# Patient Record
Sex: FEMALE | Race: WHITE | HISPANIC OR LATINO | Employment: OTHER | URBAN - METROPOLITAN AREA
[De-identification: names, ages, dates, MRNs, and addresses within clinical notes are randomized per-mention and may not be internally consistent; named-entity substitution may affect disease eponyms.]

---

## 2017-02-15 ENCOUNTER — HOSPITAL ENCOUNTER (OUTPATIENT)
Dept: RADIOLOGY | Age: 72
Discharge: HOME | End: 2017-02-15
Payer: COMMERCIAL

## 2017-02-15 DIAGNOSIS — M54.50 LOW BACK PAIN: ICD-10-CM

## 2017-02-15 PROCEDURE — 77080 DXA BONE DENSITY AXIAL: CPT

## 2017-02-16 ENCOUNTER — ALLSCRIPTS OFFICE VISIT (OUTPATIENT)
Dept: OTHER | Facility: OTHER | Age: 72
End: 2017-02-16

## 2017-04-14 ENCOUNTER — ALLSCRIPTS OFFICE VISIT (OUTPATIENT)
Dept: OTHER | Facility: OTHER | Age: 72
End: 2017-04-14

## 2017-04-26 ENCOUNTER — ALLSCRIPTS OFFICE VISIT (OUTPATIENT)
Dept: OTHER | Facility: OTHER | Age: 72
End: 2017-04-26

## 2017-05-26 ENCOUNTER — GENERIC CONVERSION - ENCOUNTER (OUTPATIENT)
Dept: OTHER | Facility: OTHER | Age: 72
End: 2017-05-26

## 2017-06-28 ENCOUNTER — ALLSCRIPTS OFFICE VISIT (OUTPATIENT)
Dept: OTHER | Facility: OTHER | Age: 72
End: 2017-06-28

## 2017-07-21 ENCOUNTER — ALLSCRIPTS OFFICE VISIT (OUTPATIENT)
Dept: OTHER | Facility: OTHER | Age: 72
End: 2017-07-21

## 2017-07-27 ENCOUNTER — GENERIC CONVERSION - ENCOUNTER (OUTPATIENT)
Dept: OTHER | Facility: OTHER | Age: 72
End: 2017-07-27

## 2017-08-10 ENCOUNTER — ALLSCRIPTS OFFICE VISIT (OUTPATIENT)
Dept: OTHER | Facility: OTHER | Age: 72
End: 2017-08-10

## 2017-08-10 DIAGNOSIS — E78.00 PURE HYPERCHOLESTEROLEMIA: ICD-10-CM

## 2017-08-10 DIAGNOSIS — R42 DIZZINESS AND GIDDINESS: ICD-10-CM

## 2017-08-15 ENCOUNTER — TRANSCRIBE ORDERS (OUTPATIENT)
Dept: ADMINISTRATIVE | Facility: HOSPITAL | Age: 72
End: 2017-08-15

## 2017-08-15 DIAGNOSIS — I35.9 AORTIC VALVE CALCIFICATION: Primary | ICD-10-CM

## 2017-08-22 ENCOUNTER — HOSPITAL ENCOUNTER (OUTPATIENT)
Dept: NON INVASIVE DIAGNOSTICS | Facility: CLINIC | Age: 72
Discharge: HOME | End: 2017-08-22
Payer: COMMERCIAL

## 2017-08-22 DIAGNOSIS — I35.9 AORTIC VALVE CALCIFICATION: ICD-10-CM

## 2017-08-22 PROCEDURE — 93306 TTE W/DOPPLER COMPLETE: CPT

## 2017-10-11 ENCOUNTER — APPOINTMENT (OUTPATIENT)
Dept: PHYSICAL THERAPY | Facility: CLINIC | Age: 72
End: 2017-10-11
Payer: COMMERCIAL

## 2017-10-11 DIAGNOSIS — R42 DIZZINESS AND GIDDINESS: ICD-10-CM

## 2017-10-11 DIAGNOSIS — E78.00 PURE HYPERCHOLESTEROLEMIA: ICD-10-CM

## 2017-10-11 PROCEDURE — G8979 MOBILITY GOAL STATUS: HCPCS

## 2017-10-11 PROCEDURE — 97162 PT EVAL MOD COMPLEX 30 MIN: CPT

## 2017-10-11 PROCEDURE — G8978 MOBILITY CURRENT STATUS: HCPCS

## 2017-10-12 ENCOUNTER — GENERIC CONVERSION - ENCOUNTER (OUTPATIENT)
Dept: OTHER | Facility: OTHER | Age: 72
End: 2017-10-12

## 2017-10-18 ENCOUNTER — APPOINTMENT (OUTPATIENT)
Dept: PHYSICAL THERAPY | Facility: CLINIC | Age: 72
End: 2017-10-18
Payer: COMMERCIAL

## 2017-10-25 ENCOUNTER — APPOINTMENT (OUTPATIENT)
Dept: PHYSICAL THERAPY | Facility: CLINIC | Age: 72
End: 2017-10-25
Payer: COMMERCIAL

## 2017-10-25 PROCEDURE — 97112 NEUROMUSCULAR REEDUCATION: CPT

## 2018-01-11 NOTE — RESULT NOTES
Verified Results  (1) LIPID PANEL FASTING W DIRECT LDL REFLEX [id number] 07:29AM Guerita Galvez     Test Name Result Flag Reference   CHOLESTEROL 176 mg/dL     LDL CHOLESTEROL CALCULATED 87 mg/dL  0-100   Triglyceride:         Normal              <150 mg/dl       Borderline High    150-199 mg/dl       High               200-499 mg/dl       Very High          >499 mg/dl  Cholesterol:         Desirable        <200 mg/dl      Borderline High  200-239 mg/dl      High             >239 mg/dl  HDL Cholesterol:        High    >59 mg/dL      Low     <41 mg/dL  LDL Cholesterol:        Optimal          <100 mg/dl        Near Optimal     100-129 mg/dl        Above Optimal          Borderline High   130-159 mg/dl          High              160-189 mg/dl          Very High        >189 mg/dl  LDL CALCULATED:    This screening LDL is a calculated result  It does not have the accuracy of the Direct Measured LDL in the monitoring of patients with hyperlipidemia and/or statin therapy  Direct Measure LDL (JFL586) must be ordered separately in these patients  TRIGLYCERIDES 175 mg/dL H <=150   Specimen collection should occur prior to N-Acetylcysteine or Metamizole administration due to the potential for falsely depressed results  HDL,DIRECT 54 mg/dL  40-60   Specimen collection should occur prior to Metamizole administration due to the potential for falsely depressed results

## 2018-01-12 VITALS
SYSTOLIC BLOOD PRESSURE: 130 MMHG | HEIGHT: 62 IN | RESPIRATION RATE: 16 BRPM | HEART RATE: 78 BPM | BODY MASS INDEX: 31.28 KG/M2 | DIASTOLIC BLOOD PRESSURE: 70 MMHG | TEMPERATURE: 98 F | WEIGHT: 170 LBS

## 2018-01-13 VITALS
DIASTOLIC BLOOD PRESSURE: 80 MMHG | BODY MASS INDEX: 31.83 KG/M2 | WEIGHT: 173 LBS | HEIGHT: 62 IN | RESPIRATION RATE: 18 BRPM | TEMPERATURE: 98.3 F | SYSTOLIC BLOOD PRESSURE: 140 MMHG | HEART RATE: 78 BPM

## 2018-01-13 VITALS
SYSTOLIC BLOOD PRESSURE: 122 MMHG | WEIGHT: 169.25 LBS | BODY MASS INDEX: 31.15 KG/M2 | HEIGHT: 62 IN | HEART RATE: 62 BPM | DIASTOLIC BLOOD PRESSURE: 80 MMHG

## 2018-01-14 VITALS
SYSTOLIC BLOOD PRESSURE: 130 MMHG | DIASTOLIC BLOOD PRESSURE: 80 MMHG | HEART RATE: 76 BPM | TEMPERATURE: 97.9 F | BODY MASS INDEX: 31.1 KG/M2 | WEIGHT: 169 LBS | RESPIRATION RATE: 18 BRPM | HEIGHT: 62 IN

## 2018-01-16 NOTE — MISCELLANEOUS
Provider Comments  Provider Comments:   pt no showed today      Signatures   Electronically signed by : Ru Coley, ; May 26 2017  2:16PM EST                       (Author)

## 2018-01-30 DIAGNOSIS — K21.9 GERD (GASTROESOPHAGEAL REFLUX DISEASE): Primary | ICD-10-CM

## 2018-01-30 RX ORDER — OMEPRAZOLE 20 MG/1
CAPSULE, DELAYED RELEASE ORAL
Qty: 90 CAPSULE | Refills: 1 | Status: SHIPPED | OUTPATIENT
Start: 2018-01-30 | End: 2018-02-03 | Stop reason: SDUPTHER

## 2018-02-02 DIAGNOSIS — K21.9 GERD (GASTROESOPHAGEAL REFLUX DISEASE): ICD-10-CM

## 2018-02-02 RX ORDER — OMEPRAZOLE 20 MG/1
CAPSULE, DELAYED RELEASE ORAL
Qty: 90 CAPSULE | Refills: 1 | OUTPATIENT
Start: 2018-02-02

## 2018-02-03 DIAGNOSIS — K21.9 GASTROESOPHAGEAL REFLUX DISEASE WITHOUT ESOPHAGITIS: ICD-10-CM

## 2018-02-03 RX ORDER — OMEPRAZOLE 20 MG/1
20 CAPSULE, DELAYED RELEASE ORAL DAILY
Qty: 90 CAPSULE | Refills: 2 | Status: SHIPPED | OUTPATIENT
Start: 2018-02-03 | End: 2018-11-05 | Stop reason: SDUPTHER

## 2018-03-27 ENCOUNTER — TRANSCRIBE ORDERS (OUTPATIENT)
Dept: INTERNAL MEDICINE CLINIC | Facility: CLINIC | Age: 73
End: 2018-03-27

## 2018-03-27 DIAGNOSIS — R21 RASH AND OTHER NONSPECIFIC SKIN ERUPTION: Primary | ICD-10-CM

## 2018-03-28 ENCOUNTER — OFFICE VISIT (OUTPATIENT)
Dept: MULTI SPECIALTY CLINIC | Facility: CLINIC | Age: 73
End: 2018-03-28
Payer: COMMERCIAL

## 2018-03-28 DIAGNOSIS — R21 RASH AND OTHER NONSPECIFIC SKIN ERUPTION: ICD-10-CM

## 2018-03-28 PROCEDURE — 99212 OFFICE O/P EST SF 10 MIN: CPT | Performed by: SPECIALIST

## 2018-04-17 ENCOUNTER — APPOINTMENT (OUTPATIENT)
Dept: LAB | Facility: HOSPITAL | Age: 73
End: 2018-04-17
Payer: COMMERCIAL

## 2018-04-17 ENCOUNTER — TRANSCRIBE ORDERS (OUTPATIENT)
Dept: LAB | Facility: HOSPITAL | Age: 73
End: 2018-04-17

## 2018-04-17 DIAGNOSIS — F33.3 SEVERE RECURRENT MAJOR DEPRESSION WITH PSYCHOTIC FEATURES (HCC): Primary | ICD-10-CM

## 2018-04-17 DIAGNOSIS — Z79.899 LONG-TERM USE OF HIGH-RISK MEDICATION: ICD-10-CM

## 2018-04-17 DIAGNOSIS — F33.3 SEVERE RECURRENT MAJOR DEPRESSION WITH PSYCHOTIC FEATURES (HCC): ICD-10-CM

## 2018-04-17 LAB
25(OH)D3 SERPL-MCNC: 25.7 NG/ML (ref 30–100)
ALBUMIN SERPL BCP-MCNC: 4.1 G/DL (ref 3.5–5)
ALP SERPL-CCNC: 95 U/L (ref 46–116)
ALT SERPL W P-5'-P-CCNC: 15 U/L (ref 12–78)
ANION GAP SERPL CALCULATED.3IONS-SCNC: 6 MMOL/L (ref 4–13)
AST SERPL W P-5'-P-CCNC: 18 U/L (ref 5–45)
BASOPHILS # BLD AUTO: 0.01 THOUSANDS/ΜL (ref 0–0.1)
BASOPHILS NFR BLD AUTO: 0 % (ref 0–1)
BILIRUB SERPL-MCNC: 0.49 MG/DL (ref 0.2–1)
BUN SERPL-MCNC: 11 MG/DL (ref 5–25)
CALCIUM SERPL-MCNC: 9.3 MG/DL (ref 8.3–10.1)
CHLORIDE SERPL-SCNC: 103 MMOL/L (ref 100–108)
CHOLEST SERPL-MCNC: 157 MG/DL (ref 50–200)
CO2 SERPL-SCNC: 29 MMOL/L (ref 21–32)
CREAT SERPL-MCNC: 0.89 MG/DL (ref 0.6–1.3)
EOSINOPHIL # BLD AUTO: 0.23 THOUSAND/ΜL (ref 0–0.61)
EOSINOPHIL NFR BLD AUTO: 2 % (ref 0–6)
ERYTHROCYTE [DISTWIDTH] IN BLOOD BY AUTOMATED COUNT: 13.6 % (ref 11.6–15.1)
EST. AVERAGE GLUCOSE BLD GHB EST-MCNC: 111 MG/DL
FERRITIN SERPL-MCNC: 161 NG/ML (ref 8–388)
FOLATE SERPL-MCNC: >20 NG/ML (ref 3.1–17.5)
GFR SERPL CREATININE-BSD FRML MDRD: 65 ML/MIN/1.73SQ M
GLUCOSE P FAST SERPL-MCNC: 94 MG/DL (ref 65–99)
HAV IGM SER QL: NORMAL
HBA1C MFR BLD: 5.5 % (ref 4.2–6.3)
HBV CORE IGM SER QL: NORMAL
HBV SURFACE AG SER QL: NORMAL
HCT VFR BLD AUTO: 37.7 % (ref 34.8–46.1)
HCV AB SER QL: NORMAL
HDLC SERPL-MCNC: 55 MG/DL (ref 40–60)
HGB BLD-MCNC: 12.8 G/DL (ref 11.5–15.4)
IRON SATN MFR SERPL: 23 %
IRON SERPL-MCNC: 55 UG/DL (ref 50–170)
LDLC SERPL CALC-MCNC: 82 MG/DL (ref 0–100)
LYMPHOCYTES # BLD AUTO: 3.24 THOUSANDS/ΜL (ref 0.6–4.47)
LYMPHOCYTES NFR BLD AUTO: 28 % (ref 14–44)
MCH RBC QN AUTO: 28.8 PG (ref 26.8–34.3)
MCHC RBC AUTO-ENTMCNC: 34 G/DL (ref 31.4–37.4)
MCV RBC AUTO: 85 FL (ref 82–98)
MONOCYTES # BLD AUTO: 0.72 THOUSAND/ΜL (ref 0.17–1.22)
MONOCYTES NFR BLD AUTO: 6 % (ref 4–12)
NEUTROPHILS # BLD AUTO: 7.45 THOUSANDS/ΜL (ref 1.85–7.62)
NEUTS SEG NFR BLD AUTO: 64 % (ref 43–75)
NONHDLC SERPL-MCNC: 102 MG/DL
NRBC BLD AUTO-RTO: 0 /100 WBCS
PLATELET # BLD AUTO: 359 THOUSANDS/UL (ref 149–390)
PMV BLD AUTO: 10.2 FL (ref 8.9–12.7)
POTASSIUM SERPL-SCNC: 3.5 MMOL/L (ref 3.5–5.3)
PROT SERPL-MCNC: 8.1 G/DL (ref 6.4–8.2)
RBC # BLD AUTO: 4.45 MILLION/UL (ref 3.81–5.12)
RETICS # AUTO: NORMAL 10*3/UL (ref 14097–95744)
RETICS # CALC: 1.13 % (ref 0.37–1.87)
SODIUM SERPL-SCNC: 138 MMOL/L (ref 136–145)
T3 SERPL-MCNC: 1.4 NG/ML (ref 0.6–1.8)
T4 FREE SERPL-MCNC: 1.01 NG/DL (ref 0.76–1.46)
TIBC SERPL-MCNC: 244 UG/DL (ref 250–450)
TRANSFERRIN SERPL-MCNC: 192 MG/DL (ref 200–400)
TRIGL SERPL-MCNC: 102 MG/DL
TSH SERPL DL<=0.05 MIU/L-ACNC: 6.21 UIU/ML (ref 0.36–3.74)
VIT B12 SERPL-MCNC: 964 PG/ML (ref 100–900)
WBC # BLD AUTO: 11.69 THOUSAND/UL (ref 4.31–10.16)

## 2018-04-17 PROCEDURE — 80053 COMPREHEN METABOLIC PANEL: CPT

## 2018-04-17 PROCEDURE — 36415 COLL VENOUS BLD VENIPUNCTURE: CPT

## 2018-04-17 PROCEDURE — 83550 IRON BINDING TEST: CPT

## 2018-04-17 PROCEDURE — 82728 ASSAY OF FERRITIN: CPT

## 2018-04-17 PROCEDURE — 83540 ASSAY OF IRON: CPT

## 2018-04-17 PROCEDURE — 85025 COMPLETE CBC W/AUTO DIFF WBC: CPT

## 2018-04-17 PROCEDURE — 83036 HEMOGLOBIN GLYCOSYLATED A1C: CPT

## 2018-04-17 PROCEDURE — 82607 VITAMIN B-12: CPT

## 2018-04-17 PROCEDURE — 82306 VITAMIN D 25 HYDROXY: CPT

## 2018-04-17 PROCEDURE — 80307 DRUG TEST PRSMV CHEM ANLYZR: CPT

## 2018-04-17 PROCEDURE — 84480 ASSAY TRIIODOTHYRONINE (T3): CPT

## 2018-04-17 PROCEDURE — 84443 ASSAY THYROID STIM HORMONE: CPT

## 2018-04-17 PROCEDURE — 84439 ASSAY OF FREE THYROXINE: CPT

## 2018-04-17 PROCEDURE — 80074 ACUTE HEPATITIS PANEL: CPT

## 2018-04-17 PROCEDURE — 80061 LIPID PANEL: CPT

## 2018-04-17 PROCEDURE — 82746 ASSAY OF FOLIC ACID SERUM: CPT

## 2018-04-17 PROCEDURE — 84466 ASSAY OF TRANSFERRIN: CPT

## 2018-04-17 PROCEDURE — 85045 AUTOMATED RETICULOCYTE COUNT: CPT

## 2018-04-21 LAB
AMPHETAMINES UR QL SCN: NEGATIVE NG/ML
BARBITURATES UR QL SCN: NEGATIVE NG/ML
BENZODIAZ UR QL SCN: NEGATIVE
BZE UR QL: NEGATIVE NG/ML
CANNABINOIDS UR QL SCN: NEGATIVE NG/ML
METHADONE UR QL SCN: NEGATIVE NG/ML
OPIATES UR QL: NEGATIVE NG/ML
PCP UR QL: NEGATIVE NG/ML
PROPOXYPH UR QL: NEGATIVE NG/ML

## 2018-04-24 ENCOUNTER — OFFICE VISIT (OUTPATIENT)
Dept: FAMILY MEDICINE CLINIC | Facility: CLINIC | Age: 73
End: 2018-04-24
Payer: COMMERCIAL

## 2018-04-24 VITALS
BODY MASS INDEX: 30.13 KG/M2 | SYSTOLIC BLOOD PRESSURE: 142 MMHG | DIASTOLIC BLOOD PRESSURE: 80 MMHG | RESPIRATION RATE: 16 BRPM | TEMPERATURE: 97.1 F | HEIGHT: 61 IN | WEIGHT: 159.6 LBS | HEART RATE: 72 BPM

## 2018-04-24 DIAGNOSIS — R42 DIZZINESS: ICD-10-CM

## 2018-04-24 DIAGNOSIS — I10 HYPERTENSION, UNSPECIFIED TYPE: Primary | ICD-10-CM

## 2018-04-24 DIAGNOSIS — E55.9 VITAMIN D DEFICIENCY: ICD-10-CM

## 2018-04-24 DIAGNOSIS — E78.00 HYPERCHOLESTEROLEMIA: ICD-10-CM

## 2018-04-24 DIAGNOSIS — H61.21 IMPACTED CERUMEN OF RIGHT EAR: ICD-10-CM

## 2018-04-24 PROCEDURE — 99213 OFFICE O/P EST LOW 20 MIN: CPT | Performed by: FAMILY MEDICINE

## 2018-04-24 RX ORDER — ALPRAZOLAM 0.25 MG/1
1 TABLET ORAL 2 TIMES DAILY
COMMUNITY
Start: 2011-06-21 | End: 2022-06-29 | Stop reason: SDUPTHER

## 2018-04-24 RX ORDER — DILTIAZEM HYDROCHLORIDE 240 MG/1
1 CAPSULE, EXTENDED RELEASE ORAL DAILY
COMMUNITY
Start: 2011-04-14 | End: 2019-11-21 | Stop reason: SDUPTHER

## 2018-04-24 RX ORDER — ATORVASTATIN CALCIUM 10 MG/1
1 TABLET, FILM COATED ORAL
COMMUNITY
Start: 2013-06-13 | End: 2018-04-24 | Stop reason: SDUPTHER

## 2018-04-24 RX ORDER — DILTIAZEM HYDROCHLORIDE 240 MG/1
240 CAPSULE, COATED, EXTENDED RELEASE ORAL DAILY
Qty: 90 CAPSULE | Refills: 3 | Status: SHIPPED | OUTPATIENT
Start: 2018-04-24 | End: 2018-05-22 | Stop reason: SDUPTHER

## 2018-04-24 RX ORDER — NAPROXEN 500 MG/1
TABLET ORAL
COMMUNITY
Start: 2016-12-09 | End: 2020-01-10

## 2018-04-24 RX ORDER — ERGOCALCIFEROL 1.25 MG/1
50000 CAPSULE ORAL WEEKLY
Qty: 8 CAPSULE | Refills: 0 | Status: SHIPPED | OUTPATIENT
Start: 2018-04-24 | End: 2020-02-04

## 2018-04-24 RX ORDER — FESOTERODINE FUMARATE 8 MG/1
TABLET, FILM COATED, EXTENDED RELEASE ORAL
Refills: 0 | COMMUNITY
Start: 2018-03-16 | End: 2021-06-15

## 2018-04-24 RX ORDER — CLOBETASOL PROPIONATE 0.5 MG/G
1 CREAM TOPICAL 2 TIMES DAILY
Refills: 0 | COMMUNITY
Start: 2018-03-30 | End: 2021-03-16 | Stop reason: SDUPTHER

## 2018-04-24 RX ORDER — ATORVASTATIN CALCIUM 10 MG/1
10 TABLET, FILM COATED ORAL DAILY
Qty: 90 TABLET | Refills: 3 | Status: SHIPPED | OUTPATIENT
Start: 2018-04-24 | End: 2019-08-22 | Stop reason: SDUPTHER

## 2018-04-24 RX ORDER — LISINOPRIL AND HYDROCHLOROTHIAZIDE 20; 12.5 MG/1; MG/1
1 TABLET ORAL DAILY
Qty: 90 TABLET | Refills: 3 | Status: SHIPPED | OUTPATIENT
Start: 2018-04-24 | End: 2018-05-22 | Stop reason: SDUPTHER

## 2018-04-24 RX ORDER — LISINOPRIL AND HYDROCHLOROTHIAZIDE 20; 12.5 MG/1; MG/1
1 TABLET ORAL DAILY
COMMUNITY
Start: 2011-04-14 | End: 2018-04-24 | Stop reason: SDUPTHER

## 2018-04-24 RX ORDER — FLUOXETINE HYDROCHLORIDE 20 MG/1
CAPSULE ORAL
Refills: 0 | COMMUNITY
Start: 2018-04-17

## 2018-04-24 NOTE — PROGRESS NOTES
Maria Eugenia Archer 1945 female MRN: [id number]    Family Medicine Follow-up Visit    ASSESSMENT/PLAN  Maria Eugenia Archer is a 67 y o  female  presents to office for      # 1 Dizziness: intermittent for the last 3 years  She states that her daughter and her started the symptoms together  VT; states that this is likely not vertigo  Patient did have relief from meclizine  Today on exam we removed cerumen, and it improved her tinnitus symptoms  Would recommend ENT referral for evaluation of Mineres and dizzines  Denies hearing loss  Less likely cardiac given that AS is mild and patient asymptomatic  # 2 HLD: Controlled reviewed labs with patient; Cardiology Following  Continue on Atorvastatin 10mg  # 3 Anxiety: Controlled on Alprazolam 0 25mg as needed  Patient obtains script from [facility]  #4 Hypertension; w/ Hx of AS mild:  - Controlled on Lisinopril- HCTZ 20-12 5mg and Cardizem 240mg daily  - Cardiology following  #5 Impacted cerumen right ear: Removed, with no complications  Patient tolerated procedure well  #6 Vit D: Vit D 25 7; Started on Vit D 50,000 units weekly    #7 Abnormal TSH: 6 210; will need to repeat in 1 month for evaluation  Patient was acutely sick at the time of lab draw  HM: Mammo Script given today, Pap Smear: Hysterectomy 1996, PCV 13, Colonoscopy: UTD; continue to need records  Over due for prevnar, patient deferred at this time  Disposition: Return to the clinic in 1 month; TSH repeat      Future Appointments  Date Time Provider Pietro Galvan   4/10/2019 12:45 PM Verena MARTINEZ  [facility]          SUBJECTIVE  CC: Follow-up      HPI:  Maria Eugenia Archer is a 67 y o  female who presents for a 3 month follow up    -dizziness:  Patient states that her dizziness has continued to persist   She has intermittent room spinning sensation, mild headache  Denies any hearing loss    Her daughter and herself state that they had the symptoms of vertigo started the same time  She went to vestibular therapy advised her this was likely not secondary to vertigo  The patient states that she would just like to be diagnosed at this time and be seen by ENT  -has a history of aortic stenosis mild but has not been symptomatic of syncope, chest pain, shortness of breath at this time  -vitamin-D deficiency; patient does not take supplements at this time  -hyperlipidemia patient currently taking atorvastatin without any difficulty  -hypertension patient has been taking her medications with no concerns  She would like a refill on both medications at this time    Review of Systems   Constitutional: Negative for activity change and appetite change  HENT: Negative for congestion, hearing loss and sore throat  Respiratory: Negative for cough, chest tightness and shortness of breath  Cardiovascular: Negative for chest pain  Gastrointestinal: Negative for abdominal distention and abdominal pain  Musculoskeletal: Negative for arthralgias and back pain  Skin: Negative for rash  Neurological: Positive for dizziness and headaches  Negative for light-headedness  All other systems reviewed and are negative        Historical Information   The patient history was reviewed as follows:    Past Medical History:   Diagnosis Date    Arthritis     Chest pain     Edema of lower extremity     Esophageal reflux     Hx of cardiac cath     Hypertension      Past Surgical History:   Procedure Laterality Date    BLADDER SURGERY       Family History   Problem Relation Age of Onset    COPD Mother     Emphysema Mother       Social History   History   Alcohol Use No     History   Drug use: Unknown     History   Smoking Status    Never Smoker   Smokeless Tobacco    Not on file       Medications:     Current Outpatient Prescriptions:     ALPRAZolam (XANAX) 0 25 mg tablet, Take 1 tablet by mouth 3 (three) times a day, Disp: , Rfl:     atorvastatin (LIPITOR) 10 mg tablet, Take 1 tablet by mouth, Disp: , Rfl:     diltiazem (TIAZAC) 240 MG 24 hr capsule, Take 1 capsule by mouth daily, Disp: , Rfl:     lisinopril-hydrochlorothiazide (PRINZIDE,ZESTORETIC) 20-12 5 MG per tablet, Take 1 tablet by mouth daily, Disp: , Rfl:     naproxen (NAPROSYN) 500 mg tablet, take 1 tablet by mouth every 12 hours with food if needed, Disp: , Rfl:     omeprazole (PriLOSEC) 20 mg delayed release capsule, Take 1 capsule (20 mg total) by mouth daily, Disp: 90 capsule, Rfl: 2    clobetasol (TEMOVATE) 0 05 % cream, Apply 1 application topically 2 (two) times a day, Disp: , Rfl: 0    FLUoxetine (PROzac) 20 mg capsule, , Disp: , Rfl: 0    hydrocortisone 2 5 % cream, Apply 1 application topically 2 (two) times a day, Disp: , Rfl: 0    TOVIAZ 8 MG TB24, , Disp: , Rfl: 0  No Known Allergies    OBJECTIVE    Vitals:   Vitals:    04/24/18 1440   BP: 142/80   Pulse: 72   Resp: 16   Temp: (!) 97 1 °F (36 2 °C)   Weight: 72 4 kg (159 lb 9 6 oz)   Height: 5' 1 4" (1 56 m)           Physical Exam   Constitutional: She is oriented to person, place, and time  She appears well-developed and well-nourished  HENT:   Head: Normocephalic and atraumatic  Left Ear: External ear normal    Right ear: Cermen impaction  Eyes: Conjunctivae and EOM are normal  Pupils are equal, round, and reactive to light  Neck: Normal range of motion  Neck supple  Cardiovascular: Normal rate, regular rhythm, normal heart sounds and intact distal pulses  No murmur heard  Pulmonary/Chest: Effort normal and breath sounds normal  No respiratory distress  She has no wheezes  Abdominal: Soft  Bowel sounds are normal  She exhibits no distension  There is no tenderness  Musculoskeletal: Normal range of motion  She exhibits no edema  Neurological: She is alert and oriented to person, place, and time  Skin: Skin is warm and dry  No rash noted  Psychiatric: She has a normal mood and affect   Her behavior is normal  Judgment and thought content normal    Vitals reviewed           Li Harris MD, PGY-3  [facility]   4/24/2018

## 2018-05-22 ENCOUNTER — OFFICE VISIT (OUTPATIENT)
Dept: FAMILY MEDICINE CLINIC | Facility: CLINIC | Age: 73
End: 2018-05-22
Payer: COMMERCIAL

## 2018-05-22 VITALS
TEMPERATURE: 97 F | BODY MASS INDEX: 29.44 KG/M2 | RESPIRATION RATE: 16 BRPM | SYSTOLIC BLOOD PRESSURE: 130 MMHG | DIASTOLIC BLOOD PRESSURE: 80 MMHG | WEIGHT: 160 LBS | HEART RATE: 76 BPM | HEIGHT: 62 IN

## 2018-05-22 DIAGNOSIS — R42 DIZZINESS: Primary | ICD-10-CM

## 2018-05-22 DIAGNOSIS — I10 HYPERTENSION, UNSPECIFIED TYPE: ICD-10-CM

## 2018-05-22 DIAGNOSIS — R79.89 ABNORMAL TSH: ICD-10-CM

## 2018-05-22 PROCEDURE — 3075F SYST BP GE 130 - 139MM HG: CPT | Performed by: FAMILY MEDICINE

## 2018-05-22 PROCEDURE — 3079F DIAST BP 80-89 MM HG: CPT | Performed by: FAMILY MEDICINE

## 2018-05-22 PROCEDURE — 99213 OFFICE O/P EST LOW 20 MIN: CPT | Performed by: FAMILY MEDICINE

## 2018-05-22 RX ORDER — DILTIAZEM HYDROCHLORIDE 240 MG/1
240 CAPSULE, COATED, EXTENDED RELEASE ORAL DAILY
Qty: 90 CAPSULE | Refills: 6 | Status: SHIPPED | OUTPATIENT
Start: 2018-05-22 | End: 2019-05-31 | Stop reason: SDUPTHER

## 2018-05-22 RX ORDER — LISINOPRIL AND HYDROCHLOROTHIAZIDE 20; 12.5 MG/1; MG/1
1 TABLET ORAL DAILY
Qty: 90 TABLET | Refills: 6 | Status: SHIPPED | OUTPATIENT
Start: 2018-05-22 | End: 2019-05-22 | Stop reason: SDUPTHER

## 2018-05-22 NOTE — PROGRESS NOTES
bAby Henry 1945 female MRN: [id number]    Family Medicine Follow-up Visit    ASSESSMENT/PLAN  Abby Henry is a 67 y o  female presents to the office for     1)Hypertension:  Controlled on medications  Refilled medications per patient's request today  2) Abnormal TSH:  Repeat TSH level script given to be performed in July    3) Dizziness:  Intermittent for the last 3 years  VT theapist; states that this is likely not vertigo  Patient did have relief from meclizine  Would recommend ENT referral for evaluation of Mineres and dizzines  Daughter will make appoinment today  - Denies hearing loss  Would like to obtain MRI but patient at this time would like an opinion from a specialist given that she is claustrophobia  I did recommend that we could possibly sedate her for the MRI if ENT would like evaluation    Disposition: Return to the clinic in 3 months        Future Appointments  Date Time Provider Pietro Galvan   4/10/2019 12:45 PM Sisi MARTINEZ [facility]          SUBJECTIVE  CC: Follow-up      HPI:  Abby Henry is a 67 y o  female presents to the office for follow-up on dizziness  The patient states that her symptoms continue to be present  Her dizziness is aggravated with moving  positions and quick movements  The patient has not had any time to see the ENT specialist   The patient states that vestibular therapy told her that this was not signs of vertigo and should be evaluated more extensively  The patient refuses to get any imaging given to her claustrophobia  Abnormal TSH in the past patient would like to repeat to be sure that she does not hypothyroid  Hypertension is stable on her medication she would like a refill on them today  Review of Systems   Constitutional: Negative for activity change and appetite change  HENT: Negative for congestion, hearing loss and sore throat      Respiratory: Negative for cough, chest tightness and shortness of breath  Cardiovascular: Negative for chest pain  Gastrointestinal: Negative for abdominal distention and abdominal pain  Musculoskeletal: Negative for arthralgias and back pain  Skin: Negative for rash  Neurological: Positive for dizziness and headaches  Negative for light-headedness  All other systems reviewed and are negative        Historical Information   The patient history was reviewed as follows:    Past Medical History:   Diagnosis Date    Arthritis     Chest pain     Edema of lower extremity     Esophageal reflux     Hx of cardiac cath     Hypertension      Past Surgical History:   Procedure Laterality Date    BLADDER SURGERY       Family History   Problem Relation Age of Onset    COPD Mother     Emphysema Mother       Social History   History   Alcohol Use No     History   Drug use: Unknown     History   Smoking Status    Never Smoker   Smokeless Tobacco    Not on file       Medications:     Current Outpatient Prescriptions:     ALPRAZolam (XANAX) 0 25 mg tablet, Take 1 tablet by mouth 3 (three) times a day, Disp: , Rfl:     atorvastatin (LIPITOR) 10 mg tablet, Take 1 tablet (10 mg total) by mouth daily, Disp: 90 tablet, Rfl: 3    clobetasol (TEMOVATE) 0 05 % cream, Apply 1 application topically 2 (two) times a day, Disp: , Rfl: 0    diltiazem (CARDIZEM CD) 240 mg 24 hr capsule, Take 1 capsule (240 mg total) by mouth daily, Disp: 90 capsule, Rfl: 3    diltiazem (TIAZAC) 240 MG 24 hr capsule, Take 1 capsule by mouth daily, Disp: , Rfl:     ergocalciferol (VITAMIN D2) 50,000 units, Take 1 capsule (50,000 Units total) by mouth once a week for 12 days, Disp: 8 capsule, Rfl: 0    FLUoxetine (PROzac) 20 mg capsule, , Disp: , Rfl: 0    hydrocortisone 2 5 % cream, Apply 1 application topically 2 (two) times a day, Disp: , Rfl: 0    lisinopril-hydrochlorothiazide (PRINZIDE,ZESTORETIC) 20-12 5 MG per tablet, Take 1 tablet by mouth daily, Disp: 90 tablet, Rfl: 3   naproxen (NAPROSYN) 500 mg tablet, take 1 tablet by mouth every 12 hours with food if needed, Disp: , Rfl:     omeprazole (PriLOSEC) 20 mg delayed release capsule, Take 1 capsule (20 mg total) by mouth daily, Disp: 90 capsule, Rfl: 2    TOVIAZ 8 MG TB24, , Disp: , Rfl: 0  No Known Allergies    OBJECTIVE    Vitals:   Vitals:    05/22/18 1408   BP: 130/80   Pulse: 76   Resp: 16   Temp: (!) 97 °F (36 1 °C)   Weight: 72 6 kg (160 lb)   Height: 5' 2" (1 575 m)           Physical Exam   Constitutional: She is oriented to person, place, and time  She appears well-developed and well-nourished  HENT:   Head: Normocephalic and atraumatic  Left Ear: External ear normal    Right ear: Cermen impaction  Eyes: Conjunctivae and EOM are normal  Pupils are equal, round, and reactive to light  Neck: Normal range of motion  Neck supple  Cardiovascular: Normal rate, regular rhythm, normal heart sounds and intact distal pulses  No murmur heard  Pulmonary/Chest: Effort normal and breath sounds normal  No respiratory distress  She has no wheezes  Abdominal: Soft  Bowel sounds are normal  She exhibits no distension  There is no tenderness  Musculoskeletal: Normal range of motion  She exhibits no edema  Neurological: She is alert and oriented to person, place, and time  Diya Marroquin pike wasn't performed today  Skin: Skin is warm and dry  No rash noted  Psychiatric: She has a normal mood and affect  Her behavior is normal  Judgment and thought content normal    Vitals reviewed           Jamshid Corado MD, PGY-3  [place] [id number]

## 2018-09-20 ENCOUNTER — OFFICE VISIT (OUTPATIENT)
Dept: FAMILY MEDICINE CLINIC | Facility: CLINIC | Age: 73
End: 2018-09-20
Payer: COMMERCIAL

## 2018-09-20 ENCOUNTER — TELEPHONE (OUTPATIENT)
Dept: FAMILY MEDICINE CLINIC | Facility: CLINIC | Age: 73
End: 2018-09-20

## 2018-09-20 VITALS
TEMPERATURE: 95 F | HEART RATE: 82 BPM | DIASTOLIC BLOOD PRESSURE: 80 MMHG | SYSTOLIC BLOOD PRESSURE: 140 MMHG | HEIGHT: 62 IN | BODY MASS INDEX: 28.34 KG/M2 | WEIGHT: 154 LBS | RESPIRATION RATE: 16 BRPM

## 2018-09-20 DIAGNOSIS — R30.0 DYSURIA: Primary | ICD-10-CM

## 2018-09-20 DIAGNOSIS — M62.830 SPASM OF LUMBAR PARASPINOUS MUSCLE: ICD-10-CM

## 2018-09-20 DIAGNOSIS — R35.0 URINARY FREQUENCY: ICD-10-CM

## 2018-09-20 DIAGNOSIS — R10.819 SUPRAPUBIC TENDERNESS: ICD-10-CM

## 2018-09-20 DIAGNOSIS — R82.90 ABNORMAL URINALYSIS: ICD-10-CM

## 2018-09-20 LAB
BACTERIA UR QL AUTO: ABNORMAL /HPF
BILIRUB UR QL STRIP: NEGATIVE
CLARITY UR: CLEAR
COLOR UR: YELLOW
GLUCOSE UR STRIP-MCNC: NEGATIVE MG/DL
HGB UR QL STRIP.AUTO: ABNORMAL
HYALINE CASTS #/AREA URNS LPF: ABNORMAL /LPF
KETONES UR STRIP-MCNC: NEGATIVE MG/DL
LEUKOCYTE ESTERASE UR QL STRIP: ABNORMAL
NITRITE UR QL STRIP: NEGATIVE
NON-SQ EPI CELLS URNS QL MICRO: ABNORMAL /HPF
PH UR STRIP.AUTO: 5 [PH] (ref 4.5–8)
PROT UR STRIP-MCNC: NEGATIVE MG/DL
RBC #/AREA URNS AUTO: ABNORMAL /HPF
SL AMB  POCT GLUCOSE, UA: NEGATIVE
SL AMB LEUKOCYTE ESTERASE,UA: ABNORMAL
SL AMB POCT BILIRUBIN,UA: NEGATIVE
SL AMB POCT BLOOD,UA: ABNORMAL
SL AMB POCT CLARITY,UA: ABNORMAL
SL AMB POCT COLOR,UA: YELLOW
SL AMB POCT KETONES,UA: NEGATIVE
SL AMB POCT NITRITE,UA: NEGATIVE
SL AMB POCT PH,UA: 6
SL AMB POCT SPECIFIC GRAVITY,UA: 1.01
SL AMB POCT URINE PROTEIN: NEGATIVE
SL AMB POCT UROBILINOGEN: 0.2
SP GR UR STRIP.AUTO: 1.01 (ref 1–1.03)
UROBILINOGEN UR QL STRIP.AUTO: 0.2 E.U./DL
WBC #/AREA URNS AUTO: ABNORMAL /HPF

## 2018-09-20 PROCEDURE — 3008F BODY MASS INDEX DOCD: CPT | Performed by: FAMILY MEDICINE

## 2018-09-20 PROCEDURE — 81003 URINALYSIS AUTO W/O SCOPE: CPT | Performed by: FAMILY MEDICINE

## 2018-09-20 PROCEDURE — 87086 URINE CULTURE/COLONY COUNT: CPT | Performed by: FAMILY MEDICINE

## 2018-09-20 PROCEDURE — 99213 OFFICE O/P EST LOW 20 MIN: CPT | Performed by: FAMILY MEDICINE

## 2018-09-20 PROCEDURE — 1160F RVW MEDS BY RX/DR IN RCRD: CPT | Performed by: FAMILY MEDICINE

## 2018-09-20 PROCEDURE — 81001 URINALYSIS AUTO W/SCOPE: CPT | Performed by: FAMILY MEDICINE

## 2018-09-20 RX ORDER — METHOCARBAMOL 750 MG/1
750 TABLET, FILM COATED ORAL EVERY 8 HOURS SCHEDULED
Qty: 30 TABLET | Refills: 0 | Status: SHIPPED | OUTPATIENT
Start: 2018-09-20 | End: 2019-05-31

## 2018-09-20 RX ORDER — PHENAZOPYRIDINE HYDROCHLORIDE 100 MG/1
100 TABLET, FILM COATED ORAL 3 TIMES DAILY PRN
Qty: 10 TABLET | Refills: 0 | Status: SHIPPED | OUTPATIENT
Start: 2018-09-20 | End: 2018-09-22

## 2018-09-20 RX ORDER — NITROFURANTOIN 25; 75 MG/1; MG/1
100 CAPSULE ORAL 2 TIMES DAILY
Qty: 10 CAPSULE | Refills: 0 | Status: SHIPPED | OUTPATIENT
Start: 2018-09-20 | End: 2018-09-25

## 2018-09-20 NOTE — PATIENT INSTRUCTIONS
Disuria   CUIDADO AMBULATORIO:   Disuria  es la dificultad para orinar, o el dolor, ardor o molestia al [place]  La disuria por lo general es un síntoma de algún otro problema, lolly un bloqueo o charisma infección del tracto urinario  Los síntomas más comunes incluyen los siguientes:   · [place] o escalofríos     · Gerarda Goldmann y de mal olor     · Ganas de orinar con frecuencia, suad orinar muy poco     · Dolor en la espalda, en el costado o en el abdomen     · [place] en la orina     · Secreción maloliente     · Comezón  Busque atención médica de inmediato si:   · Usted tiene dolor intenso en la espalda, en un costado o en el abdomen  · Usted tiene fiebre y escalofríos con temblor  · Usted vomita varias veces seguidas  Pregúntele a rossi Karyn Lin vitaminas y minerales son adecuados para usted  · Gudelia síntomas no desaparecen, aún después del tratamiento  · Usted tiene preguntas o inquietudes acerca de rossi condición o cuidado  El tratamiento para la disuria  podría incluir medicamentos para tratar charisma infección bacteriana o para ayudar a disminuir los espasmos de la vejiga  Controle rossi disuria:   · Ingiera más líquidos  Los líquidos ayudan a desechar las bacterias que estén provocando charisma infección  Pregunte a rossi médico sobre la cantidad de líquido que necesita trent todos los días y cuáles le recomienda  · Forada emre de asiento lolly se le indique  Llene charisma steve de baño con 4 a 6 pulgadas de [place]  [place] usar un recipiente para baño de asiento que quepa en el inodoro  Siéntese en el baño de steve por 20 minutos  Pj esto 2 a 3 veces a la semana según le indicaron  El agua cálida va a ayudara reducir el dolor y la inflamación  Acuda a gudelia consultas de control con rossi médico según le indicaron  Anote gudelia preguntas para que se acuerde de hacerlas theresa gudelia visitas     © 2017 [address] Michael Villafana Information is for End User's use only and may not be sold, redistributed or otherwise used for commercial purposes  All illustrations and images included in CareNotes® are the copyrighted property of A PERRI A M , Inc  or Geoff Christopher  Esta información es sólo para uso en educación  Rossi intención no es darle un consejo médico sobre enfermedades o tratamientos  Colsulte con rossi Bary Shazia farmacéutico antes de seguir cualquier régimen médico para saber si es seguro y efectivo para usted  Nitrofurantoina Combinación (Por la boca)   Se [place] para tratar las infecciones que son causadas por bacterias en el tracto urinario  Danica medicamento es un antibiótico   Bladimir(s) : Macrobid   Existen muchas otras marcas de danica medicamento  Danica medicamento no debe ser usado cuando:   No use danica medicamento si alguna vez ha tenido charisma reacción alérgica a la nitrofurantoína o si usted está en las últimas semanas de embarazo (en la semana 38 o más tarde)  No use danica medicamento si usted tiene enfermedad severa en los riñones, si no puede orinar o tiene menos cantidad [facility]  No use danica medicamento si usted tiene un historial de enfermedades en el hígado con nitrofurantoína  Forma de usar danica medicamento:   [place]  · Rossi médico le indicara cuanto medicamento necesita usar  No use más medicamento de lo indicado  · Lo más conveniente es trent danica medicamento con comida o con [place]  · [place] todo rossi medicamento recetado para eliminar rossi infección por completo aunque usted se sienta mejor después de las primeras dosis  Si charisma dosis es [place]:   · Si olvida charisma dosis de rossi medicamento, tómelo lo más pronto posible  Si es marjorie la hora para rossi próxima dosis, espere hasta entonces para trent rossi dosis regular  No use medicamento adicional para reponer la dosis olvidada  Forma de guardar y botar danica medicamento:   · Guarde el medicamento en un recipiente cerrado a temperatura ambiente y alejado del calor, la humedad y la chon directa    · [address] [facility] vencimiento [facility] y las medicinas que ya no necesita siguiendo las instrucciones del farmacéutico, médico o paramédico   · Guarde todos los medicamentos fuera del alcance de los niños  Nunca comparta gudelia medicamentos con [employer]  Medicamentos y [employer] que debe evitar:   Consulte con rossi médico o farmacéutico antes de usar cualquier medicamento, incluyendo los que compra sin receta médica, las vitaminas y los productos herbales  · No olvide informarle a rossi médico si también está usando probenecida (Benemid®) o sulfinpirazona (Anturane®)  · Es mejor no usar antiácidos que contienen trisilicato de magnesio (lolly Foamicon® o GETA) , mientras esté usando la nitrofurantoína  Precauciones theresa el uso de satish medicamento:   · Asegúrese de que rossi médico sepa si usted está embarazada o amamantando, o si tiene enfermedad hepática, enfermedad cardíaca, enfermedad pulmonar, anemia, diabetes, un desequilibrio de minerales en la rosa o deficiencia de vitamina B  Asegúrese de que rossi médico sabe si usted tiene charisma condición llamada deficiencia de G6PD  · [employer] puede hacer que rossi orina sea de color [place]  [place] es normal y no afecta la forma lolly actúa satish medicamento  · Satish medicamento puede causar diarrea  Llame a rossi médico si la diarrea se intensifica, no se detiene, o contiene rosa  No tome ningún medicamento para suspender la diarrea hasta que hable con rossi médico  La diarrea puede ocurrir 2 meces o más después de suspender el uso de [employer]  · Use satish medicamento para tratar únicamente la infección que usted tiene actualmente  Satish medicamento no va a funcionar para resfriados, influenza, u otros virus    Efectos secundarios que pueden presentarse theresa el uso de satish medicamento:   Consulte inmediatamente con el médico si nota cualquiera de estos efectos secundarios:  · Reacción alérgica: Comezón o ronchas, hinchazón del angel o las vickie, hinchazón u hormigueo en la boca o garganta, opresión en el pecho, dificultad para respirar  · Ampollas, despelleje, o salpullido meyers en la piel  · Zachary Art, escalofríos, debilidad, falta de aliento, [place] [employer]  · Orina oscura o heces pálidas  · Diarrea o evacuaciones intestinales blandas o acuosas y con posible contenido de [place]  · Náuseas, vómitos, pérdida del apetito o dolor en la parte superior del estómago  · Adormecimiento, hormigueo o ardor en gudelia vickie, brazos, piernas o pies  · Coloración amarillenta de la piel o la parte nilton de los ojos  Consulte con el médico si nota los siguientes efectos secundarios menos graves:   · [place], dolor de [place] o visión borrosa  · Pérdida del harsh  · Náuseas moderadas, vómito, estreñimiento, malestar o dolor estomacal   · Picazón o flujo vaginal   Consulte con el médico si nota otros efectos secundarios que antonia son causados por satish medicamento  Llame a holt médico para consultarle Whit Jonas puede notificar gudelia efectos secundarios al FDA al [id number]  © 2017 [address] Michael Villafana Information is for End User's use only and may not be sold, redistributed or otherwise used for commercial purposes  Esta información es sólo para uso en educación  Holt intención no es darle un consejo médico sobre enfermedades o tratamientos  Colsulte con holt William Arms farmacéutico antes de seguir cualquier régimen médico para saber si es seguro y efectivo para usted  Fenazopiridina (Por la boca)   Za los síntomas causados por infecciones del tracto urinario, y otros problemas urinarios  Bladimir(s) : Azo Standard, Azo Urinary Pain Relief, Azo Urinary Tract Health, Baridium, Leader Urinary Pain Relief, Preferred Urinary Pain Relief, Pyridium, Quality Choice Azo, [employer] Aid Urinary Pain Relief, Urinary Pain Relief, Woman's Wellbeing UTI Relief   Existen muchas otras marcas de [place]    Satish medicamento no debe ser usado cuando:   No use esta medicina si alguna vez ha tenido charisma reacción alérgica a fenazopiridina  Forma de usar satish medicamento:   Tableta  · El médico le dirá cuánto medicamento trent, y la frecuencia con que debe hacerlo  · Trague las tabletas enteras, no las aplaste o [place]  · Puede trent el medicamento con comida para evitar el malestar estomacal   Si charisma dosis es olvidada:   · [place] la dosis [place] lo más pronto posible  · No tome la dosis olvidada si es hora de rossi próxima dosis regular  · No use dos dosis al [employer]  Forma de guardar y botar satish medicamento:   · Guarde el medicamento a temperatura ambiente, alejado de la humedad y la chon directa  · [address] de vencimiento haya expirado y las medicinas que ya no necesita siguiendo las instrucciones del farmacéutico, médico o paramédico   · Guarde todos los medicamentos fuera del alcance de los niños  Medicamentos y [employer] que debe evitar:      Consulte con rossi médico o farmacéutico antes de usar cualquier medicamento, incluyendo los que compra sin receta médica, las vitaminas y los productos herbales  Precauciones theresa el uso de satish medicamento:   · Informe al doctor antes de trent fenazopiridina si sufre del [place] o si está embarazada o dando de lactar  · Esta medicina puede volver rossi orina de color meyers o naranja  [place] es normal   · Esta medicina podría manchar los lentes de contacto blandos  Es posible que prefiera no usar gudelia lentes de contacto mientras lyle esta medicina    Efectos secundarios que pueden presentarse theresa el uso de satish medicamento:   Consulte inmediatamente con el médico si nota cualquiera de estos efectos secundarios:  · Urticaria, ronchas, problema respirando  · Piel u ojos amarillentos  Consulte con el médico si nota los siguientes efectos secundarios menos graves:   · Indigestión o malestar estomacal  · [employer]  · Dolor de ronaldo  Consulte con el médico si nota otros efectos secundarios que antonia son causados por satish medicamento  Llame a holt médico para consultarle Whit Starkeyted puede notificar gudelia efectos secundarios al FDA al [phone]  © 2017 [facility] MIRAGE Information is for End User's use only and may not be sold, redistributed or otherwise used for commercial purposes  Esta información es sólo para uso en educación  Holt intención no es darle un consejo médico sobre enfermedades o tratamientos  Colsulte con holt Benuel Chapa farmacéutico antes de seguir cualquier régimen médico para saber si es seguro y efectivo para usted  Espasmo muscular   LO QUE NECESITA SABER:   Un espasmo muscular es charisma contracción convulsiva involuntaria de cualquier músculo o de un david de músculos  Un calambre muscular es un espasmo muscular muy doloroso  Los calambres musculares generalmente ocurren después del ejercicio intenso o theresa el [facility]  Estos también pueden ser provocados por ciertos medicamentos, la deshidratación, bajos niveles de calcio o magnesio u otras condiciones de [place] [facility]:   Medicamentos:  Usted podría  necesitar lo siguiente:  · AINEs (Analgésicos antiinflamatorios no esteroides)  pueden disminuir la inflamación y el dolor o la fiebre  Satish medicamento esta disponible con o sin charisma receta médica  Los AINEs pueden causar sangrado estomacal o problemas renales en ciertas personas  Si usted lyle un medicamento anticoagulante, siempre pregúntele a holt médico si los ARPITA son seguros para usted  Siempre robel la etiqueta de satish medicamento y [place] instrucciones  · [place] gudelia medicamentos lolly se le haya indicado  Consulte con holt médico si usted antonia que holt medicamento no le está ayudando o si presenta efectos secundarios  Infórmele si es alérgico a algún medicamento  Mantenga charisma lista actualizada de los [place], las vitaminas y los productos herbales que lyle   Incluya los siguientes datos de los medicamentos: cantidad, frecuencia y motivo de administración  Traiga con usted la lista o los envases de la píldoras a gudelia citas de seguimiento  Lleve la lista de los medicamentos con usted en manjula de charisma emergencia  Acuda a gudelia consultas de control con rossi médico según le indicaron  Usted puede necesitar otros exámenes o tratamientos  También es posible que lo refieran a un fisioterapeuta u otro especialista  Anote gudelia preguntas para que se acuerde de hacerlas theresa gudelia visitas  Cuidados personales:   · El estiramiento  de rossi músculo ayuda a aliviar el calambre  También puede ser de [place] el músculo estirado hasta que el calambre desaparezca  · Aplique calor  para ayudar a disminuir el dolor y el espasmo muscular  Aplíquese calor en el área lesionada theresa 20 a 30 minutos cada 2 horas theresa la cantidad de [employer] indiquen  · Aplique hielo  para ayudar a disminuir la inflamación y el dolor  El hielo también puede contribuir a evitar el daño de los tejidos  Use un paquete de hielo o ponga hielo molido dentro de [employer]  Envuelva la compresa o bolsa con charisma toalla y colóquela sobre rossi músculo por 15 a 20 minutos cada hora o lolly se lo indicaron  · Consuma más líquidos  para ayudar a prevenir espasmos musculares causados por la deshidratación  Las bebidas atléticas pueden ayudar a reemplazar los electrolitos que pierde theresa el ejercicio por la sudoración  Pregunte a rossi médico sobre la cantidad de líquido que necesita trent todos los días y cuáles le recomienda  · Consuma alimentos saludables , lolly frutas, verduras, granos integrales, productos lácteos bajos en grasa y proteínas bajas en grasa (carne [place], legumbres y pescado)  Si está embarazada, pregúntele a rosis médico qué alimentos son ricos en magnesio y [place]  Estos pueden ayudar para [employer] calambres theresa el [facility]  · Dé masajes suaves sobre el músculo  para aliviar el calambre       · Respire profundo varias veces  hasta que el calambre se mejore  Acuéstese mientras respira profundo para que no sufra un mareo o desvanecimiento  Pregúntele a holt Bokeelia Savers vitaminas y minerales son adecuados para usted  · Usted presenta signos de deshidratación, lolly dolor de [place], [place] de color amarillo oscuro, ojos o boca secos o latidos cardíacos rápidos  · Usted tiene preguntas o inquietudes acerca de holt condición o cuidado  Regrese a la jayden de emergencias si:   · El músculo con el calambre está caliente al tacto, inflamado o enrojecido  · Usted sufre con frecuencia y sin alivio de calambres musculares en varios músculos diferentes  · Usted presenta calambres musculares con entumecimiento, cosquilleo y sensación quemante en gudelia vickie y pies  © 2017 [facility] Information is for End User's use only and may not be sold, redistributed or otherwise used for commercial purposes  All illustrations and images included in CareNotes® are the copyrighted property of A D A M , Inc  or Geoff Christopher  Esta información es sólo para uso en educación  Holt intención no es darle un consejo médico sobre enfermedades o tratamientos  Colsulte con holt Dharmesh Dallas farmacéutico antes de seguir cualquier régimen médico para saber si es seguro y efectivo para usted  Metocarbamol (Por la boca)   Sirve para tratar el dolor y los espasmos musculares  Bladimir(s) : Robaxin, Robaxin-750   Existen muchas otras marcas de [place]  Danica medicamento no debe ser usado cuando:   No use danica medicamento si alguna vez spann tenido [place] reacción alérgica al [employer] o a medicamentos relacionados lolly Norgesic® o [place]  Forma de usar danica medicamento:   Tableta  · El médico le dirá cuánto medicamento trent, y la frecuencia con que debe hacerlo    · No tome charisma dosis mayor ni con más frecuencia que lo ordenado por holt médico   Si charisma dosis es olvidada:   · [place] la dosis [place] lo más pronto posible, si no ha pasado más de charisma hora del horario habitual  Si spann pasado más de [place], sáltese la dosis [place] y regrese a rossi horario regular  · No use dos dosis al [employer]  Forma de guardar y botar satish medicamento:   · Guarde a temperatura ambiente, lejos del calor, la humedad y la chon directa  · Guarde todos los medicamentos fuera del alcance de los niños  Medicamentos y [employer] que debe evitar:   Consulte con rossi médico o farmacéutico antes de usar cualquier medicamento, incluyendo los que compra sin receta médica, las vitaminas y los productos herbales  · No deshaun alcohol mientras use satish medicamento  · Informe al médico si usted está usando cualquier medicamento que pueda causarle sueño lolly las píldoras para dormir, sedantes, antidepresivos, medicamentos para el resfriado, alergia o dolor  Precauciones theresa el uso de satish medicamento:   · Informe a rossi medico si esta embarazada o dando de lactar  · [place] puede causar sueño o mareo  Tenga cuidado si maneja un automóvil o al [employer]  · Dueñas puede produce que rossi orina se torne [place], jeremi o mansi  [place] no es razón para preocuparse  Efectos secundarios que pueden presentarse theresa el uso de satish medicamento:   Consulte inmediatamente con el médico si nota cualquiera de estos efectos secundarios:  · Dificultad para respirar  · Sarpullido a la piel, ronchas o picazón  · Dificultad para hablar con claridad o sueño severo  · Fiebre o escalofríos  Consulte con el médico si nota los siguientes efectos secundarios menos graves:   · Dolor de ronaldo  · Sueño o Genene Poll  · Náuseas  · Pérdida del apetito o sabor metálico  · Nariz congestionada  Consulte con el médico si nota otros efectos secundarios que antonia son causados por satish medicamento  Llame a rossi médico para consultarle Whit Jonas puede notificar gudelia efectos secundarios al FDA al [phone]    © 2017 26[address] Michael Villafana Information is for End User's use only and may not be sold, redistributed or otherwise used for commercial purposes  Esta información es sólo para uso en educación  Rossi intención no es darle un consejo médico sobre enfermedades o tratamientos  Colsulte con rossi Melody Babita farmacéutico antes de seguir cualquier régimen médico para saber si es seguro y efectivo para usted

## 2018-09-20 NOTE — TELEPHONE ENCOUNTER
Patient was seen earlier in the office today and was prescribed nitrofurantoin for suspicion of UTI, got a call from patient's daughter that nitrofurantoin is not covered by insurance and would require prior authorization, switched antibiotic to Bactrim double-strength b i d  for 3 days, urine culture pending, antibiotic to be adjusted as per urine culture reports

## 2018-09-20 NOTE — PROGRESS NOTES
Wendy Ortiz 1945 female MRN: [id number]    Family Medicine Follow-up Visit    ASSESSMENT/PLAN  Diagnoses and all orders for this visit:    Dysuria, Suprapubic tenderness, Urinary frequency, Abnormal urinalysis  -     POCT urine dip auto non-scope: Nitrite negative but moderate leukocytes  -     Unable to assess hematuria due to hemolyzed sample (although it should be noted patient has documented hx of microscopic  hematuria)  -     UA (URINE) with reflex to Microscopic  -     Urine culture  -     Start empiric UTI tx via nitrofurantoin (MACROBID) 100 mg capsule; Take 1 capsule (100 mg total) by mouth 2 (two) times a day for 5 days   -     Will tailor abx as necessary pending urine culture results  -     Start phenazopyridine (PYRIDIUM) 100 mg tablet; Take 1 tablet (100 mg total) by mouth 3 (three) times a day as needed for bladder spasms for up to 2 days  -     Patient advised on urine discoloration as side effect of this medication  -     Printed educational material on macrobid and pyridium given to patient in [place] via AVS  -     Discussed with patient to follow up with [facility] if symptoms persist after UTI resolution    Spasm of lumbar paraspinous muscle  -     Advised patient to apply heating pad, take NSAIDs (declines naproxen refill today, says it was too expensive), try lidocaine patches or Bengay cream to the affected areas  -     Sent Rx to pharmacy for muscle relaxer to be used TID PRN   -     methocarbamol (ROBAXIN) 750 mg tablet; Take 1 tablet (750 mg total) by mouth every 8 (eight) hours  -     Advised patient that robaxin may make her sleepy, discussed not taking it if driving, etc   -     Patient declines recommendation to consider OMT   -     Printed educational material on robaxin given to patient in [place] via AVS    Follow up PRN  Will call when urine culture results are received, and tailor abx as appropriate      Future Appointments  Date Time Provider Pietro Galvan 4/10/2019 12:45 PM Heather Medina MICHELLE  [facility]          SUBJECTIVE  CC: Urinary Tract Infection (possible )      HPI:  Estela Manzano is a 67 y o  female who presents for:    Urinary Tract Infection  Patient complains of burning with urination, dysuria, frequency and incomplete bladder emptying  She has had symptoms for 2 weeks  Patient also complains of back pain, which feels like muscle tightness in her lower back, and states it is chronic  Patient denies fever, vaginal discharge and flank pain  Patient does not have a history of recurrent UTI  Patient does not have a history of pyelonephritis  However, she reports a history of bladder surgery (see below)  Surgeries were all done by [facility], Documented history of microscopic hematuria and mixed incontinence urgency, on toviaz 4 mg daily for OAB/UUI under their care, last appointment 1/16/18 with [facility]  Per [facility] records, patient's surgical history is as follows: Hysterectomy; Bladder surgery; Colporrhaphy (07/06/2016); Perineorrhaphy (07/06/2016); Cystourethroscopy (07/06/2016)    Review of Systems   Constitutional: Negative for chills and fever  Gastrointestinal: Negative for constipation, diarrhea and vomiting  Suprapubic tenderness   Genitourinary: Positive for dysuria, frequency and urgency  Negative for flank pain, hematuria, vaginal bleeding, vaginal discharge and vaginal pain  Musculoskeletal: Positive for back pain  All other systems reviewed and are negative        Historical Information   The patient history was reviewed as follows:    Past Medical History:   Diagnosis Date    Arthritis     Chest pain     Edema of lower extremity     Esophageal reflux     Hx of cardiac cath     Hypertension      Past Surgical History:   Procedure Laterality Date    BLADDER SURGERY       Family History   Problem Relation Age of Onset    COPD Mother     Emphysema Mother       Social History   History Alcohol Use No     History   Drug use: Unknown     History   Smoking Status    Never Smoker   Smokeless Tobacco    Not on file       Medications:     Current Outpatient Prescriptions:     ALPRAZolam (XANAX) 0 25 mg tablet, Take 1 tablet by mouth 3 (three) times a day, Disp: , Rfl:     atorvastatin (LIPITOR) 10 mg tablet, Take 1 tablet (10 mg total) by mouth daily, Disp: 90 tablet, Rfl: 3    clobetasol (TEMOVATE) 0 05 % cream, Apply 1 application topically 2 (two) times a day, Disp: , Rfl: 0    diltiazem (CARDIZEM CD) 240 mg 24 hr capsule, Take 1 capsule (240 mg total) by mouth daily, Disp: 90 capsule, Rfl: 6    diltiazem (TIAZAC) 240 MG 24 hr capsule, Take 1 capsule by mouth daily, Disp: , Rfl:     FLUoxetine (PROzac) 20 mg capsule, , Disp: , Rfl: 0    hydrocortisone 2 5 % cream, Apply 1 application topically 2 (two) times a day, Disp: , Rfl: 0    lisinopril-hydrochlorothiazide (PRINZIDE,ZESTORETIC) 20-12 5 MG per tablet, Take 1 tablet by mouth daily, Disp: 90 tablet, Rfl: 6    naproxen (NAPROSYN) 500 mg tablet, take 1 tablet by mouth every 12 hours with food if needed, Disp: , Rfl:     omeprazole (PriLOSEC) 20 mg delayed release capsule, Take 1 capsule (20 mg total) by mouth daily, Disp: 90 capsule, Rfl: 2    TOVIAZ 8 MG TB24, , Disp: , Rfl: 0    ergocalciferol (VITAMIN D2) 50,000 units, Take 1 capsule (50,000 Units total) by mouth once a week for 12 days, Disp: 8 capsule, Rfl: 0  No Known Allergies    OBJECTIVE    Vitals:   Vitals:    09/20/18 1800   BP: 140/80   Pulse: 82   Resp: 16   Temp: (!) 95 °F (35 °C)   Weight: 69 9 kg (154 lb)   Height: 5' 2" (1 575 m)       Physical Exam   Constitutional: She is oriented to person, place, and time  She appears well-developed and well-nourished  No distress  HENT:   Head: Normocephalic and atraumatic  Eyes:   Mild arcus senilis OU   Neck: Normal range of motion  Cardiovascular: Normal rate and regular rhythm      Pulmonary/Chest: Effort normal and breath sounds normal    Abdominal: Soft  Bowel sounds are normal    Mild tenderness in suprapubic abdomen to deep palpation, no CVA tenderness B/L   Musculoskeletal:   Mild tenderness and hypertonicity of B/L paraspinal lumbar musculature   Neurological: She is alert and oriented to person, place, and time  Skin: Skin is warm and dry  Psychiatric: She has a normal mood and affect  Her behavior is normal    Vitals reviewed       9/20/18 POCT urine dip auto non-scope   Component 9/20/18  6:24 PM    COLOR,UA yellow     CLARITY,UA hazy    SPECIFIC GRAVITY,UA 1 010     PH,UA 6    LEUKOCYTE ESTERASE,UA moderate    NITRITE,UA negative    GLUCOSE, UA negative    KETONES,UA negative     BILIRUBIN,UA negative     BLOOD,UA hemolyzed    SL AMB POCT URINE PROTEIN negative    SL AMB POCT UROBILINOGEN 0 2                    Duke Hampton MD, PGY-2  [facility] Medicine   9/20/2018

## 2018-09-21 LAB — BACTERIA UR CULT: NORMAL

## 2018-09-24 ENCOUNTER — TELEPHONE (OUTPATIENT)
Dept: FAMILY MEDICINE CLINIC | Facility: CLINIC | Age: 73
End: 2018-09-24

## 2018-10-28 DIAGNOSIS — K59.09 OTHER CONSTIPATION: Primary | ICD-10-CM

## 2018-10-28 RX ORDER — DOCUSATE SODIUM 100 MG/1
CAPSULE, LIQUID FILLED ORAL
Qty: 60 CAPSULE | Refills: 3 | Status: SHIPPED | OUTPATIENT
Start: 2018-10-28 | End: 2019-07-08 | Stop reason: SDUPTHER

## 2018-11-05 DIAGNOSIS — K21.9 GASTROESOPHAGEAL REFLUX DISEASE WITHOUT ESOPHAGITIS: ICD-10-CM

## 2018-11-05 RX ORDER — OMEPRAZOLE 20 MG/1
20 CAPSULE, DELAYED RELEASE ORAL DAILY
Qty: 60 CAPSULE | Refills: 0 | Status: SHIPPED | OUTPATIENT
Start: 2018-11-05 | End: 2019-04-04 | Stop reason: SDUPTHER

## 2018-11-05 NOTE — TELEPHONE ENCOUNTER
I will refill medication for 2 months  I've never seen this patient and this medication is on Beer's list for her age  Could you please schedule an appointment with me to follow up on her chronic problems? Thank you!

## 2018-12-13 NOTE — PROGRESS NOTES
Cardiology Follow Up    Betty Caabn  1945  [id number]  [address] [facility] [address]  [facility]  [phone]    1  Essential (primary) hypertension  POCT ECG   2  Pure hypercholesterolemia     3  Aortic valve disease     4  Chest pain, unspecified type         Interval History:  Patient is here for a follow-up visit  She was last seen by me in February of last year  Her most recent echocardiogram done in August of last year demonstrated preserved LV systolic function with mild aortic valve stenosis  The valve mean gradient was 12 mm of mercury  The peak continuous wave velocity across the aortic valve was 2 28 m/sec  She had a pharmacologic nuclear stress test done February 2014 which demonstrated no evidence of prognostically important ischemia  Patient had a lipid profile done in April of this year which looked good  Patient is here because she has had solis intermittent pinching on the left side of her chest   It sometimes occurs when she lies down  It is not related to physical activity  Her EKG demonstrates sinus rhythm with an IVCD with no significant change compared to a prior tracing done August 14, 2015  She is here today with her daughter  She has had no dyspnea  She has known mild aortic valve stenosis      Patient Active Problem List   Diagnosis    Aortic valve calcification    Cerumen impaction    Dizziness    Hypercholesterolemia    Hypertension    Vitamin D deficiency    Spasm of lumbar paraspinous muscle    Dysuria    Urinary frequency    Suprapubic tenderness     Past Medical History:   Diagnosis Date    Arthritis     Chest pain     Edema of lower extremity     Esophageal reflux     Hx of cardiac cath     Hypertension      Social History     Social History    Marital status: Single     Spouse name: N/A    Number of children: N/A    Years of education: N/A Occupational History    Not on file       Social History Main Topics    Smoking status: Never Smoker    Smokeless tobacco: Never Used    Alcohol use No    Drug use: Unknown    Sexual activity: Not on file     Other Topics Concern    Not on file     Social History Narrative    Caffeine use    Tea use      Family History   Problem Relation Age of Onset    COPD Mother     Emphysema Mother      Past Surgical History:   Procedure Laterality Date    BLADDER SURGERY         Current Outpatient Prescriptions:     ALPRAZolam (XANAX) 0 25 mg tablet, Take 1 tablet by mouth 3 (three) times a day, Disp: , Rfl:     atorvastatin (LIPITOR) 10 mg tablet, Take 1 tablet (10 mg total) by mouth daily, Disp: 90 tablet, Rfl: 3    clobetasol (TEMOVATE) 0 05 % cream, Apply 1 application topically 2 (two) times a day, Disp: , Rfl: 0    diltiazem (CARDIZEM CD) 240 mg 24 hr capsule, Take 1 capsule (240 mg total) by mouth daily, Disp: 90 capsule, Rfl: 6    diltiazem (TIAZAC) 240 MG 24 hr capsule, Take 1 capsule by mouth daily, Disp: , Rfl:     FLUAD 0 5 ML BERNABE, inject 0 5 milliliter intramuscularly, Disp: , Rfl: 0    FLUoxetine (PROzac) 20 mg capsule, , Disp: , Rfl: 0    hydrocortisone 2 5 % cream, Apply 1 application topically 2 (two) times a day, Disp: , Rfl: 0    lisinopril-hydrochlorothiazide (PRINZIDE,ZESTORETIC) 20-12 5 MG per tablet, Take 1 tablet by mouth daily, Disp: 90 tablet, Rfl: 6    methocarbamol (ROBAXIN) 750 mg tablet, Take 1 tablet (750 mg total) by mouth every 8 (eight) hours, Disp: 30 tablet, Rfl: 0    naproxen (NAPROSYN) 500 mg tablet, take 1 tablet by mouth every 12 hours with food if needed, Disp: , Rfl:     omeprazole (PriLOSEC) 20 mg delayed release capsule, Take 1 capsule (20 mg total) by mouth daily, Disp: 60 capsule, Rfl: 0    RA COL-RITE 100 MG capsule, take 1 capsule by mouth twice a day, Disp: 60 capsule, Rfl: 3    TOVIAZ 8 MG TB24, , Disp: , Rfl: 0    ergocalciferol (VITAMIN D2) 50,000 units, Take 1 capsule (50,000 Units total) by mouth once a week for 12 days, Disp: 8 capsule, Rfl: 0  No Known Allergies    Labs:not applicable  Imaging: No results found  Review of Systems:  Review of Systems   Cardiovascular: Positive for chest pain  All other systems reviewed and are negative  Physical Exam:  /78 (BP Location: Right arm, Patient Position: Sitting, Cuff Size: Standard)   Pulse 60   Ht 5' 4" (1 626 m)   Wt 69 4 kg (153 lb)   BMI 26 26 kg/m²   Physical Exam   Constitutional: She is oriented to person, place, and time  She appears well-developed and well-nourished  HENT:   Head: Normocephalic and atraumatic  Eyes: Pupils are equal, round, and reactive to light  Conjunctivae and EOM are normal    Neck: Normal range of motion  Neck supple  Cardiovascular: Normal rate  Murmur heard  Pulmonary/Chest: Effort normal and breath sounds normal    Neurological: She is alert and oriented to person, place, and time  Skin: Skin is warm and dry  Psychiatric: She has a normal mood and affect  Vitals reviewed  Discussion/Summary:  I will arrange cardiac testing although I have a low index of suspicion  We will check a pharmacologic stress test given her IVCD  I will check an echocardiogram to assess LV wall thickness and systolic function  Her blood pressure is fine today  I have asked her to call if there is a problem in the interim otherwise I will see her in six months time in sooner as is necessary

## 2018-12-14 RX ORDER — A/SINGAPORE/GP1908/2015 IVR-180 (H1N1) (AN A/MICHIGAN/45/2015 (H1N1)PDM09-LIKE VIRUS), A/HONG KONG/4801/2014, NYMC X-263B (H3N2) (AN A/HONG KONG/4801/2014-LIKE VIRUS), AND B/BRISBANE/60/2008, WILD TYPE (A B/BRISBANE/60/2008-LIKE VIRUS) 15; 15; 15 UG/.5ML; UG/.5ML; UG/.5ML
INJECTION, SUSPENSION INTRAMUSCULAR
Refills: 0 | COMMUNITY
Start: 2018-10-19

## 2018-12-17 ENCOUNTER — OFFICE VISIT (OUTPATIENT)
Dept: CARDIOLOGY CLINIC | Facility: CLINIC | Age: 73
End: 2018-12-17
Payer: COMMERCIAL

## 2018-12-17 VITALS
DIASTOLIC BLOOD PRESSURE: 78 MMHG | WEIGHT: 153 LBS | BODY MASS INDEX: 26.12 KG/M2 | SYSTOLIC BLOOD PRESSURE: 130 MMHG | HEART RATE: 60 BPM | HEIGHT: 64 IN

## 2018-12-17 DIAGNOSIS — R07.9 CHEST PAIN, UNSPECIFIED TYPE: ICD-10-CM

## 2018-12-17 DIAGNOSIS — E78.00 PURE HYPERCHOLESTEROLEMIA: ICD-10-CM

## 2018-12-17 DIAGNOSIS — I35.9 AORTIC VALVE DISEASE: ICD-10-CM

## 2018-12-17 DIAGNOSIS — I10 ESSENTIAL (PRIMARY) HYPERTENSION: Primary | ICD-10-CM

## 2018-12-17 PROCEDURE — 99214 OFFICE O/P EST MOD 30 MIN: CPT | Performed by: INTERNAL MEDICINE

## 2018-12-17 PROCEDURE — 4040F PNEUMOC VAC/ADMIN/RCVD: CPT | Performed by: INTERNAL MEDICINE

## 2018-12-17 PROCEDURE — 93000 ELECTROCARDIOGRAM COMPLETE: CPT | Performed by: INTERNAL MEDICINE

## 2018-12-17 NOTE — PATIENT INSTRUCTIONS
I will order a pharmacologic stress test and echocardiogram   Please call if there is a problem in the interim  I will see you at the follow-up visit

## 2019-01-03 ENCOUNTER — HOSPITAL ENCOUNTER (OUTPATIENT)
Dept: NON INVASIVE DIAGNOSTICS | Facility: HOSPITAL | Age: 74
Discharge: HOME | End: 2019-01-03
Attending: INTERNAL MEDICINE
Payer: COMMERCIAL

## 2019-01-03 DIAGNOSIS — I10 ESSENTIAL (PRIMARY) HYPERTENSION: ICD-10-CM

## 2019-01-03 DIAGNOSIS — E78.00 PURE HYPERCHOLESTEROLEMIA: ICD-10-CM

## 2019-01-03 DIAGNOSIS — R07.9 CHEST PAIN, UNSPECIFIED TYPE: ICD-10-CM

## 2019-01-03 DIAGNOSIS — I35.9 AORTIC VALVE DISEASE: ICD-10-CM

## 2019-01-03 PROCEDURE — 93306 TTE W/DOPPLER COMPLETE: CPT

## 2019-01-04 PROCEDURE — 93306 TTE W/DOPPLER COMPLETE: CPT | Performed by: INTERNAL MEDICINE

## 2019-01-08 ENCOUNTER — TELEPHONE (OUTPATIENT)
Dept: INTERNAL MEDICINE CLINIC | Facility: CLINIC | Age: 74
End: 2019-01-08

## 2019-01-08 NOTE — TELEPHONE ENCOUNTER
PATIENT IS IN NEED OF REFILLS ON HER HYDROCORTISONE CREAM PRESCRIBED BY DR Tommy Colunga  HAS UPCOMING APPT WITH DR NINA IN April 2019  PLACING IN DERM PR[facility] [place]

## 2019-01-10 ENCOUNTER — OFFICE VISIT (OUTPATIENT)
Dept: FAMILY MEDICINE CLINIC | Facility: CLINIC | Age: 74
End: 2019-01-10

## 2019-01-10 VITALS
SYSTOLIC BLOOD PRESSURE: 140 MMHG | BODY MASS INDEX: 26.36 KG/M2 | HEART RATE: 78 BPM | HEIGHT: 64 IN | DIASTOLIC BLOOD PRESSURE: 80 MMHG | WEIGHT: 154.4 LBS | TEMPERATURE: 98.8 F | RESPIRATION RATE: 18 BRPM
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
## 2019-01-10 DIAGNOSIS — M62.830 SPASM OF LUMBAR PARASPINOUS MUSCLE: ICD-10-CM

## 2019-01-10 PROCEDURE — 99213 OFFICE O/P EST LOW 20 MIN: CPT | Performed by: FAMILY MEDICINE

## 2019-01-10 RX ORDER — CYCLOBENZAPRINE HCL 5 MG
5 TABLET ORAL
Qty: 30 TABLET | Refills: 0 | Status: SHIPPED | OUTPATIENT
Start: 2019-01-10 | End: 2019-05-31 | Stop reason: ALTCHOICE

## 2019-01-10 NOTE — TELEPHONE ENCOUNTER
DR BOSWELL WROTE OUT PRESCRIPTIONS AND WITH REFILLS  TRIED TO CALL PATIENT TO MAKE HER AWARE BUT NO RESPONSE, LEFT VOICEMAIL ADVISING PATIENT THAT PRESCRIPTIONS ARE FAXED TO PHARMACY

## 2019-01-10 NOTE — ASSESSMENT & PLAN NOTE
Status: new  Right knee pain:  No bony tenderness, no swelling, mild tender to palpation  Good range of motion  Patient able to bear weight, but has pain  X-ray of knee  Left wrist pain:  Mild bony tenderness, mild swelling, tenderness to palpation without obvious bony defects  Decreased range of motion of wrist flexion  Ordered:  X-ray of left wrist  No focal neuro deficits  Take Tylenol for pain, avoid NSAIDs  Follow-up with results of imaging studies  Informed patient that if symptoms worsen she should be seen in the emergency department for further evaluation    Fall precautions handout given

## 2019-01-10 NOTE — PATIENT INSTRUCTIONS
Acute Low Back Pain   AMBULATORY CARE:   Acute low back pain  is sudden discomfort in your lower back area that lasts for up to 6 weeks  The discomfort makes it difficult to tolerate activity  Common symptoms include the following:   · Back stiffness or spasms    · Pain down the back or side of one leg    · Holding yourself in an unusual position or posture to decrease your back pain    · Not being able to find a sitting position that is comfortable    · Slow increase in your pain for 24 to 48 hours after you stress your back    · Tenderness on your lower back or severe pain when you move your back  Seek immediate care for the following symptoms:   · Severe pain    · Sudden stiffness and heaviness in both buttocks down to both legs    · Numbness or weakness in one leg, or pain in both legs    · Numbness in your genital area or across your lower back    · Unable to control your urine or bowel movements  Contact your healthcare provider if:   · You have a fever  · You have pain at night or when you rest     · Your pain does not get better with treatment  · You have pain that worsens when you cough or sneeze  · You suddenly feel something pop or snap in your back  · You have questions or concerns about your condition or care  The goal of treatment for acute low back pain  is to relieve your pain and help you tolerate activity  Most people with acute lower back pain get better within 4 to 6 weeks  You may need any of the following:  · Acetaminophen  decreases pain  It is available without a doctor's order  Ask how much to take and how often to take it  Follow directions  Acetaminophen can cause liver damage if not taken correctly  · NSAIDs  help decrease swelling and pain  This medicine is available with or without a doctor's order  NSAIDs can cause stomach bleeding or kidney problems in certain people   If you take blood thinner medicine, always ask your healthcare provider if NSAIDs are safe for you  Always read the medicine label and follow directions  · Prescription pain medicine  may be given  Ask your healthcare provider how to take this medicine safely  · Muscle relaxers  decrease pain by relaxing the muscles in your lower spine  Manage your symptoms:   · Stay active  as much as you can without causing more pain  Bed rest could make your back pain worse  Start with some light exercises such as walking  Avoid heavy lifting until your pain is gone  Ask for more information about the activities or exercises that are right for you  · Ice  helps decrease swelling, pain, and muscle spams  Put crushed ice in a plastic bag  Cover it with a towel  Place it on your lower back for 20 to 30 minutes every 2 hours  Do this for about 2 to 3 days after your pain starts, or as directed  · Heat  helps decrease pain and muscle spasms  Start to use heat after treatment with ice has stopped  Use a small towel dampened with warm water or a heating pad, or sit in a warm bath  Apply heat on the area for 20 to 30 minutes every 2 hours for as many days as directed  Alternate heat and ice  Prevent acute low back pain:   · Use proper body mechanics  ¨ Bend at the hips and knees when you  objects  Do not bend from the waist  Use your leg muscles as you lift the load  Do not use your back  Keep the object close to your chest as you lift it  Try not to twist or lift anything above your waist     ¨ Change your position often when you stand for long periods of time  Rest one foot on a small box or footrest, and then switch to the other foot often  ¨ Try not to sit for long periods of time  When you do, sit in a straight-backed chair with your feet flat on the floor  Never reach, pull, or push while you are sitting  · Do exercises that strengthen your back muscles  Warm up before you exercise  Ask your healthcare provider the best exercises for you  · Maintain a healthy weight    Ask your healthcare provider how much you should weigh  Ask him to help you create a weight loss plan if you are overweight  Follow up with your healthcare provider as directed:  Return for a follow-up visit if you still have pain after 1 to 3 weeks of treatment  You may need to visit an orthopedist if your back pain lasts more than 12 weeks  Write down your questions so you remember to ask them during your visits  © 2017 [employer] Information is for End User's use only and may not be sold, redistributed or otherwise used for commercial purposes  All illustrations and images included in CareNotes® are the copyrighted property of [employer]  or Geoff Christopher  The above information is an  only  It is not intended as medical advice for individual conditions or treatments  Talk to your doctor, nurse or pharmacist before following any medical regimen to see if it is safe and effective for you

## 2019-01-10 NOTE — PROGRESS NOTES
Johana Humphries 1945 female MRN: [id number]    Acute Visit        ASSESSMENT/PLAN  Problem List Items Addressed This Visit     Spasm of lumbar paraspinous muscle     Status: acutely worse  2/2 to recent fall  No spinous process tenderness  No red flags for cauda equina syndrome  Likely due to muscular strain  Continue using heating pad on lower back  Tylenol p r n  Recommended avoiding NSAIDs  Prescribed cyclobenzaprine 5 mg Q HS  Informed patient that she should avoid driving after taking medication  Follow-up if pain worsens and we will consider further imaging         Relevant Medications    cyclobenzaprine (FLEXERIL) 5 mg tablet    Fall (on) (from) other stairs and steps, initial encounter - Primary     Status: new  Right knee pain:  No bony tenderness, no swelling, mild tender to palpation  Good range of motion  Patient able to bear weight, but has pain  X-ray of knee  Left wrist pain:  Mild bony tenderness, mild swelling, tenderness to palpation without obvious bony defects  Decreased range of motion of wrist flexion  Ordered:  X-ray of left wrist  Take Tylenol for pain, avoid NSAIDs  Follow-up with results of imaging studies  Informed patient that if symptoms worsen she should be seen in the emergency department for further evaluation  Fall precautions handout given         Relevant Orders    XR knee 4+ vw right injury    XR wrist 3+ vw left                Future Appointments  Date Time Provider Pietro Galvan   1/24/2019 2:00 PM MD FELICIA Dorantes [facility]   4/10/2019 12:45 PM Gill DUARTE [facility] BE [facility]        SUBJECTIVE  CC: Follow-up (Pt fell off of ladder  )       68year old female presents for initial fall from step-ladder of about 3 feet  Occurred 3 days ago and patient refused to be seen in the ED  Daughter is present with patient as   Patient fell forward, hit chest on ladder but did not fall onto ground   Denies hitting head or loss of consiousness  Attempted to catch herself and complains of swollen left forearm and wrist with tingling in left fingers  Also complains of left knee pain with small contusion and worsening back pain  Pain in back is chronic but is now worsened since fall  Denies saddle anesthesia or incontinence  Patient has only taken tylenol with mild relief of pain  Previously on flexeril which helped with her back pain  Review of Systems   Constitutional: Negative for activity change, chills and fever  HENT: Negative  Negative for sinus pressure and sore throat  Respiratory: Negative for cough, chest tightness, shortness of breath and wheezing  Cardiovascular: Negative for chest pain, palpitations and leg swelling  Gastrointestinal: Negative for abdominal pain, blood in stool, constipation, diarrhea, nausea and vomiting  Genitourinary: Negative for dysuria, frequency and hematuria  Musculoskeletal: Positive for back pain  Negative for arthralgias, gait problem, joint swelling, myalgias, neck pain and neck stiffness  Skin: Negative for color change  Neurological: Negative for dizziness, syncope, weakness, light-headedness, numbness and headaches         Historical Information   The patient history was reviewed as follows:  Past Medical History:   Diagnosis Date    Arthritis     Chest pain     Edema of lower extremity     Esophageal reflux     Hx of cardiac cath     Hypertension      Past Surgical History:   Procedure Laterality Date    BLADDER SURGERY       Family History   Problem Relation Age of Onset    COPD Mother     Emphysema Mother       Social History   History   Alcohol Use No     History   Drug use: Unknown     History   Smoking Status    Never Smoker   Smokeless Tobacco    Never Used       Medications:   Meds/Allergies   Current Outpatient Prescriptions   Medication Sig Dispense Refill    ALPRAZolam (XANAX) 0 25 mg tablet Take 1 tablet by mouth 3 (three) times a day      atorvastatin (LIPITOR) 10 mg tablet Take 1 tablet (10 mg total) by mouth daily 90 tablet 3    clobetasol (TEMOVATE) 0 05 % cream Apply 1 application topically 2 (two) times a day  0    cyclobenzaprine (FLEXERIL) 5 mg tablet Take 1 tablet (5 mg total) by mouth daily at bedtime for 14 days 30 tablet 0    diltiazem (CARDIZEM CD) 240 mg 24 hr capsule Take 1 capsule (240 mg total) by mouth daily 90 capsule 6    diltiazem (TIAZAC) 240 MG 24 hr capsule Take 1 capsule by mouth daily      ergocalciferol (VITAMIN D2) 50,000 units Take 1 capsule (50,000 Units total) by mouth once a week for 12 days 8 capsule 0    FLUAD 0 5 ML BERNABE inject 0 5 milliliter intramuscularly  0    FLUoxetine (PROzac) 20 mg capsule   0    hydrocortisone 2 5 % cream Apply 1 application topically 2 (two) times a day  0    lisinopril-hydrochlorothiazide (PRINZIDE,ZESTORETIC) 20-12 5 MG per tablet Take 1 tablet by mouth daily 90 tablet 6    methocarbamol (ROBAXIN) 750 mg tablet Take 1 tablet (750 mg total) by mouth every 8 (eight) hours 30 tablet 0    naproxen (NAPROSYN) 500 mg tablet take 1 tablet by mouth every 12 hours with food if needed      omeprazole (PriLOSEC) 20 mg delayed release capsule Take 1 capsule (20 mg total) by mouth daily 60 capsule 0    RA COL-RITE 100 MG capsule take 1 capsule by mouth twice a day 60 capsule 3    TOVIAZ 8 MG TB24   0     No current facility-administered medications for this visit  No Known Allergies    OBJECTIVE  Vitals:   Vitals:    01/10/19 1343   BP: 140/80   Pulse: 78   Resp: 18   Temp: 98 8 °F (37 1 °C)   Weight: 70 kg (154 lb 6 4 oz)   Height: 5' 4" (1 626 m)       Invasive Devices          No matching active lines, drains, or airways          Physical Exam   Constitutional: She is oriented to person, place, and time  She appears well-developed and well-nourished  No distress  HENT:   Head: Normocephalic and atraumatic     Right Ear: External ear normal    Left Ear: External ear normal  Nose: Nose normal    Mouth/Throat: Oropharynx is clear and moist  No oropharyngeal exudate  Eyes: Pupils are equal, round, and reactive to light  Conjunctivae are normal  Right eye exhibits no discharge  Left eye exhibits no discharge  No scleral icterus  Neck: Normal range of motion  Neck supple  No JVD present  No tracheal deviation present  No thyromegaly present  Cardiovascular: Normal rate, regular rhythm, normal heart sounds and intact distal pulses  Exam reveals no gallop and no friction rub  No murmur heard  Pulmonary/Chest: Effort normal and breath sounds normal  No respiratory distress  She has no wheezes  She has no rales  She exhibits no tenderness  Abdominal: Soft  Bowel sounds are normal  She exhibits no distension  There is no tenderness  There is no rebound and no guarding  Musculoskeletal: She exhibits tenderness  She exhibits no edema or deformity  Right wrist: Normal         Left wrist: She exhibits decreased range of motion, tenderness, bony tenderness and swelling  She exhibits no effusion, no crepitus, no deformity and no laceration  Right knee: She exhibits swelling and ecchymosis  She exhibits normal range of motion, no effusion, no deformity, no laceration, no erythema, normal alignment, no bony tenderness and normal meniscus  Lumbar back: She exhibits tenderness, pain and spasm  She exhibits no bony tenderness, no swelling, no edema, no deformity and no laceration  Neurological: She is alert and oriented to person, place, and time  She displays normal reflexes  No cranial nerve deficit  She exhibits normal muscle tone  Coordination normal    Skin: Skin is warm and dry  No rash noted  She is not diaphoretic  No erythema  Vitals reviewed  Lab:  I have personally reviewed all pertinent results

## 2019-01-10 NOTE — ASSESSMENT & PLAN NOTE
Status: acutely worse  2/2 to recent fall  No spinous process tenderness  No red flags for cauda equina syndrome  Likely due to muscular strain  Continue using heating pad on lower back  Tylenol p r n  Recommended avoiding NSAIDs  Prescribed cyclobenzaprine 5 mg Q HS  Informed patient that she should avoid driving after taking medication    Follow-up if pain worsens and we will consider further imaging

## 2019-01-14 ENCOUNTER — TRANSCRIBE ORDERS (OUTPATIENT)
Dept: RADIOLOGY | Facility: HOSPITAL | Age: 74
End: 2019-01-14

## 2019-01-14 ENCOUNTER — HOSPITAL ENCOUNTER (OUTPATIENT)
Dept: RADIOLOGY | Facility: HOSPITAL | Age: 74
Discharge: HOME | End: 2019-01-14
Payer: COMMERCIAL
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
## 2019-01-14 PROCEDURE — 73564 X-RAY EXAM KNEE 4 OR MORE: CPT

## 2019-01-14 PROCEDURE — 73110 X-RAY EXAM OF WRIST: CPT

## 2019-01-17 ENCOUNTER — TELEPHONE (OUTPATIENT)
Dept: OTHER | Facility: HOSPITAL | Age: 74
End: 2019-01-17

## 2019-01-17 NOTE — TELEPHONE ENCOUNTER
Left message for Abelardo He letting her know that the x-ray results for her left wrist and right knee were normal  If patient is still feeling pain and is unable to walk please come back to the office for evaluation

## 2019-04-04 DIAGNOSIS — K21.9 GASTROESOPHAGEAL REFLUX DISEASE WITHOUT ESOPHAGITIS: ICD-10-CM

## 2019-04-05 ENCOUNTER — TELEPHONE (OUTPATIENT)
Dept: INTERNAL MEDICINE CLINIC | Facility: CLINIC | Age: 74
End: 2019-04-05

## 2019-04-05 RX ORDER — OMEPRAZOLE 20 MG/1
20 CAPSULE, DELAYED RELEASE ORAL DAILY
Qty: 90 CAPSULE | Refills: 1 | Status: SHIPPED | OUTPATIENT
Start: 2019-04-05 | End: 2019-09-20 | Stop reason: SDUPTHER

## 2019-04-08 DIAGNOSIS — R21 RASH: Primary | ICD-10-CM

## 2019-05-22 DIAGNOSIS — I10 HYPERTENSION, UNSPECIFIED TYPE: ICD-10-CM

## 2019-05-22 RX ORDER — LISINOPRIL AND HYDROCHLOROTHIAZIDE 20; 12.5 MG/1; MG/1
TABLET ORAL
Qty: 90 TABLET | Refills: 4 | Status: SHIPPED | OUTPATIENT
Start: 2019-05-22 | End: 2020-05-08 | Stop reason: SDUPTHER

## 2019-05-31 ENCOUNTER — OFFICE VISIT (OUTPATIENT)
Dept: FAMILY MEDICINE CLINIC | Facility: CLINIC | Age: 74
End: 2019-05-31

## 2019-05-31 VITALS
RESPIRATION RATE: 14 BRPM | DIASTOLIC BLOOD PRESSURE: 78 MMHG | BODY MASS INDEX: 25.99 KG/M2 | HEART RATE: 74 BPM | TEMPERATURE: 98.2 F | SYSTOLIC BLOOD PRESSURE: 142 MMHG | HEIGHT: 64 IN | WEIGHT: 152.2 LBS

## 2019-05-31 DIAGNOSIS — I10 HYPERTENSION, UNSPECIFIED TYPE: ICD-10-CM

## 2019-05-31 DIAGNOSIS — M62.830 SPASM OF LUMBAR PARASPINOUS MUSCLE: ICD-10-CM

## 2019-05-31 DIAGNOSIS — R42 DIZZINESS: Primary | ICD-10-CM

## 2019-05-31 DIAGNOSIS — E03.9 HYPOTHYROIDISM, UNSPECIFIED TYPE: ICD-10-CM

## 2019-05-31 PROCEDURE — 99213 OFFICE O/P EST LOW 20 MIN: CPT | Performed by: FAMILY MEDICINE

## 2019-05-31 RX ORDER — MECLIZINE HCL 12.5 MG/1
12.5 TABLET ORAL EVERY 8 HOURS PRN
Qty: 30 TABLET | Refills: 0 | Status: SHIPPED | OUTPATIENT
Start: 2019-05-31 | End: 2021-06-15

## 2019-05-31 RX ORDER — DILTIAZEM HYDROCHLORIDE 240 MG/1
240 CAPSULE, COATED, EXTENDED RELEASE ORAL DAILY
Qty: 90 CAPSULE | Refills: 1 | Status: SHIPPED | OUTPATIENT
Start: 2019-05-31 | End: 2019-11-21 | Stop reason: SDUPTHER

## 2019-05-31 RX ORDER — METHOCARBAMOL 500 MG/1
500 TABLET, FILM COATED ORAL EVERY 8 HOURS SCHEDULED
Qty: 90 TABLET | Refills: 1 | Status: SHIPPED | OUTPATIENT
Start: 2019-05-31 | End: 2020-07-21

## 2019-06-14 ENCOUNTER — OFFICE VISIT (OUTPATIENT)
Dept: LAB | Facility: HOSPITAL | Age: 74
End: 2019-06-14
Payer: COMMERCIAL

## 2019-06-14 ENCOUNTER — APPOINTMENT (OUTPATIENT)
Dept: LAB | Facility: HOSPITAL | Age: 74
End: 2019-06-14
Payer: COMMERCIAL

## 2019-06-14 ENCOUNTER — TRANSCRIBE ORDERS (OUTPATIENT)
Dept: LAB | Facility: HOSPITAL | Age: 74
End: 2019-06-14

## 2019-06-14 DIAGNOSIS — Z79.899 ENCOUNTER FOR LONG-TERM (CURRENT) USE OF OTHER MEDICATIONS: ICD-10-CM

## 2019-06-14 DIAGNOSIS — E03.9 MYXEDEMA HEART DISEASE: ICD-10-CM

## 2019-06-14 DIAGNOSIS — I51.9 MYXEDEMA HEART DISEASE: ICD-10-CM

## 2019-06-14 DIAGNOSIS — F25.0 SCHIZOAFFECTIVE DISORDER, BIPOLAR TYPE (HCC): ICD-10-CM

## 2019-06-14 DIAGNOSIS — E03.9 HYPOTHYROIDISM, UNSPECIFIED TYPE: ICD-10-CM

## 2019-06-14 DIAGNOSIS — F25.0 SCHIZOAFFECTIVE DISORDER, BIPOLAR TYPE (HCC): Primary | ICD-10-CM

## 2019-06-14 LAB
ALBUMIN SERPL BCP-MCNC: 3.6 G/DL (ref 3.5–5)
ALP SERPL-CCNC: 77 U/L (ref 46–116)
ALT SERPL W P-5'-P-CCNC: 25 U/L (ref 12–78)
ANION GAP SERPL CALCULATED.3IONS-SCNC: 7 MMOL/L (ref 4–13)
AST SERPL W P-5'-P-CCNC: 17 U/L (ref 5–45)
ATRIAL RATE: 66 BPM
BASOPHILS # BLD AUTO: 0.05 THOUSANDS/ΜL (ref 0–0.1)
BASOPHILS NFR BLD AUTO: 0 % (ref 0–1)
BILIRUB SERPL-MCNC: 0.44 MG/DL (ref 0.2–1)
BUN SERPL-MCNC: 20 MG/DL (ref 5–25)
CALCIUM SERPL-MCNC: 9.2 MG/DL (ref 8.3–10.1)
CHLORIDE SERPL-SCNC: 103 MMOL/L (ref 100–108)
CO2 SERPL-SCNC: 29 MMOL/L (ref 21–32)
CREAT SERPL-MCNC: 0.85 MG/DL (ref 0.6–1.3)
EOSINOPHIL # BLD AUTO: 0.12 THOUSAND/ΜL (ref 0–0.61)
EOSINOPHIL NFR BLD AUTO: 1 % (ref 0–6)
ERYTHROCYTE [DISTWIDTH] IN BLOOD BY AUTOMATED COUNT: 13.6 % (ref 11.6–15.1)
GFR SERPL CREATININE-BSD FRML MDRD: 68 ML/MIN/1.73SQ M
GLUCOSE P FAST SERPL-MCNC: 83 MG/DL (ref 65–99)
HCT VFR BLD AUTO: 38 % (ref 34.8–46.1)
HGB BLD-MCNC: 12.2 G/DL (ref 11.5–15.4)
IMM GRANULOCYTES # BLD AUTO: 0.09 THOUSAND/UL (ref 0–0.2)
IMM GRANULOCYTES NFR BLD AUTO: 1 % (ref 0–2)
LYMPHOCYTES # BLD AUTO: 3.15 THOUSANDS/ΜL (ref 0.6–4.47)
LYMPHOCYTES NFR BLD AUTO: 24 % (ref 14–44)
MCH RBC QN AUTO: 28.6 PG (ref 26.8–34.3)
MCHC RBC AUTO-ENTMCNC: 32.1 G/DL (ref 31.4–37.4)
MCV RBC AUTO: 89 FL (ref 82–98)
MONOCYTES # BLD AUTO: 0.87 THOUSAND/ΜL (ref 0.17–1.22)
MONOCYTES NFR BLD AUTO: 7 % (ref 4–12)
NEUTROPHILS # BLD AUTO: 8.96 THOUSANDS/ΜL (ref 1.85–7.62)
NEUTS SEG NFR BLD AUTO: 67 % (ref 43–75)
NRBC BLD AUTO-RTO: 0 /100 WBCS
P AXIS: 35 DEGREES
PLATELET # BLD AUTO: 414 THOUSANDS/UL (ref 149–390)
PMV BLD AUTO: 10.5 FL (ref 8.9–12.7)
POTASSIUM SERPL-SCNC: 3.4 MMOL/L (ref 3.5–5.3)
PR INTERVAL: 186 MS
PROT SERPL-MCNC: 7.2 G/DL (ref 6.4–8.2)
QRS AXIS: 22 DEGREES
QRSD INTERVAL: 118 MS
QT INTERVAL: 412 MS
QTC INTERVAL: 431 MS
RBC # BLD AUTO: 4.26 MILLION/UL (ref 3.81–5.12)
SODIUM SERPL-SCNC: 139 MMOL/L (ref 136–145)
T WAVE AXIS: 74 DEGREES
T4 FREE SERPL-MCNC: 1.01 NG/DL (ref 0.76–1.46)
TSH SERPL DL<=0.05 MIU/L-ACNC: 2.43 UIU/ML (ref 0.36–3.74)
VENTRICULAR RATE: 66 BPM
WBC # BLD AUTO: 13.24 THOUSAND/UL (ref 4.31–10.16)

## 2019-06-14 PROCEDURE — 84443 ASSAY THYROID STIM HORMONE: CPT

## 2019-06-14 PROCEDURE — 80053 COMPREHEN METABOLIC PANEL: CPT

## 2019-06-14 PROCEDURE — 36415 COLL VENOUS BLD VENIPUNCTURE: CPT

## 2019-06-14 PROCEDURE — 84439 ASSAY OF FREE THYROXINE: CPT

## 2019-06-14 PROCEDURE — 85025 COMPLETE CBC W/AUTO DIFF WBC: CPT

## 2019-06-14 PROCEDURE — 93010 ELECTROCARDIOGRAM REPORT: CPT | Performed by: INTERNAL MEDICINE

## 2019-06-14 PROCEDURE — 93005 ELECTROCARDIOGRAM TRACING: CPT

## 2019-07-05 ENCOUNTER — TELEPHONE (OUTPATIENT)
Dept: INTERNAL MEDICINE CLINIC | Facility: CLINIC | Age: 74
End: 2019-07-05

## 2019-07-05 NOTE — TELEPHONE ENCOUNTER
Patient's daughter called stating that the pharmacy is giving her a hard time with picking up Synalar 0 025 cream and Nizoral cream 60 gram   [facility] pharmacy informed her they need to speak with Dr Nesbitt before giving the prescription to patient

## 2019-07-08 DIAGNOSIS — K59.09 OTHER CONSTIPATION: ICD-10-CM

## 2019-07-08 RX ORDER — DOCUSATE SODIUM 100 MG/1
CAPSULE ORAL
Qty: 60 CAPSULE | Refills: 3 | Status: SHIPPED | OUTPATIENT
Start: 2019-07-08 | End: 2021-01-29 | Stop reason: SDUPTHER

## 2019-07-09 NOTE — TELEPHONE ENCOUNTER
Called patient to inform her that I called [facility] pharmacy to see as to what they needed, and they stated they did not need anything  I had them refill the synalar, and nizoral cream  Pharmacy stated the synalar is out of stock but they will have it by tomorrow evening  Nizoral is already filled  Called patient to make her aware, patient did not answer  Left voicemail for patient to call back

## 2019-08-22 DIAGNOSIS — E78.00 HYPERCHOLESTEROLEMIA: ICD-10-CM

## 2019-08-22 RX ORDER — ATORVASTATIN CALCIUM 10 MG/1
10 TABLET, FILM COATED ORAL DAILY
Qty: 90 TABLET | Refills: 3 | Status: ON HOLD | OUTPATIENT
Start: 2019-08-22 | End: 2020-04-09 | Stop reason: SDUPTHER

## 2019-09-20 ENCOUNTER — OFFICE VISIT (OUTPATIENT)
Dept: FAMILY MEDICINE CLINIC | Facility: CLINIC | Age: 74
End: 2019-09-20

## 2019-09-20 VITALS
RESPIRATION RATE: 16 BRPM | HEIGHT: 64 IN | DIASTOLIC BLOOD PRESSURE: 80 MMHG | BODY MASS INDEX: 25.57 KG/M2 | WEIGHT: 149.8 LBS | TEMPERATURE: 98.4 F | HEART RATE: 72 BPM | SYSTOLIC BLOOD PRESSURE: 140 MMHG

## 2019-09-20 DIAGNOSIS — Z12.39 SCREENING FOR BREAST CANCER: ICD-10-CM
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
## 2019-09-20 DIAGNOSIS — R42 DIZZINESS: ICD-10-CM

## 2019-09-20 DIAGNOSIS — M62.830 SPASM OF LUMBAR PARASPINOUS MUSCLE: ICD-10-CM

## 2019-09-20 DIAGNOSIS — I10 HYPERTENSION, UNSPECIFIED TYPE: Primary | ICD-10-CM

## 2019-09-20 DIAGNOSIS — E78.00 HYPERCHOLESTEROLEMIA: ICD-10-CM

## 2019-09-20 PROBLEM — N95.2 VAGINAL ATROPHY: Status: ACTIVE | Noted: 2018-10-22

## 2019-09-20 PROCEDURE — 4040F PNEUMOC VAC/ADMIN/RCVD: CPT | Performed by: FAMILY MEDICINE

## 2019-09-20 PROCEDURE — 99213 OFFICE O/P EST LOW 20 MIN: CPT | Performed by: FAMILY MEDICINE

## 2019-09-20 PROCEDURE — 1101F PT FALLS ASSESS-DOCD LE1/YR: CPT | Performed by: FAMILY MEDICINE

## 2019-09-20 RX ORDER — OMEPRAZOLE 20 MG/1
20 CAPSULE, DELAYED RELEASE ORAL DAILY
COMMUNITY
Start: 2016-03-24 | End: 2019-10-21 | Stop reason: SDUPTHER

## 2019-09-20 NOTE — ASSESSMENT & PLAN NOTE
Chronic  Improving  -Continue with PRN meclizine for symptoms  Avoidance of simultaneous use of meclizine, xanax and robaxin stressed with patient  She reports the last 2 are not use frequently

## 2019-09-20 NOTE — ASSESSMENT & PLAN NOTE
New  Mechanical fall  -1 week ago  -Knee exam benign  Will order R foot x-ray to rule out fracture  -Instructed patient to take tylenol 500 mg TID and continue ice packs for pain    -Follow up results

## 2019-09-20 NOTE — ASSESSMENT & PLAN NOTE
-Patient follows with psychiatry in [facility] (forgot name of provider)  -Recently Xanax decreased from 0 25 TID to BID by psychiatrist   -Patient reports no increase anxiety with new dose

## 2019-09-20 NOTE — ASSESSMENT & PLAN NOTE
Blood Pressure: 140/80   Stable  At goal  -Continue lisinopril-hctz 20-12 5 mg daily  -Microalbumin/cr   Ratio ordered at this visit  -Will follow up in 3 months

## 2019-09-20 NOTE — PROGRESS NOTES
Assessment/Plan:    Hypertension  Blood Pressure: 140/80   Stable  At goal  -Continue lisinopril-hctz 20-12 5 mg daily  -Microalbumin/cr  Ratio ordered at this visit  -Will follow up in 3 months    [facility]  Mechanical fall  -1 week ago  -Knee exam benign  Will order R foot x-ray to rule out fracture  -Instructed patient to take tylenol 500 mg TID and continue ice packs for pain    -Follow up results  Dizziness  Chronic  Improving  -Continue with PRN meclizine for symptoms  Avoidance of simultaneous use of meclizine, xanax and robaxin stressed with patient  She reports the last 2 are not use frequently  Anxiety disorder  -Patient follows with psychiatry in [facility] (forgot name of provider)  -Recently Xanax decreased from 0 25 TID to BID by psychiatrist   -Patient reports no increase anxiety with new dose  Diagnoses and all orders for this visit:    Hypertension, unspecified type  -     Cancel: Microalbumin / creatinine urine ratio  -     Microalbumin / creatinine urine ratio    Spasm of lumbar paraspinous muscle    Dizziness    Fall, initial encounter  -     XR foot 3+ vw right; Future    Hypercholesterolemia  -     Lipid panel; Future    Screening for breast cancer  -     Mammo screening bilateral w cad; Future    Other orders  -     omeprazole (PriLOSEC) 20 mg delayed release capsule; Take 20 mg by mouth daily          Subjective:      Patient ID: Sussy York is a 68 y o  female  Patient is a 80-year-old female with past medical history of aortic wall calcification, hypertension, vertigo, hypercholesterolemia, vitamin-D deficiency, anxiety, depression, urinary incontinence who presents to the office for routine follow-up on hypertension  Patient reports dizziness and back pain has been controlled with meclizine and Robaxin prescribed last visit and she is only taking the p r n  She reports she fell last week when her daughter the prepared as surprise party for her    She reports the lights were off, and she could not see anything when she tripped over a step and she fell to the side  She reports pain in her right knee and leg and right foot after that  She has been using ice packs which have been helping with the pain  She is also due to mammogram but per patient she is avoiding it since the last time they heard the skin under her breast and she had lesion that became infected 2 years ago  She reports colonoscopy is up-to-date and the last time she had 1 was 1 year ago  She is following [facility]  The following portions of the patient's history were reviewed and updated as appropriate: She  has a past medical history of Arthritis, Chest pain, Edema of lower extremity, Esophageal reflux, cardiac cath, and Hypertension    Current Outpatient Medications   Medication Sig Dispense Refill    ALPRAZolam (XANAX) 0 25 mg tablet Take 1 tablet by mouth 2 (two) times a day      atorvastatin (LIPITOR) 10 mg tablet Take 1 tablet (10 mg total) by mouth daily 90 tablet 3    clobetasol (TEMOVATE) 0 05 % cream Apply 1 application topically 2 (two) times a day  0    diltiazem (CARDIZEM CD) 240 mg 24 hr capsule Take 1 capsule (240 mg total) by mouth daily 90 capsule 1    diltiazem (TIAZAC) 240 MG 24 hr capsule Take 1 capsule by mouth daily      DOCQLACE 100 MG capsule take 1 capsule by mouth twice a day 60 capsule 3    ergocalciferol (VITAMIN D2) 50,000 units Take 1 capsule (50,000 Units total) by mouth once a week for 12 days 8 capsule 0    FLUAD 0 5 ML BERNABE inject 0 5 milliliter intramuscularly  0    FLUoxetine (PROzac) 20 mg capsule   0    hydrocortisone 2 5 % cream Apply 1 application topically 2 (two) times a day  0    lisinopril-hydrochlorothiazide (PRINZIDE,ZESTORETIC) 20-12 5 MG per tablet take 1 tablet by mouth once daily 90 tablet 4    meclizine (ANTIVERT) 12 5 MG tablet Take 1 tablet (12 5 mg total) by mouth every 8 (eight) hours as needed for dizziness 30 tablet 0    methocarbamol (ROBAXIN) 500 mg tablet Take 1 tablet (500 mg total) by mouth every 8 (eight) hours 90 tablet 1    naproxen (NAPROSYN) 500 mg tablet take 1 tablet by mouth every 12 hours with food if needed      omeprazole (PriLOSEC) 20 mg delayed release capsule Take 20 mg by mouth daily      TOVIAZ 8 MG TB24   0     No current facility-administered medications for this visit  She has No Known Allergies       Review of Systems   Constitutional: Negative for appetite change, diaphoresis, fatigue and fever  HENT: Negative for congestion  Eyes: Negative for visual disturbance  Respiratory: Negative for cough, chest tightness and shortness of breath  Cardiovascular: Negative for chest pain, palpitations and leg swelling  Gastrointestinal: Negative for abdominal distention, abdominal pain, constipation, diarrhea, nausea and vomiting  Genitourinary: Negative for difficulty urinating, frequency and urgency  Musculoskeletal: Negative for back pain and neck pain  R Knee and R foot pain     Neurological: Negative for weakness, light-headedness, numbness and headaches  Psychiatric/Behavioral: Negative for agitation and behavioral problems  The patient is not nervous/anxious  Objective:      /80 (BP Location: Right arm, Patient Position: Sitting, Cuff Size: Standard)   Pulse 72   Temp 98 4 °F (36 9 °C) (Tympanic)   Resp 16   Ht 5' 4" (1 626 m)   Wt 67 9 kg (149 lb 12 8 oz)   BMI 25 71 kg/m²          Physical Exam   Constitutional: She is oriented to person, place, and time  She appears well-developed and well-nourished  No distress  HENT:   Head: Normocephalic and atraumatic  Eyes: Pupils are equal, round, and reactive to light  EOM are normal    Neck: Normal range of motion  Neck supple  Cardiovascular: Normal rate, regular rhythm, normal heart sounds and intact distal pulses  Exam reveals no gallop and no friction rub  No murmur heard    Pulmonary/Chest: Effort normal and breath sounds normal  No respiratory distress  She has no wheezes  She has no rales  She exhibits no tenderness  Abdominal: Soft  Bowel sounds are normal  She exhibits no distension and no mass  There is no tenderness  There is no rebound and no guarding  Musculoskeletal: Normal range of motion  She exhibits no edema or deformity  Right knee: She exhibits normal range of motion, no swelling, no effusion, no LCL laxity, normal patellar mobility and no MCL laxity  Tenderness (To palpation in anterior knee) found  No MCL and no LCL tenderness noted  Right foot: There is normal range of motion and no deformity  Left foot: There is normal range of motion and no deformity  Feet:    Neurological: She is alert and oriented to person, place, and time  Skin: Skin is warm and dry  No rash noted  She is not diaphoretic  No erythema  Psychiatric: She has a normal mood and affect  Her behavior is normal  Judgment and thought content normal    Vitals reviewed

## 2019-09-24 ENCOUNTER — TRANSCRIBE ORDERS (OUTPATIENT)
Dept: RADIOLOGY | Facility: HOSPITAL | Age: 74
End: 2019-09-24

## 2019-09-24 ENCOUNTER — HOSPITAL ENCOUNTER (OUTPATIENT)
Dept: RADIOLOGY | Facility: HOSPITAL | Age: 74
Discharge: HOME | End: 2019-09-24
Payer: COMMERCIAL
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
## 2019-09-24 PROCEDURE — 73630 X-RAY EXAM OF FOOT: CPT

## 2019-09-30 DIAGNOSIS — K21.9 GASTROESOPHAGEAL REFLUX DISEASE WITHOUT ESOPHAGITIS: ICD-10-CM

## 2019-09-30 RX ORDER — OMEPRAZOLE 20 MG/1
20 CAPSULE, DELAYED RELEASE ORAL DAILY
Qty: 90 CAPSULE | Refills: 1 | OUTPATIENT
Start: 2019-09-30

## 2019-10-01 ENCOUNTER — APPOINTMENT (OUTPATIENT)
Dept: LAB | Facility: HOSPITAL | Age: 74
End: 2019-10-01
Payer: COMMERCIAL

## 2019-10-01 DIAGNOSIS — E78.00 HYPERCHOLESTEROLEMIA: ICD-10-CM

## 2019-10-01 LAB
CHOLEST SERPL-MCNC: 205 MG/DL (ref 50–200)
CREAT UR-MCNC: 62.1 MG/DL
HDLC SERPL-MCNC: 51 MG/DL (ref 40–60)
LDLC SERPL CALC-MCNC: 118 MG/DL (ref 0–100)
MICROALBUMIN UR-MCNC: 7.5 MG/L (ref 0–20)
MICROALBUMIN/CREAT 24H UR: 12 MG/G CREATININE (ref 0–30)
NONHDLC SERPL-MCNC: 154 MG/DL
TRIGL SERPL-MCNC: 181 MG/DL

## 2019-10-01 PROCEDURE — 36415 COLL VENOUS BLD VENIPUNCTURE: CPT

## 2019-10-01 PROCEDURE — 82043 UR ALBUMIN QUANTITATIVE: CPT | Performed by: FAMILY MEDICINE

## 2019-10-01 PROCEDURE — 80061 LIPID PANEL: CPT

## 2019-10-01 PROCEDURE — 82570 ASSAY OF URINE CREATININE: CPT | Performed by: FAMILY MEDICINE

## 2019-10-08 ENCOUNTER — TELEPHONE (OUTPATIENT)
Dept: FAMILY MEDICINE CLINIC | Facility: CLINIC | Age: 74
End: 2019-10-08

## 2019-10-08 NOTE — TELEPHONE ENCOUNTER
Patient called requesting a call back regarding lab work done on 10/01 and X-ray report of the foot done on 09/24  Thanks

## 2019-10-18 ENCOUNTER — TELEPHONE (OUTPATIENT)
Dept: FAMILY MEDICINE CLINIC | Facility: CLINIC | Age: 74
End: 2019-10-18

## 2019-10-20 DIAGNOSIS — K21.9 GASTROESOPHAGEAL REFLUX DISEASE WITHOUT ESOPHAGITIS: ICD-10-CM

## 2019-10-21 DIAGNOSIS — K21.9 GASTROESOPHAGEAL REFLUX DISEASE, ESOPHAGITIS PRESENCE NOT SPECIFIED: Primary | ICD-10-CM

## 2019-10-22 RX ORDER — OMEPRAZOLE 20 MG/1
20 CAPSULE, DELAYED RELEASE ORAL DAILY
Qty: 90 CAPSULE | Refills: 0 | Status: SHIPPED | OUTPATIENT
Start: 2019-10-22 | End: 2020-01-14

## 2019-10-22 RX ORDER — OMEPRAZOLE 20 MG/1
20 CAPSULE, DELAYED RELEASE ORAL DAILY
Qty: 90 CAPSULE | Refills: 1 | OUTPATIENT
Start: 2019-10-22

## 2019-11-12 ENCOUNTER — APPOINTMENT (OUTPATIENT)
Dept: RADIOLOGY | Age: 74
End: 2019-11-12
Attending: FAMILY MEDICINE
Payer: COMMERCIAL

## 2019-11-12 ENCOUNTER — OFFICE VISIT (OUTPATIENT)
Dept: URGENT CARE | Age: 74
End: 2019-11-12
Payer: COMMERCIAL

## 2019-11-12 VITALS
HEART RATE: 70 BPM | TEMPERATURE: 98 F | OXYGEN SATURATION: 96 % | DIASTOLIC BLOOD PRESSURE: 67 MMHG | BODY MASS INDEX: 26.75 KG/M2 | SYSTOLIC BLOOD PRESSURE: 145 MMHG | WEIGHT: 151 LBS | HEIGHT: 63 IN

## 2019-11-12 DIAGNOSIS — M25.561 ACUTE PAIN OF RIGHT KNEE: ICD-10-CM

## 2019-11-12 DIAGNOSIS — M25.561 ACUTE PAIN OF RIGHT KNEE: Primary | ICD-10-CM

## 2019-11-12 PROCEDURE — 73564 X-RAY EXAM KNEE 4 OR MORE: CPT

## 2019-11-12 PROCEDURE — 99213 OFFICE O/P EST LOW 20 MIN: CPT | Performed by: FAMILY MEDICINE

## 2019-11-12 RX ORDER — NAPROXEN 500 MG/1
500 TABLET ORAL 2 TIMES DAILY WITH MEALS
Qty: 30 TABLET | Refills: 0 | Status: SHIPPED | OUTPATIENT
Start: 2019-11-12 | End: 2020-01-10

## 2019-11-12 NOTE — PATIENT INSTRUCTIONS
Rest, elevate right leg, limit activity  Ice to the right knee 2 to 3 times a day for 15-20 minutes  Use Ace wrap while awake  Use cane to assist ambulation  Naprosyn twice a day for pain and inflammation (please take with food)  Recheck/follow-up with family physician or orthopedics ([phone]) as discussed  Please go to the hospital emergency department if needed

## 2019-11-12 NOTE — PROGRESS NOTES
[facility]        NAME: Ari Workman is a 76 y o  female  : 1945    MRN: [id number]  DATE: 2019  TIME: 12:39 PM    Assessment and Plan   Acute pain of right knee [M25 561]  1  Acute pain of right knee  XR knee 4+ vw right injury    naproxen (NAPROSYN) 500 mg tablet    Compression bandages    Cane         Patient Instructions     Patient Instructions   Rest, elevate right leg, limit activity  Ice to the right knee 2 to 3 times a day for 15-20 minutes  Use Ace wrap while awake  Use cane to assist ambulation  Naprosyn twice a day for pain and inflammation (please take with food)  Recheck/follow-up with family physician or orthopedics ([phone]) as discussed  Please go to the hospital emergency department if needed  Follow up with PCP in 3-5 days  Proceed to  ER if symptoms worsen      Chief Complaint     Chief Complaint   Patient presents with    Knee Pain     right knee pain x 1 week after falling          History of Present Illness       Right knee pain after falling 1 week ago      Review of Systems   Review of Systems   Musculoskeletal:        Right knee pain         Current Medications       Current Outpatient Medications:     ALPRAZolam (XANAX) 0 25 mg tablet, Take 1 tablet by mouth 2 (two) times a day, Disp: , Rfl:     atorvastatin (LIPITOR) 10 mg tablet, Take 1 tablet (10 mg total) by mouth daily, Disp: 90 tablet, Rfl: 3    clobetasol (TEMOVATE) 0 05 % cream, Apply 1 application topically 2 (two) times a day, Disp: , Rfl: 0    diltiazem (CARDIZEM CD) 240 mg 24 hr capsule, Take 1 capsule (240 mg total) by mouth daily (Patient not taking: Reported on 2019), Disp: 90 capsule, Rfl: 1    diltiazem (TIAZAC) 240 MG 24 hr capsule, Take 1 capsule by mouth daily, Disp: , Rfl:     DOCQLACE 100 MG capsule, take 1 capsule by mouth twice a day, Disp: 60 capsule, Rfl: 3    ergocalciferol (VITAMIN D2) 50,000 units, Take 1 capsule (50,000 Units total) by mouth once a week for 12 days, Disp: 8 capsule, Rfl: 0    FLUAD 0 5 ML BERNABE, inject 0 5 milliliter intramuscularly, Disp: , Rfl: 0    FLUoxetine (PROzac) 20 mg capsule, , Disp: , Rfl: 0    hydrocortisone 2 5 % cream, Apply 1 application topically 2 (two) times a day, Disp: , Rfl: 0    lisinopril-hydrochlorothiazide (PRINZIDE,ZESTORETIC) 20-12 5 MG per tablet, take 1 tablet by mouth once daily, Disp: 90 tablet, Rfl: 4    meclizine (ANTIVERT) 12 5 MG tablet, Take 1 tablet (12 5 mg total) by mouth every 8 (eight) hours as needed for dizziness (Patient not taking: Reported on 11/12/2019), Disp: 30 tablet, Rfl: 0    methocarbamol (ROBAXIN) 500 mg tablet, Take 1 tablet (500 mg total) by mouth every 8 (eight) hours (Patient not taking: Reported on 11/12/2019), Disp: 90 tablet, Rfl: 1    naproxen (NAPROSYN) 500 mg tablet, take 1 tablet by mouth every 12 hours with food if needed, Disp: , Rfl:     naproxen (NAPROSYN) 500 mg tablet, Take 1 tablet (500 mg total) by mouth 2 (two) times a day with meals for 30 doses, Disp: 30 tablet, Rfl: 0    omeprazole (PriLOSEC) 20 mg delayed release capsule, Take 1 capsule (20 mg total) by mouth daily, Disp: 90 capsule, Rfl: 0    TOVIAZ 8 MG TB24, , Disp: , Rfl: 0    Current Allergies     Allergies as of 11/12/2019    (No Known Allergies)            The following portions of the patient's history were reviewed and updated as appropriate: allergies, current medications, past family history, past medical history, past social history, past surgical history and problem list      Past Medical History:   Diagnosis Date    Arthritis     Chest pain     Edema of lower extremity     Esophageal reflux     Hx of cardiac cath     Hypertension        Past Surgical History:   Procedure Laterality Date    BLADDER SURGERY         Family History   Problem Relation Age of Onset    COPD Mother     Emphysema Mother          Medications have been verified          Objective   /67   Pulse 70 Temp 98 °F (36 7 °C) (Temporal)   Ht 5' 3" (1 6 m)   Wt 68 5 kg (151 lb)   SpO2 96%   BMI 26 75 kg/m²        Physical Exam     Physical Exam   Constitutional: She is oriented to person, place, and time  She appears well-developed and well-nourished  Neurological: She is alert and oriented to person, place, and time  Generalized pain and swelling of the right knee; patient able to flex and extend her right knee   Skin:   No bruising noted   Psychiatric: She has a normal mood and affect   Her behavior is normal          Right knee x-rays - no fracture noted      Ace wrap applied to patient's right knee by the with the nurse    Micah Latin and cane training given to the patient by the nurse

## 2019-11-15 ENCOUNTER — OFFICE VISIT (OUTPATIENT)
Dept: OBGYN CLINIC | Facility: CLINIC | Age: 74
End: 2019-11-15
Payer: COMMERCIAL

## 2019-11-15 VITALS
BODY MASS INDEX: 25.52 KG/M2 | DIASTOLIC BLOOD PRESSURE: 78 MMHG | HEART RATE: 75 BPM | WEIGHT: 144 LBS | SYSTOLIC BLOOD PRESSURE: 147 MMHG | HEIGHT: 63 IN

## 2019-11-15 DIAGNOSIS — M17.11 PRIMARY OSTEOARTHRITIS OF RIGHT KNEE: Primary | ICD-10-CM

## 2019-11-15 PROCEDURE — 20610 DRAIN/INJ JOINT/BURSA W/O US: CPT | Performed by: ORTHOPAEDIC SURGERY

## 2019-11-15 PROCEDURE — 99203 OFFICE O/P NEW LOW 30 MIN: CPT | Performed by: ORTHOPAEDIC SURGERY

## 2019-11-15 RX ORDER — METHYLPREDNISOLONE ACETATE 40 MG/ML
1 INJECTION, SUSPENSION INTRA-ARTICULAR; INTRALESIONAL; INTRAMUSCULAR; SOFT TISSUE
Status: COMPLETED | OUTPATIENT
Start: 2019-11-15 | End: 2019-11-15

## 2019-11-15 RX ORDER — LIDOCAINE HYDROCHLORIDE 10 MG/ML
4 INJECTION, SOLUTION INFILTRATION; PERINEURAL
Status: COMPLETED | OUTPATIENT
Start: 2019-11-15 | End: 2019-11-15

## 2019-11-15 RX ADMIN — LIDOCAINE HYDROCHLORIDE 4 ML: 10 INJECTION, SOLUTION INFILTRATION; PERINEURAL at 07:25

## 2019-11-15 RX ADMIN — METHYLPREDNISOLONE ACETATE 1 ML: 40 INJECTION, SUSPENSION INTRA-ARTICULAR; INTRALESIONAL; INTRAMUSCULAR; SOFT TISSUE at 07:25

## 2019-11-15 NOTE — PROGRESS NOTES
Patient Name:  Angeles Vargas  MRN:  [id number]    Assessment & Plan     Right Knee DJD, recent flare-up after fall  1  Steroid injection performed today into the right knee  2  Continue Tylenol as needed  3  Activities as tolerated  4  Follow-up as needed  Briefly discussed MRI if pain persists  Chief Complaint     Right Knee Pain    History of the Present Illness     Angeles Vargas is a 76 y o  female who reports to the office today for evaluation of her right knee  Patient was seen on 11/12/19 at Urgent Care after sustaining a fall approximately one week ago  Patient states she fell directly onto her right knee while walking down steps  Since then she notes pain localized to the medial aspect of the knee  She notes radiation distally and proximally  Pain is worse with bearing weight and ambulating  She notes stiffness in the morning  She denies any significant swelling  She does note weakness secondary to pain  No instability  No numbness or tingling  She was prescribed naproxen at the urgent care  She states she took half a pill experienced GI upset from this  She has also been taking Tylenol which provides no relief  No fevers or chills  Physical Exam     /78   Pulse 75   Ht 5' 3" (1 6 m)   Wt 65 3 kg (144 lb)   BMI 25 51 kg/m²     Right Knee:  No gross deformity  Skin intact  No erythema ecchymosis or swelling  No effusion  Tenderness to palpation medial aspect of the knee  No tenderness to palpation lateral joint line  Range of motion includes full extension and flexion beyond 120°  Stable to varus and valgus stress  Stable Lachman test   Negative Ezio's test   Sensation intact right lower extremity  Skin warm and well perfused  Eyes: Anicteric sclerae  Neck: Supple  Lungs: Unlabored breathing  Cardiovascular: Capillary refill is less than 2 seconds  Skin: Intact without erythema  Neurologic: Sensation intact to light touch    Psychiatric: Mood and affect are appropriate  Data Review     I have personally reviewed pertinent films in PACS, and my interpretation follows:    X-rays right knee 11/12/19 reveals no acute osseous abnormalities  No fractures noted  Degenerative changes noted within the medial compartment      Large joint arthrocentesis: R knee  Date/Time: 11/15/2019 7:25 AM  Procedure Details  Location: knee - R knee  Needle size: 22 G  Ultrasound guidance: no  Approach: anterolateral  Medications administered: 4 mL lidocaine 1 %; 1 mL methylPREDNISolone acetate 40 mg/mL    Patient tolerance: patient tolerated the procedure well with no immediate complications  Dressing:  Sterile dressing applied              Past Medical History:   Diagnosis Date    Arthritis     Chest pain     Edema of lower extremity     Esophageal reflux     Hx of cardiac cath     Hypertension        Past Surgical History:   Procedure Laterality Date    BLADDER SURGERY         No Known Allergies    Current Outpatient Medications on File Prior to Visit   Medication Sig Dispense Refill    ALPRAZolam (XANAX) 0 25 mg tablet Take 1 tablet by mouth 2 (two) times a day      atorvastatin (LIPITOR) 10 mg tablet Take 1 tablet (10 mg total) by mouth daily 90 tablet 3    clobetasol (TEMOVATE) 0 05 % cream Apply 1 application topically 2 (two) times a day  0    diltiazem (CARDIZEM CD) 240 mg 24 hr capsule Take 1 capsule (240 mg total) by mouth daily (Patient not taking: Reported on 11/12/2019) 90 capsule 1    diltiazem (TIAZAC) 240 MG 24 hr capsule Take 1 capsule by mouth daily      DOCQLACE 100 MG capsule take 1 capsule by mouth twice a day 60 capsule 3    ergocalciferol (VITAMIN D2) 50,000 units Take 1 capsule (50,000 Units total) by mouth once a week for 12 days 8 capsule 0    FLUAD 0 5 ML BERNABE inject 0 5 milliliter intramuscularly  0    FLUoxetine (PROzac) 20 mg capsule   0    hydrocortisone 2 5 % cream Apply 1 application topically 2 (two) times a day  0  lisinopril-hydrochlorothiazide (PRINZIDE,ZESTORETIC) 20-12 5 MG per tablet take 1 tablet by mouth once daily 90 tablet 4    meclizine (ANTIVERT) 12 5 MG tablet Take 1 tablet (12 5 mg total) by mouth every 8 (eight) hours as needed for dizziness (Patient not taking: Reported on 11/12/2019) 30 tablet 0    methocarbamol (ROBAXIN) 500 mg tablet Take 1 tablet (500 mg total) by mouth every 8 (eight) hours (Patient not taking: Reported on 11/12/2019) 90 tablet 1    naproxen (NAPROSYN) 500 mg tablet take 1 tablet by mouth every 12 hours with food if needed      naproxen (NAPROSYN) 500 mg tablet Take 1 tablet (500 mg total) by mouth 2 (two) times a day with meals for 30 doses 30 tablet 0    omeprazole (PriLOSEC) 20 mg delayed release capsule Take 1 capsule (20 mg total) by mouth daily 90 capsule 0    TOVIAZ 8 MG TB24   0     No current facility-administered medications on file prior to visit  Social History     Tobacco Use    Smoking status: Never Smoker    Smokeless tobacco: Never Used   Substance Use Topics    Alcohol use: No    Drug use: Never       Family History   Problem Relation Age of Onset    COPD Mother     Emphysema Mother        Review of Systems     As stated in the HPI  All other systems were reviewed and are negative        Scribe Attestation    I,:   Марина Russo PA-C am acting as a scribe while in the presence of the attending physician :        I,:   Stefan Bhagat MD personally performed the services described in this documentation    as scribed in my presence :

## 2019-11-21 DIAGNOSIS — I10 HYPERTENSION, UNSPECIFIED TYPE: ICD-10-CM

## 2019-11-21 RX ORDER — DILTIAZEM HYDROCHLORIDE 240 MG/1
CAPSULE, EXTENDED RELEASE ORAL
Qty: 90 CAPSULE | Refills: 1 | Status: SHIPPED | OUTPATIENT
Start: 2019-11-21 | End: 2020-01-13 | Stop reason: SDUPTHER

## 2020-01-10 ENCOUNTER — OFFICE VISIT (OUTPATIENT)
Dept: FAMILY MEDICINE CLINIC | Facility: CLINIC | Age: 75
End: 2020-01-10

## 2020-01-10 VITALS
RESPIRATION RATE: 14 BRPM | SYSTOLIC BLOOD PRESSURE: 147 MMHG | TEMPERATURE: 97.1 F | DIASTOLIC BLOOD PRESSURE: 67 MMHG | HEIGHT: 63 IN | WEIGHT: 151.4 LBS | BODY MASS INDEX: 26.82 KG/M2

## 2020-01-10 DIAGNOSIS — I10 HYPERTENSION, UNSPECIFIED TYPE: ICD-10-CM

## 2020-01-10 DIAGNOSIS — R60.0 BILATERAL LEG EDEMA: Primary | ICD-10-CM

## 2020-01-10 PROCEDURE — 99213 OFFICE O/P EST LOW 20 MIN: CPT | Performed by: FAMILY MEDICINE

## 2020-01-10 PROCEDURE — 1160F RVW MEDS BY RX/DR IN RCRD: CPT | Performed by: FAMILY MEDICINE

## 2020-01-10 RX ORDER — FUROSEMIDE 20 MG/1
20 TABLET ORAL DAILY
Qty: 7 TABLET | Refills: 0 | Status: SHIPPED | OUTPATIENT
Start: 2020-01-10 | End: 2021-03-16

## 2020-01-10 NOTE — PROGRESS NOTES
Assessment/Plan:    Bilateral leg edema  Acute  -Could be secondary to venous insufficiency  Patient doesn't look fluid overloaded  -No alarm signs on physical exam  Negative Homans sign  -Last echo on 52/08/460 showed Systolic function was normal  Ejection fraction was estimated to be 60 %  Wall thickness was normal  Grade 1 diastolic dysfunction  -Lasix 20 mg daily for 7 days prescribed  -Encouraged patient to do leg elevation and compression stockings  -RTC in 7 days    Hypertension  Blood Pressure: 147/67   BP at goal  -Encouraged medication compliance  -Advised patient on symptoms of hypotension & severe HTN  -Limit salt-intake & caffeine in diet, minimize stress level  -Weight reduction efforts via improved diet & increased exercise recommend 150 minutes a week  -DASH diet handout given via AVS  -last urine microalbumin than 1 year ago  -Discussed importance of treating HTN to prevent ASCVD, CVA               Problem List Items Addressed This Visit        Cardiovascular and Mediastinum    Hypertension     Blood Pressure: 147/67   BP at goal  -Encouraged medication compliance  -Advised patient on symptoms of hypotension & severe HTN  -Limit salt-intake & caffeine in diet, minimize stress level  -Weight reduction efforts via improved diet & increased exercise recommend 150 minutes a week  -DASH diet handout given via AVS  -last urine microalbumin than 1 year ago  -Discussed importance of treating HTN to prevent ASCVD, CVA               Relevant Medications    furosemide (LASIX) 20 mg tablet       Other    Bilateral leg edema - Primary     Acute  -Could be secondary to venous insufficiency  Patient doesn't look fluid overloaded  -No alarm signs on physical exam  Negative Homans sign  -Last echo on 93/16/008 showed Systolic function was normal  Ejection fraction was estimated to be 60 %    Wall thickness was normal  Grade 1 diastolic dysfunction  -Lasix 20 mg daily for 7 days prescribed  -Encouraged patient to do leg elevation and compression stockings  -RTC in 7 days         Relevant Medications    furosemide (LASIX) 20 mg tablet            Subjective:      Patient ID: Jerry Luis is a 76 y o  female  HPI     Patient is a 17-year-old female with past medical history of hypertension, hypercholesterolemia, mitral regurgitation, depression, anxiety who presents to the office for worsening bilateral leg swelling for the past month  Patient reports that she notice legs looked more tense and also she is complaining of pain especially in the right feet, add decreases her ability to walk  Patient had a fall and was seen in urgent care on 2019 after a knee injury for 1 week  R knee Xray showed OA,, R knee medial joint compartment  No acute osseus abnormality and has been following orthopedic surgery for knee injections  She denies SOB, chest pain, palpitations, abdominal pain, N/V/D  She also reports she has been dealing with a lot of grief since her son  and she found him in the apartment  She is sad but doesn't feel hopeless or has lost interest in things she likes  The following portions of the patient's history were reviewed and updated as appropriate:   She  has a past medical history of Arthritis, Chest pain, Edema of lower extremity, Esophageal reflux, cardiac cath, and Hypertension  She  has a past surgical history that includes Bladder surgery    Current Outpatient Medications   Medication Sig Dispense Refill    ALPRAZolam (XANAX) 0 25 mg tablet Take 1 tablet by mouth 2 (two) times a day      atorvastatin (LIPITOR) 10 mg tablet Take 1 tablet (10 mg total) by mouth daily 90 tablet 3    DILT- MG 24 hr capsule TAKE 1 CAPSULE (240 MG TOTAL) BY MOUTH DAILY 90 capsule 1    DOCQLACE 100 MG capsule take 1 capsule by mouth twice a day 60 capsule 3    FLUAD 0 5 ML BERNABE inject 0 5 milliliter intramuscularly  0    FLUoxetine (PROzac) 20 mg capsule   0    lisinopril-hydrochlorothiazide (PRINZIDE,ZESTORETIC) 20-12 5 MG per tablet take 1 tablet by mouth once daily 90 tablet 4    omeprazole (PriLOSEC) 20 mg delayed release capsule Take 1 capsule (20 mg total) by mouth daily 90 capsule 0    TOVIAZ 8 MG TB24   0    clobetasol (TEMOVATE) 0 05 % cream Apply 1 application topically 2 (two) times a day  0    ergocalciferol (VITAMIN D2) 50,000 units Take 1 capsule (50,000 Units total) by mouth once a week for 12 days 8 capsule 0    furosemide (LASIX) 20 mg tablet Take 1 tablet (20 mg total) by mouth daily 7 tablet 0    hydrocortisone 2 5 % cream Apply 1 application topically 2 (two) times a day  0    meclizine (ANTIVERT) 12 5 MG tablet Take 1 tablet (12 5 mg total) by mouth every 8 (eight) hours as needed for dizziness (Patient not taking: Reported on 11/12/2019) 30 tablet 0    methocarbamol (ROBAXIN) 500 mg tablet Take 1 tablet (500 mg total) by mouth every 8 (eight) hours (Patient not taking: Reported on 11/12/2019) 90 tablet 1     No current facility-administered medications for this visit        Current Outpatient Medications on File Prior to Visit   Medication Sig    ALPRAZolam (XANAX) 0 25 mg tablet Take 1 tablet by mouth 2 (two) times a day    atorvastatin (LIPITOR) 10 mg tablet Take 1 tablet (10 mg total) by mouth daily    DILT- MG 24 hr capsule TAKE 1 CAPSULE (240 MG TOTAL) BY MOUTH DAILY    DOCQLACE 100 MG capsule take 1 capsule by mouth twice a day    FLUAD 0 5 ML BERNABE inject 0 5 milliliter intramuscularly    FLUoxetine (PROzac) 20 mg capsule     lisinopril-hydrochlorothiazide (PRINZIDE,ZESTORETIC) 20-12 5 MG per tablet take 1 tablet by mouth once daily    omeprazole (PriLOSEC) 20 mg delayed release capsule Take 1 capsule (20 mg total) by mouth daily    TOVIAZ 8 MG TB24     clobetasol (TEMOVATE) 0 05 % cream Apply 1 application topically 2 (two) times a day    ergocalciferol (VITAMIN D2) 50,000 units Take 1 capsule (50,000 Units total) by mouth once a week for 12 days    hydrocortisone 2 5 % cream Apply 1 application topically 2 (two) times a day    meclizine (ANTIVERT) 12 5 MG tablet Take 1 tablet (12 5 mg total) by mouth every 8 (eight) hours as needed for dizziness (Patient not taking: Reported on 11/12/2019)    methocarbamol (ROBAXIN) 500 mg tablet Take 1 tablet (500 mg total) by mouth every 8 (eight) hours (Patient not taking: Reported on 11/12/2019)     No current facility-administered medications on file prior to visit       Review of Systems   Constitutional: Negative for appetite change, diaphoresis, fatigue and fever  HENT: Negative for congestion, rhinorrhea and sinus pain  Eyes: Negative for visual disturbance  Respiratory: Negative for cough, chest tightness and shortness of breath  Cardiovascular: Positive for leg swelling  Negative for chest pain and palpitations  Gastrointestinal: Negative for abdominal distention, abdominal pain, constipation, diarrhea, nausea and vomiting  Genitourinary: Negative for difficulty urinating, frequency and urgency  Musculoskeletal: Negative for back pain and neck pain  R knee pain   Neurological: Positive for light-headedness  Negative for weakness, numbness and headaches  Psychiatric/Behavioral: Negative for agitation and behavioral problems  The patient is not nervous/anxious  Objective:      /67 (BP Location: Left arm, Patient Position: Sitting, Cuff Size: Standard)   Temp (!) 97 1 °F (36 2 °C) (Tympanic)   Resp 14   Ht 5' 3" (1 6 m)   Wt 68 7 kg (151 lb 6 4 oz)   BMI 26 82 kg/m²          Physical Exam   Constitutional: She is oriented to person, place, and time  She appears well-developed and well-nourished  No distress  HENT:   Head: Normocephalic and atraumatic  Neck: Normal range of motion  Neck supple  No thyromegaly present  Cardiovascular: Normal rate, regular rhythm, normal heart sounds and intact distal pulses  Exam reveals no gallop and no friction rub     No murmur heard   Pulmonary/Chest: Effort normal and breath sounds normal  No respiratory distress  She has no wheezes  She has no rales  She exhibits no tenderness  Abdominal: Soft  Bowel sounds are normal  She exhibits no distension and no mass  There is no tenderness  There is no rebound and no guarding  Musculoskeletal: Normal range of motion  She exhibits edema (1+ pitting edema in the ankles)  She exhibits no tenderness or deformity  Negative Homans sign  Bilateral    Lymphadenopathy:     She has no cervical adenopathy  Neurological: She is alert and oriented to person, place, and time  Skin: Skin is warm and dry  No rash noted  She is not diaphoretic  No erythema  Psychiatric: She has a normal mood and affect  Her behavior is normal  Judgment and thought content normal    Vitals reviewed

## 2020-01-11 NOTE — ASSESSMENT & PLAN NOTE
Blood Pressure: 147/67   BP at goal  -Encouraged medication compliance  -Advised patient on symptoms of hypotension & severe HTN  -Limit salt-intake & caffeine in diet, minimize stress level  -Weight reduction efforts via improved diet & increased exercise recommend 150 minutes a week  -DASH diet handout given via AVS  -last urine microalbumin than 1 year ago  -Discussed importance of treating HTN to prevent ASCVD, CVA

## 2020-01-11 NOTE — ASSESSMENT & PLAN NOTE
Acute  -Could be secondary to venous insufficiency  Patient doesn't look fluid overloaded  -No alarm signs on physical exam  Negative Homans sign  -Last echo on 17/01/207 showed Systolic function was normal  Ejection fraction was estimated to be 60 %    Wall thickness was normal  Grade 1 diastolic dysfunction  -Lasix 20 mg daily for 7 days prescribed  -Encouraged patient to do leg elevation and compression stockings  -RTC in 7 days

## 2020-01-13 ENCOUNTER — TELEPHONE (OUTPATIENT)
Dept: FAMILY MEDICINE CLINIC | Facility: CLINIC | Age: 75
End: 2020-01-13

## 2020-01-13 DIAGNOSIS — I10 HYPERTENSION, UNSPECIFIED TYPE: ICD-10-CM

## 2020-01-13 RX ORDER — DILTIAZEM HYDROCHLORIDE 240 MG/1
240 CAPSULE, EXTENDED RELEASE ORAL DAILY
Qty: 90 CAPSULE | Refills: 1 | Status: SHIPPED | OUTPATIENT
Start: 2020-01-13 | End: 2020-04-02 | Stop reason: SDUPTHER

## 2020-01-14 DIAGNOSIS — K21.9 GASTROESOPHAGEAL REFLUX DISEASE, ESOPHAGITIS PRESENCE NOT SPECIFIED: ICD-10-CM

## 2020-01-14 RX ORDER — OMEPRAZOLE 20 MG/1
CAPSULE, DELAYED RELEASE ORAL
Qty: 30 CAPSULE | Refills: 0 | Status: ON HOLD | OUTPATIENT
Start: 2020-01-14 | End: 2020-04-09 | Stop reason: SDUPTHER

## 2020-01-30 NOTE — PROGRESS NOTES
Cardiology Follow Up    Radha Honeycutt  1945  [id number]  [address]  [phone] [place] [address]    1  Essential (primary) hypertension  POCT ECG    Echo complete with contrast if indicated   2  Pure hypercholesterolemia  POCT ECG    Echo complete with contrast if indicated   3  Aortic valve disease  POCT ECG    Echo complete with contrast if indicated       Interval History: Patient is here for a follow-up visit  Her most recent echocardiogram done in January 2019 demonstrated preserved LV systolic function with mild mitral annular calcification and mild aortic valve stenosis  The peak continuous wave velocity across the aortic valve was 2 04 m/sec  There was no change compared to a prior study done August 2017  She had a pharmacologic nuclear stress test done February 2014 which demonstrated no evidence of prognostically important ischemia  Patient's vital signs today are stable  She has no chest pain or significant dyspnea  Patient's EKG today demonstrates sinus rhythm with a left bundle branch block with no significant change compared to a prior study done June 14, 2019  Unfortunately her son passed away recently and I did passed a long my sympathy      Patient Active Problem List   Diagnosis    Aortic valve calcification    Cerumen impaction    Dizziness    Hypercholesterolemia    Hypertension    Vitamin D deficiency    Spasm of lumbar paraspinous muscle    Dysuria    Urinary frequency    Suprapubic tenderness    Fall    Anxiety disorder    Depression    Heart murmur    Mixed incontinence    Other specified disorders of rotator cuff syndrome of shoulder and allied disorders    Segmental and somatic dysfunction    Contact dermatitis    Vaginal atrophy    Low back pain    Chronic right SI joint pain    Bilateral leg edema     Past Medical History:   Diagnosis Date    Arthritis     Chest pain     Edema of lower extremity     Esophageal reflux     Hx of cardiac cath     Hypertension      Social History     Socioeconomic History    Marital status: Single     Spouse name: Not on file    Number of children: Not on file    Years of education: Not on file    Highest education level: Not on file   Occupational History    Not on file   Social Needs    Financial resource strain: Not on file    Food insecurity:     Worry: Not on file     Inability: Not on file    Transportation needs:     Medical: Not on file     Non-medical: Not on file   Tobacco Use    Smoking status: Never Smoker    Smokeless tobacco: Never Used   Substance and Sexual Activity    Alcohol use: No    Drug use: Never    Sexual activity: Not on file   Lifestyle    Physical activity:     Days per week: Not on file     Minutes per session: Not on file    Stress: Not on file   Relationships    Social connections:     Talks on phone: Not on file     Gets together: Not on file     Attends Christian service: Not on file     Active member of club or organization: Not on file     Attends meetings of clubs or organizations: Not on file     Relationship status: Not on file    Intimate partner violence:     Fear of current or ex partner: Not on file     Emotionally abused: Not on file     Physically abused: Not on file     Forced sexual activity: Not on file   Other Topics Concern    Not on file   Social History Narrative    Caffeine use    Tea use      Family History   Problem Relation Age of Onset    COPD Mother     Emphysema Mother      Past Surgical History:   Procedure Laterality Date    BLADDER SURGERY         Current Outpatient Medications:     ALPRAZolam (XANAX) 0 25 mg tablet, Take 1 tablet by mouth 2 (two) times a day, Disp: , Rfl:     atorvastatin (LIPITOR) 10 mg tablet, Take 1 tablet (10 mg total) by mouth daily, Disp: 90 tablet, Rfl: 3    clobetasol (TEMOVATE) 0 05 % cream, Apply 1 application topically 2 (two) times a day, Disp: , Rfl: 0    diltiazem (DILT-XR) 240 MG 24 hr capsule, Take 1 capsule (240 mg total) by mouth daily, Disp: 90 capsule, Rfl: 1    DOCQLACE 100 MG capsule, take 1 capsule by mouth twice a day, Disp: 60 capsule, Rfl: 3    FLUAD 0 5 ML BERNABE, inject 0 5 milliliter intramuscularly, Disp: , Rfl: 0    FLUoxetine (PROzac) 20 mg capsule, , Disp: , Rfl: 0    furosemide (LASIX) 20 mg tablet, Take 1 tablet (20 mg total) by mouth daily, Disp: 7 tablet, Rfl: 0    hydrocortisone 2 5 % cream, Apply 1 application topically 2 (two) times a day, Disp: , Rfl: 0    lisinopril-hydrochlorothiazide (PRINZIDE,ZESTORETIC) 20-12 5 MG per tablet, take 1 tablet by mouth once daily, Disp: 90 tablet, Rfl: 4    omeprazole (PriLOSEC) 20 mg delayed release capsule, take 1 capsule by mouth daily, Disp: 30 capsule, Rfl: 0    TOVIAZ 8 MG TB24, , Disp: , Rfl: 0    meclizine (ANTIVERT) 12 5 MG tablet, Take 1 tablet (12 5 mg total) by mouth every 8 (eight) hours as needed for dizziness (Patient not taking: Reported on 11/12/2019), Disp: 30 tablet, Rfl: 0    methocarbamol (ROBAXIN) 500 mg tablet, Take 1 tablet (500 mg total) by mouth every 8 (eight) hours (Patient not taking: Reported on 11/12/2019), Disp: 90 tablet, Rfl: 1  No Known Allergies    Labs:not applicable  Imaging: No results found  Review of Systems:  Review of Systems   All other systems reviewed and are negative  Physical Exam:  /60 (BP Location: Left arm, Cuff Size: Standard)   Pulse 70   Ht 5' 3" (1 6 m)   Wt 68 kg (150 lb)   BMI 26 57 kg/m²   Physical Exam   Constitutional: She is oriented to person, place, and time  She appears well-developed and well-nourished  HENT:   Head: Normocephalic and atraumatic  Eyes: Pupils are equal, round, and reactive to light  Conjunctivae and EOM are normal    Neck: Normal range of motion  Neck supple  Cardiovascular: Normal rate  Murmur heard    Pulmonary/Chest: Effort normal and breath sounds normal    Neurological: She is alert and oriented to person, place, and time  Skin: Skin is warm and dry  Psychiatric: She has a normal mood and affect  Vitals reviewed  Discussion/Summary:I will continue the patient's present medical regimen  The patient appears well compensated  I have asked the patient to call if there is a problem in the interim otherwise I will see the patient in one years time  Patient is due for an echocardiogram prior to the next visit to assess LV wall thickness and systolic function

## 2020-02-04 ENCOUNTER — OFFICE VISIT (OUTPATIENT)
Dept: CARDIOLOGY CLINIC | Facility: CLINIC | Age: 75
End: 2020-02-04
Payer: COMMERCIAL

## 2020-02-04 VITALS
HEART RATE: 70 BPM | BODY MASS INDEX: 26.58 KG/M2 | DIASTOLIC BLOOD PRESSURE: 60 MMHG | HEIGHT: 63 IN | WEIGHT: 150 LBS | SYSTOLIC BLOOD PRESSURE: 128 MMHG

## 2020-02-04 DIAGNOSIS — I10 ESSENTIAL (PRIMARY) HYPERTENSION: Primary | ICD-10-CM

## 2020-02-04 DIAGNOSIS — I35.9 AORTIC VALVE DISEASE: ICD-10-CM

## 2020-02-04 DIAGNOSIS — E78.00 PURE HYPERCHOLESTEROLEMIA: ICD-10-CM

## 2020-02-04 PROCEDURE — 3008F BODY MASS INDEX DOCD: CPT | Performed by: INTERNAL MEDICINE

## 2020-02-04 PROCEDURE — 3074F SYST BP LT 130 MM HG: CPT | Performed by: INTERNAL MEDICINE

## 2020-02-04 PROCEDURE — 3078F DIAST BP <80 MM HG: CPT | Performed by: INTERNAL MEDICINE

## 2020-02-04 PROCEDURE — 1160F RVW MEDS BY RX/DR IN RCRD: CPT | Performed by: INTERNAL MEDICINE

## 2020-02-04 PROCEDURE — 1036F TOBACCO NON-USER: CPT | Performed by: INTERNAL MEDICINE

## 2020-02-04 PROCEDURE — 99214 OFFICE O/P EST MOD 30 MIN: CPT | Performed by: INTERNAL MEDICINE

## 2020-02-04 PROCEDURE — 4040F PNEUMOC VAC/ADMIN/RCVD: CPT | Performed by: INTERNAL MEDICINE

## 2020-02-04 PROCEDURE — 93000 ELECTROCARDIOGRAM COMPLETE: CPT | Performed by: INTERNAL MEDICINE

## 2020-04-02 DIAGNOSIS — I10 HYPERTENSION, UNSPECIFIED TYPE: ICD-10-CM

## 2020-04-02 RX ORDER — DILTIAZEM HYDROCHLORIDE 240 MG/1
240 CAPSULE, EXTENDED RELEASE ORAL DAILY
Qty: 90 CAPSULE | Refills: 1 | Status: SHIPPED | OUTPATIENT
Start: 2020-04-02 | End: 2020-09-26 | Stop reason: SDUPTHER

## 2020-04-08 ENCOUNTER — HOSPITAL ENCOUNTER (OUTPATIENT)
Facility: HOSPITAL | Age: 75
Setting detail: OBSERVATION
Discharge: HOME | End: 2020-04-09
Attending: EMERGENCY MEDICINE | Admitting: FAMILY MEDICINE
Payer: COMMERCIAL

## 2020-04-08 ENCOUNTER — APPOINTMENT (EMERGENCY)
Dept: RADIOLOGY | Facility: HOSPITAL | Age: 75
End: 2020-04-08
Payer: COMMERCIAL

## 2020-04-08 DIAGNOSIS — E87.6 HYPOKALEMIA: ICD-10-CM

## 2020-04-08 DIAGNOSIS — I35.9 AORTIC VALVE CALCIFICATION: ICD-10-CM

## 2020-04-08 DIAGNOSIS — I10 ESSENTIAL HYPERTENSION: ICD-10-CM

## 2020-04-08 DIAGNOSIS — E78.00 HYPERCHOLESTEROLEMIA: ICD-10-CM

## 2020-04-08 DIAGNOSIS — K21.9 GASTROESOPHAGEAL REFLUX DISEASE, ESOPHAGITIS PRESENCE NOT SPECIFIED: ICD-10-CM

## 2020-04-08 DIAGNOSIS — F41.9 ANXIETY: ICD-10-CM

## 2020-04-08 DIAGNOSIS — R77.8 TROPONIN LEVEL ELEVATED: Primary | ICD-10-CM

## 2020-04-08 PROBLEM — R07.89 CHEST TIGHTNESS: Status: ACTIVE | Noted: 2020-04-08

## 2020-04-08 LAB
ANION GAP SERPL CALCULATED.3IONS-SCNC: 6 MMOL/L (ref 4–13)
ATRIAL RATE: 80 BPM
BUN SERPL-MCNC: 12 MG/DL (ref 5–25)
CALCIUM SERPL-MCNC: 9.3 MG/DL (ref 8.3–10.1)
CHLORIDE SERPL-SCNC: 99 MMOL/L (ref 100–108)
CO2 SERPL-SCNC: 27 MMOL/L (ref 21–32)
CREAT SERPL-MCNC: 0.85 MG/DL (ref 0.6–1.3)
ERYTHROCYTE [DISTWIDTH] IN BLOOD BY AUTOMATED COUNT: 13.1 % (ref 11.6–15.1)
GFR SERPL CREATININE-BSD FRML MDRD: 68 ML/MIN/1.73SQ M
GLUCOSE SERPL-MCNC: 108 MG/DL (ref 65–140)
HCT VFR BLD AUTO: 37.6 % (ref 34.8–46.1)
HGB BLD-MCNC: 12.2 G/DL (ref 11.5–15.4)
MCH RBC QN AUTO: 28 PG (ref 26.8–34.3)
MCHC RBC AUTO-ENTMCNC: 32.4 G/DL (ref 31.4–37.4)
MCV RBC AUTO: 86 FL (ref 82–98)
P AXIS: 79 DEGREES
PLATELET # BLD AUTO: 297 THOUSANDS/UL (ref 149–390)
PLATELET # BLD AUTO: 306 THOUSANDS/UL (ref 149–390)
PMV BLD AUTO: 9.6 FL (ref 8.9–12.7)
PMV BLD AUTO: 9.6 FL (ref 8.9–12.7)
POTASSIUM SERPL-SCNC: 3 MMOL/L (ref 3.5–5.3)
PR INTERVAL: 200 MS
QRS AXIS: 55 DEGREES
QRSD INTERVAL: 144 MS
QT INTERVAL: 398 MS
QTC INTERVAL: 459 MS
RBC # BLD AUTO: 4.35 MILLION/UL (ref 3.81–5.12)
SODIUM SERPL-SCNC: 132 MMOL/L (ref 136–145)
T WAVE AXIS: 79 DEGREES
TROPONIN I SERPL-MCNC: 0.11 NG/ML
TROPONIN I SERPL-MCNC: 0.14 NG/ML
TROPONIN I SERPL-MCNC: 0.15 NG/ML
VENTRICULAR RATE: 80 BPM
WBC # BLD AUTO: 10.12 THOUSAND/UL (ref 4.31–10.16)

## 2020-04-08 PROCEDURE — 84484 ASSAY OF TROPONIN QUANT: CPT | Performed by: FAMILY MEDICINE

## 2020-04-08 PROCEDURE — 71046 X-RAY EXAM CHEST 2 VIEWS: CPT

## 2020-04-08 PROCEDURE — 93010 ELECTROCARDIOGRAM REPORT: CPT | Performed by: INTERNAL MEDICINE

## 2020-04-08 PROCEDURE — 36415 COLL VENOUS BLD VENIPUNCTURE: CPT | Performed by: EMERGENCY MEDICINE

## 2020-04-08 PROCEDURE — 93005 ELECTROCARDIOGRAM TRACING: CPT

## 2020-04-08 PROCEDURE — 99285 EMERGENCY DEPT VISIT HI MDM: CPT

## 2020-04-08 PROCEDURE — 85049 AUTOMATED PLATELET COUNT: CPT | Performed by: FAMILY MEDICINE

## 2020-04-08 PROCEDURE — 99284 EMERGENCY DEPT VISIT MOD MDM: CPT | Performed by: EMERGENCY MEDICINE

## 2020-04-08 PROCEDURE — 85027 COMPLETE CBC AUTOMATED: CPT | Performed by: EMERGENCY MEDICINE

## 2020-04-08 PROCEDURE — 80048 BASIC METABOLIC PNL TOTAL CA: CPT | Performed by: EMERGENCY MEDICINE

## 2020-04-08 PROCEDURE — 84484 ASSAY OF TROPONIN QUANT: CPT | Performed by: EMERGENCY MEDICINE

## 2020-04-08 RX ORDER — PANTOPRAZOLE SODIUM 40 MG/1
40 TABLET, DELAYED RELEASE ORAL
Status: DISCONTINUED | OUTPATIENT
Start: 2020-04-08 | End: 2020-04-09 | Stop reason: HOSPADM

## 2020-04-08 RX ORDER — ACETAMINOPHEN 325 MG/1
650 TABLET ORAL EVERY 6 HOURS PRN
Status: DISCONTINUED | OUTPATIENT
Start: 2020-04-08 | End: 2020-04-09 | Stop reason: HOSPADM

## 2020-04-08 RX ORDER — POTASSIUM CHLORIDE 20 MEQ/1
40 TABLET, EXTENDED RELEASE ORAL ONCE
Status: COMPLETED | OUTPATIENT
Start: 2020-04-08 | End: 2020-04-08

## 2020-04-08 RX ORDER — ALPRAZOLAM 0.25 MG/1
0.25 TABLET ORAL 2 TIMES DAILY
Status: DISCONTINUED | OUTPATIENT
Start: 2020-04-08 | End: 2020-04-09 | Stop reason: HOSPADM

## 2020-04-08 RX ORDER — ALPRAZOLAM 0.25 MG/1
0.25 TABLET ORAL ONCE
Status: COMPLETED | OUTPATIENT
Start: 2020-04-08 | End: 2020-04-08

## 2020-04-08 RX ORDER — ASPIRIN 325 MG
325 TABLET ORAL ONCE
Status: COMPLETED | OUTPATIENT
Start: 2020-04-08 | End: 2020-04-08

## 2020-04-08 RX ORDER — ATORVASTATIN CALCIUM 10 MG/1
10 TABLET, FILM COATED ORAL DAILY
Status: DISCONTINUED | OUTPATIENT
Start: 2020-04-09 | End: 2020-04-09 | Stop reason: HOSPADM

## 2020-04-08 RX ORDER — HEPARIN SODIUM 5000 [USP'U]/ML
5000 INJECTION, SOLUTION INTRAVENOUS; SUBCUTANEOUS EVERY 8 HOURS SCHEDULED
Status: DISCONTINUED | OUTPATIENT
Start: 2020-04-08 | End: 2020-04-09 | Stop reason: HOSPADM

## 2020-04-08 RX ORDER — PANTOPRAZOLE SODIUM 20 MG/1
20 TABLET, DELAYED RELEASE ORAL
Status: DISCONTINUED | OUTPATIENT
Start: 2020-04-09 | End: 2020-04-08

## 2020-04-08 RX ORDER — LIDOCAINE HYDROCHLORIDE AND EPINEPHRINE 10; 10 MG/ML; UG/ML
5 INJECTION, SOLUTION INFILTRATION; PERINEURAL ONCE
Status: DISCONTINUED | OUTPATIENT
Start: 2020-04-08 | End: 2020-04-08

## 2020-04-08 RX ORDER — PANTOPRAZOLE SODIUM 40 MG/1
40 INJECTION, POWDER, FOR SOLUTION INTRAVENOUS ONCE
Status: DISCONTINUED | OUTPATIENT
Start: 2020-04-08 | End: 2020-04-08

## 2020-04-08 RX ORDER — DILTIAZEM HYDROCHLORIDE 120 MG/1
240 CAPSULE, EXTENDED RELEASE ORAL DAILY
Status: DISCONTINUED | OUTPATIENT
Start: 2020-04-09 | End: 2020-04-09 | Stop reason: HOSPADM

## 2020-04-08 RX ADMIN — ALPRAZOLAM 0.25 MG: 0.25 TABLET ORAL at 15:47

## 2020-04-08 RX ADMIN — ASPIRIN 325 MG ORAL TABLET 325 MG: 325 PILL ORAL at 17:03

## 2020-04-08 RX ADMIN — ALPRAZOLAM 0.25 MG: 0.25 TABLET ORAL at 20:14

## 2020-04-08 RX ADMIN — HEPARIN SODIUM 5000 UNITS: 5000 INJECTION INTRAVENOUS; SUBCUTANEOUS at 22:33

## 2020-04-08 RX ADMIN — POTASSIUM CHLORIDE 40 MEQ: 1500 TABLET, EXTENDED RELEASE ORAL at 15:47

## 2020-04-09 ENCOUNTER — APPOINTMENT (OUTPATIENT)
Dept: NON INVASIVE DIAGNOSTICS | Facility: HOSPITAL | Age: 75
End: 2020-04-09
Payer: COMMERCIAL

## 2020-04-09 VITALS
HEART RATE: 52 BPM | BODY MASS INDEX: 24.92 KG/M2 | TEMPERATURE: 98.9 F | OXYGEN SATURATION: 97 % | HEIGHT: 64 IN | DIASTOLIC BLOOD PRESSURE: 59 MMHG | SYSTOLIC BLOOD PRESSURE: 137 MMHG | WEIGHT: 145.94 LBS | RESPIRATION RATE: 18 BRPM

## 2020-04-09 PROBLEM — R77.8 ELEVATED TROPONIN LEVEL: Status: ACTIVE | Noted: 2020-04-09

## 2020-04-09 PROBLEM — R79.89 ELEVATED TROPONIN LEVEL: Status: ACTIVE | Noted: 2020-04-09

## 2020-04-09 LAB
ANION GAP SERPL CALCULATED.3IONS-SCNC: 6 MMOL/L (ref 4–13)
ATRIAL RATE: 64 BPM
BASOPHILS # BLD AUTO: 0.04 THOUSANDS/ΜL (ref 0–0.1)
BASOPHILS NFR BLD AUTO: 0 % (ref 0–1)
BUN SERPL-MCNC: 9 MG/DL (ref 5–25)
CALCIUM SERPL-MCNC: 9.6 MG/DL (ref 8.3–10.1)
CHLORIDE SERPL-SCNC: 103 MMOL/L (ref 100–108)
CO2 SERPL-SCNC: 26 MMOL/L (ref 21–32)
CREAT SERPL-MCNC: 0.74 MG/DL (ref 0.6–1.3)
EOSINOPHIL # BLD AUTO: 0.04 THOUSAND/ΜL (ref 0–0.61)
EOSINOPHIL NFR BLD AUTO: 0 % (ref 0–6)
ERYTHROCYTE [DISTWIDTH] IN BLOOD BY AUTOMATED COUNT: 13 % (ref 11.6–15.1)
GFR SERPL CREATININE-BSD FRML MDRD: 80 ML/MIN/1.73SQ M
GLUCOSE P FAST SERPL-MCNC: 106 MG/DL (ref 65–99)
GLUCOSE SERPL-MCNC: 106 MG/DL (ref 65–140)
HCT VFR BLD AUTO: 39.2 % (ref 34.8–46.1)
HGB BLD-MCNC: 13 G/DL (ref 11.5–15.4)
IMM GRANULOCYTES # BLD AUTO: 0.05 THOUSAND/UL (ref 0–0.2)
IMM GRANULOCYTES NFR BLD AUTO: 0 % (ref 0–2)
LYMPHOCYTES # BLD AUTO: 3.18 THOUSANDS/ΜL (ref 0.6–4.47)
LYMPHOCYTES NFR BLD AUTO: 25 % (ref 14–44)
MCH RBC QN AUTO: 28.2 PG (ref 26.8–34.3)
MCHC RBC AUTO-ENTMCNC: 33.2 G/DL (ref 31.4–37.4)
MCV RBC AUTO: 85 FL (ref 82–98)
MONOCYTES # BLD AUTO: 0.64 THOUSAND/ΜL (ref 0.17–1.22)
MONOCYTES NFR BLD AUTO: 5 % (ref 4–12)
NEUTROPHILS # BLD AUTO: 8.8 THOUSANDS/ΜL (ref 1.85–7.62)
NEUTS SEG NFR BLD AUTO: 70 % (ref 43–75)
NRBC BLD AUTO-RTO: 0 /100 WBCS
P AXIS: 117 DEGREES
PLATELET # BLD AUTO: 362 THOUSANDS/UL (ref 149–390)
PMV BLD AUTO: 10.4 FL (ref 8.9–12.7)
POTASSIUM SERPL-SCNC: 3.2 MMOL/L (ref 3.5–5.3)
PR INTERVAL: 202 MS
QRS AXIS: 185 DEGREES
QRSD INTERVAL: 148 MS
QT INTERVAL: 474 MS
QTC INTERVAL: 489 MS
RBC # BLD AUTO: 4.61 MILLION/UL (ref 3.81–5.12)
SODIUM SERPL-SCNC: 135 MMOL/L (ref 136–145)
T WAVE AXIS: 122 DEGREES
TROPONIN I SERPL-MCNC: 0.17 NG/ML
TROPONIN I SERPL-MCNC: 0.18 NG/ML
TROPONIN I SERPL-MCNC: 0.19 NG/ML
TROPONIN I SERPL-MCNC: 0.19 NG/ML
VENTRICULAR RATE: 64 BPM
WBC # BLD AUTO: 12.75 THOUSAND/UL (ref 4.31–10.16)

## 2020-04-09 PROCEDURE — 93306 TTE W/DOPPLER COMPLETE: CPT

## 2020-04-09 PROCEDURE — 84484 ASSAY OF TROPONIN QUANT: CPT | Performed by: FAMILY MEDICINE

## 2020-04-09 PROCEDURE — 99214 OFFICE O/P EST MOD 30 MIN: CPT | Performed by: FAMILY MEDICINE

## 2020-04-09 PROCEDURE — NC001 PR NO CHARGE: Performed by: FAMILY MEDICINE

## 2020-04-09 PROCEDURE — 93005 ELECTROCARDIOGRAM TRACING: CPT

## 2020-04-09 PROCEDURE — 93010 ELECTROCARDIOGRAM REPORT: CPT | Performed by: INTERNAL MEDICINE

## 2020-04-09 PROCEDURE — 93306 TTE W/DOPPLER COMPLETE: CPT | Performed by: INTERNAL MEDICINE

## 2020-04-09 PROCEDURE — 80048 BASIC METABOLIC PNL TOTAL CA: CPT | Performed by: FAMILY MEDICINE

## 2020-04-09 PROCEDURE — 85025 COMPLETE CBC W/AUTO DIFF WBC: CPT | Performed by: FAMILY MEDICINE

## 2020-04-09 PROCEDURE — 99214 OFFICE O/P EST MOD 30 MIN: CPT | Performed by: INTERNAL MEDICINE

## 2020-04-09 RX ORDER — OMEPRAZOLE 20 MG/1
40 CAPSULE, DELAYED RELEASE ORAL DAILY
Qty: 60 CAPSULE | Refills: 0 | Status: SHIPPED | OUTPATIENT
Start: 2020-04-09 | End: 2020-04-24 | Stop reason: SDUPTHER

## 2020-04-09 RX ORDER — ATORVASTATIN CALCIUM 10 MG/1
40 TABLET, FILM COATED ORAL DAILY
Qty: 90 TABLET | Refills: 0 | Status: SHIPPED | OUTPATIENT
Start: 2020-04-09 | End: 2020-04-24 | Stop reason: SDUPTHER

## 2020-04-09 RX ORDER — POTASSIUM CHLORIDE 20 MEQ/1
40 TABLET, EXTENDED RELEASE ORAL ONCE
Status: COMPLETED | OUTPATIENT
Start: 2020-04-09 | End: 2020-04-09

## 2020-04-09 RX ADMIN — PANTOPRAZOLE SODIUM 40 MG: 40 TABLET, DELAYED RELEASE ORAL at 05:15

## 2020-04-09 RX ADMIN — ATORVASTATIN CALCIUM 10 MG: 10 TABLET, FILM COATED ORAL at 08:11

## 2020-04-09 RX ADMIN — HEPARIN SODIUM 5000 UNITS: 5000 INJECTION INTRAVENOUS; SUBCUTANEOUS at 05:16

## 2020-04-09 RX ADMIN — LISINOPRIL: 20 TABLET ORAL at 08:11

## 2020-04-09 RX ADMIN — DILTIAZEM HYDROCHLORIDE 240 MG: 120 CAPSULE, EXTENDED RELEASE ORAL at 08:11

## 2020-04-09 RX ADMIN — HEPARIN SODIUM 5000 UNITS: 5000 INJECTION INTRAVENOUS; SUBCUTANEOUS at 14:44

## 2020-04-09 RX ADMIN — POTASSIUM CHLORIDE 40 MEQ: 1500 TABLET, EXTENDED RELEASE ORAL at 08:11

## 2020-04-09 RX ADMIN — ALPRAZOLAM 0.25 MG: 0.25 TABLET ORAL at 08:11

## 2020-04-10 ENCOUNTER — TRANSITIONAL CARE MANAGEMENT (OUTPATIENT)
Dept: FAMILY MEDICINE CLINIC | Facility: CLINIC | Age: 75
End: 2020-04-10

## 2020-04-13 ENCOUNTER — APPOINTMENT (EMERGENCY)
Dept: RADIOLOGY | Facility: HOSPITAL | Age: 75
End: 2020-04-13
Payer: COMMERCIAL

## 2020-04-13 ENCOUNTER — HOSPITAL ENCOUNTER (EMERGENCY)
Facility: HOSPITAL | Age: 75
Discharge: HOME | End: 2020-04-13
Attending: EMERGENCY MEDICINE | Admitting: EMERGENCY MEDICINE
Payer: COMMERCIAL

## 2020-04-13 VITALS
WEIGHT: 145 LBS | DIASTOLIC BLOOD PRESSURE: 75 MMHG | OXYGEN SATURATION: 100 % | SYSTOLIC BLOOD PRESSURE: 173 MMHG | TEMPERATURE: 98.7 F | HEART RATE: 68 BPM | BODY MASS INDEX: 24.89 KG/M2 | RESPIRATION RATE: 16 BRPM

## 2020-04-13 DIAGNOSIS — R77.8 ELEVATED TROPONIN: ICD-10-CM

## 2020-04-13 DIAGNOSIS — R20.2 FACIAL PARESTHESIA: Primary | ICD-10-CM

## 2020-04-13 DIAGNOSIS — E87.1 CHRONIC HYPONATREMIA: ICD-10-CM

## 2020-04-13 LAB
ANION GAP SERPL CALCULATED.3IONS-SCNC: 7 MMOL/L (ref 4–13)
BASOPHILS # BLD AUTO: 0.06 THOUSANDS/ΜL (ref 0–0.1)
BASOPHILS NFR BLD AUTO: 1 % (ref 0–1)
BUN SERPL-MCNC: 13 MG/DL (ref 5–25)
CALCIUM SERPL-MCNC: 9.1 MG/DL (ref 8.3–10.1)
CHLORIDE SERPL-SCNC: 96 MMOL/L (ref 100–108)
CO2 SERPL-SCNC: 27 MMOL/L (ref 21–32)
CREAT SERPL-MCNC: 1.05 MG/DL (ref 0.6–1.3)
EOSINOPHIL # BLD AUTO: 0.14 THOUSAND/ΜL (ref 0–0.61)
EOSINOPHIL NFR BLD AUTO: 1 % (ref 0–6)
ERYTHROCYTE [DISTWIDTH] IN BLOOD BY AUTOMATED COUNT: 13.2 % (ref 11.6–15.1)
GFR SERPL CREATININE-BSD FRML MDRD: 52 ML/MIN/1.73SQ M
GLUCOSE SERPL-MCNC: 118 MG/DL (ref 65–140)
HCT VFR BLD AUTO: 36.5 % (ref 34.8–46.1)
HGB BLD-MCNC: 12.1 G/DL (ref 11.5–15.4)
IMM GRANULOCYTES # BLD AUTO: 0.04 THOUSAND/UL (ref 0–0.2)
IMM GRANULOCYTES NFR BLD AUTO: 0 % (ref 0–2)
LYMPHOCYTES # BLD AUTO: 2.86 THOUSANDS/ΜL (ref 0.6–4.47)
LYMPHOCYTES NFR BLD AUTO: 25 % (ref 14–44)
MCH RBC QN AUTO: 28.9 PG (ref 26.8–34.3)
MCHC RBC AUTO-ENTMCNC: 33.2 G/DL (ref 31.4–37.4)
MCV RBC AUTO: 87 FL (ref 82–98)
MONOCYTES # BLD AUTO: 0.83 THOUSAND/ΜL (ref 0.17–1.22)
MONOCYTES NFR BLD AUTO: 7 % (ref 4–12)
NEUTROPHILS # BLD AUTO: 7.62 THOUSANDS/ΜL (ref 1.85–7.62)
NEUTS SEG NFR BLD AUTO: 66 % (ref 43–75)
NRBC BLD AUTO-RTO: 0 /100 WBCS
PLATELET # BLD AUTO: 265 THOUSANDS/UL (ref 149–390)
PMV BLD AUTO: 10.9 FL (ref 8.9–12.7)
POTASSIUM SERPL-SCNC: 3.8 MMOL/L (ref 3.5–5.3)
RBC # BLD AUTO: 4.18 MILLION/UL (ref 3.81–5.12)
SODIUM SERPL-SCNC: 130 MMOL/L (ref 136–145)
TROPONIN I SERPL-MCNC: 0.08 NG/ML
WBC # BLD AUTO: 11.55 THOUSAND/UL (ref 4.31–10.16)

## 2020-04-13 PROCEDURE — 93005 ELECTROCARDIOGRAM TRACING: CPT

## 2020-04-13 PROCEDURE — 36415 COLL VENOUS BLD VENIPUNCTURE: CPT | Performed by: EMERGENCY MEDICINE

## 2020-04-13 PROCEDURE — 99282 EMERGENCY DEPT VISIT SF MDM: CPT | Performed by: EMERGENCY MEDICINE

## 2020-04-13 PROCEDURE — 70450 CT HEAD/BRAIN W/O DYE: CPT

## 2020-04-13 PROCEDURE — 84484 ASSAY OF TROPONIN QUANT: CPT | Performed by: EMERGENCY MEDICINE

## 2020-04-13 PROCEDURE — 85025 COMPLETE CBC W/AUTO DIFF WBC: CPT | Performed by: EMERGENCY MEDICINE

## 2020-04-13 PROCEDURE — 99284 EMERGENCY DEPT VISIT MOD MDM: CPT

## 2020-04-13 PROCEDURE — 80048 BASIC METABOLIC PNL TOTAL CA: CPT | Performed by: EMERGENCY MEDICINE

## 2020-04-13 RX ORDER — ASPIRIN 81 MG/1
324 TABLET, CHEWABLE ORAL ONCE
Status: COMPLETED | OUTPATIENT
Start: 2020-04-13 | End: 2020-04-13

## 2020-04-13 RX ADMIN — ASPIRIN 81 MG 324 MG: 81 TABLET ORAL at 22:20

## 2020-04-14 LAB
ATRIAL RATE: 77 BPM
P AXIS: 65 DEGREES
PR INTERVAL: 196 MS
QRS AXIS: 52 DEGREES
QRSD INTERVAL: 146 MS
QT INTERVAL: 384 MS
QTC INTERVAL: 434 MS
T WAVE AXIS: -6 DEGREES
VENTRICULAR RATE: 77 BPM

## 2020-04-14 PROCEDURE — 93010 ELECTROCARDIOGRAM REPORT: CPT | Performed by: INTERNAL MEDICINE

## 2020-04-20 ENCOUNTER — OFFICE VISIT (OUTPATIENT)
Dept: CARDIOLOGY CLINIC | Facility: CLINIC | Age: 75
End: 2020-04-20
Payer: COMMERCIAL

## 2020-04-20 VITALS
DIASTOLIC BLOOD PRESSURE: 80 MMHG | HEIGHT: 64 IN | SYSTOLIC BLOOD PRESSURE: 124 MMHG | WEIGHT: 141 LBS | HEART RATE: 66 BPM | BODY MASS INDEX: 24.07 KG/M2

## 2020-04-20 DIAGNOSIS — I10 ESSENTIAL (PRIMARY) HYPERTENSION: Primary | ICD-10-CM

## 2020-04-20 DIAGNOSIS — I35.9 AORTIC VALVE DISEASE: ICD-10-CM

## 2020-04-20 DIAGNOSIS — E78.00 PURE HYPERCHOLESTEROLEMIA: ICD-10-CM

## 2020-04-20 DIAGNOSIS — R07.9 CHEST PAIN, UNSPECIFIED TYPE: ICD-10-CM

## 2020-04-20 DIAGNOSIS — I44.7 LBBB (LEFT BUNDLE BRANCH BLOCK): ICD-10-CM

## 2020-04-20 PROCEDURE — 3079F DIAST BP 80-89 MM HG: CPT | Performed by: INTERNAL MEDICINE

## 2020-04-20 PROCEDURE — 1036F TOBACCO NON-USER: CPT | Performed by: INTERNAL MEDICINE

## 2020-04-20 PROCEDURE — 99214 OFFICE O/P EST MOD 30 MIN: CPT | Performed by: INTERNAL MEDICINE

## 2020-04-20 PROCEDURE — 4040F PNEUMOC VAC/ADMIN/RCVD: CPT | Performed by: INTERNAL MEDICINE

## 2020-04-20 PROCEDURE — 3074F SYST BP LT 130 MM HG: CPT | Performed by: INTERNAL MEDICINE

## 2020-04-20 PROCEDURE — 3008F BODY MASS INDEX DOCD: CPT | Performed by: INTERNAL MEDICINE

## 2020-04-20 PROCEDURE — 1160F RVW MEDS BY RX/DR IN RCRD: CPT | Performed by: INTERNAL MEDICINE

## 2020-04-20 RX ORDER — ATORVASTATIN CALCIUM 40 MG/1
TABLET, FILM COATED ORAL
COMMUNITY
Start: 2020-04-10 | End: 2020-07-13 | Stop reason: SDUPTHER

## 2020-04-24 ENCOUNTER — TELEMEDICINE (OUTPATIENT)
Dept: FAMILY MEDICINE CLINIC | Facility: CLINIC | Age: 75
End: 2020-04-24

## 2020-04-24 VITALS — DIASTOLIC BLOOD PRESSURE: 70 MMHG | BODY MASS INDEX: 24.2 KG/M2 | WEIGHT: 141 LBS | SYSTOLIC BLOOD PRESSURE: 120 MMHG

## 2020-04-24 DIAGNOSIS — K21.9 GASTROESOPHAGEAL REFLUX DISEASE, ESOPHAGITIS PRESENCE NOT SPECIFIED: ICD-10-CM

## 2020-04-24 DIAGNOSIS — I10 ESSENTIAL HYPERTENSION: Primary | ICD-10-CM

## 2020-04-24 DIAGNOSIS — F41.9 ANXIETY DISORDER, UNSPECIFIED TYPE: ICD-10-CM

## 2020-04-24 PROCEDURE — G2012 BRIEF CHECK IN BY MD/QHP: HCPCS | Performed by: FAMILY MEDICINE

## 2020-04-24 RX ORDER — PERPHENAZINE 2 MG/1
2 TABLET, FILM COATED ORAL
COMMUNITY
Start: 2020-04-08 | End: 2021-03-16

## 2020-04-24 RX ORDER — OMEPRAZOLE 20 MG/1
40 CAPSULE, DELAYED RELEASE ORAL DAILY
Qty: 90 CAPSULE | Refills: 1 | Status: SHIPPED | OUTPATIENT
Start: 2020-04-24 | End: 2020-07-02 | Stop reason: SDUPTHER

## 2020-05-08 DIAGNOSIS — I10 HYPERTENSION, UNSPECIFIED TYPE: ICD-10-CM

## 2020-05-08 RX ORDER — LISINOPRIL AND HYDROCHLOROTHIAZIDE 20; 12.5 MG/1; MG/1
TABLET ORAL
Qty: 90 TABLET | Refills: 4 | Status: SHIPPED | OUTPATIENT
Start: 2020-05-08 | End: 2020-07-30

## 2020-06-16 ENCOUNTER — OFFICE VISIT (OUTPATIENT)
Dept: OBGYN CLINIC | Facility: CLINIC | Age: 75
End: 2020-06-16
Payer: COMMERCIAL

## 2020-06-16 VITALS
HEART RATE: 73 BPM | HEIGHT: 63 IN | DIASTOLIC BLOOD PRESSURE: 81 MMHG | BODY MASS INDEX: 25.16 KG/M2 | WEIGHT: 142 LBS | SYSTOLIC BLOOD PRESSURE: 150 MMHG

## 2020-06-16 DIAGNOSIS — M17.11 PRIMARY OSTEOARTHRITIS OF RIGHT KNEE: Primary | ICD-10-CM

## 2020-06-16 PROCEDURE — 3008F BODY MASS INDEX DOCD: CPT | Performed by: ORTHOPAEDIC SURGERY

## 2020-06-16 PROCEDURE — 20610 DRAIN/INJ JOINT/BURSA W/O US: CPT | Performed by: ORTHOPAEDIC SURGERY

## 2020-06-16 PROCEDURE — 99214 OFFICE O/P EST MOD 30 MIN: CPT | Performed by: ORTHOPAEDIC SURGERY

## 2020-06-16 PROCEDURE — 4040F PNEUMOC VAC/ADMIN/RCVD: CPT | Performed by: ORTHOPAEDIC SURGERY

## 2020-06-16 PROCEDURE — 3077F SYST BP >= 140 MM HG: CPT | Performed by: ORTHOPAEDIC SURGERY

## 2020-06-16 PROCEDURE — 3079F DIAST BP 80-89 MM HG: CPT | Performed by: ORTHOPAEDIC SURGERY

## 2020-06-16 PROCEDURE — 1036F TOBACCO NON-USER: CPT | Performed by: ORTHOPAEDIC SURGERY

## 2020-06-16 PROCEDURE — 1160F RVW MEDS BY RX/DR IN RCRD: CPT | Performed by: ORTHOPAEDIC SURGERY

## 2020-06-16 RX ORDER — METHYLPREDNISOLONE ACETATE 40 MG/ML
1 INJECTION, SUSPENSION INTRA-ARTICULAR; INTRALESIONAL; INTRAMUSCULAR; SOFT TISSUE
Status: COMPLETED | OUTPATIENT
Start: 2020-06-16 | End: 2020-06-16

## 2020-06-16 RX ORDER — LIDOCAINE HYDROCHLORIDE 10 MG/ML
4 INJECTION, SOLUTION INFILTRATION; PERINEURAL
Status: COMPLETED | OUTPATIENT
Start: 2020-06-16 | End: 2020-06-16

## 2020-06-16 RX ADMIN — LIDOCAINE HYDROCHLORIDE 4 ML: 10 INJECTION, SOLUTION INFILTRATION; PERINEURAL at 08:37

## 2020-06-16 RX ADMIN — METHYLPREDNISOLONE ACETATE 1 ML: 40 INJECTION, SUSPENSION INTRA-ARTICULAR; INTRALESIONAL; INTRAMUSCULAR; SOFT TISSUE at 08:37

## 2020-07-02 DIAGNOSIS — K21.9 GASTROESOPHAGEAL REFLUX DISEASE, ESOPHAGITIS PRESENCE NOT SPECIFIED: ICD-10-CM

## 2020-07-02 RX ORDER — OMEPRAZOLE 20 MG/1
40 CAPSULE, DELAYED RELEASE ORAL DAILY
Qty: 90 CAPSULE | Refills: 1 | Status: SHIPPED | OUTPATIENT
Start: 2020-07-02 | End: 2020-11-24

## 2020-07-13 DIAGNOSIS — E78.00 HYPERCHOLESTEROLEMIA: Primary | ICD-10-CM

## 2020-07-13 RX ORDER — ATORVASTATIN CALCIUM 40 MG/1
40 TABLET, FILM COATED ORAL DAILY
Qty: 90 TABLET | Refills: 1 | Status: SHIPPED | OUTPATIENT
Start: 2020-07-13 | End: 2021-02-15 | Stop reason: SDUPTHER

## 2020-07-21 ENCOUNTER — OFFICE VISIT (OUTPATIENT)
Dept: FAMILY MEDICINE CLINIC | Facility: CLINIC | Age: 75
End: 2020-07-21

## 2020-07-21 VITALS
WEIGHT: 144 LBS | SYSTOLIC BLOOD PRESSURE: 128 MMHG | RESPIRATION RATE: 14 BRPM | HEIGHT: 64 IN | TEMPERATURE: 96.7 F | DIASTOLIC BLOOD PRESSURE: 68 MMHG | BODY MASS INDEX: 24.59 KG/M2 | HEART RATE: 74 BPM

## 2020-07-21 DIAGNOSIS — G89.29 CHRONIC BILATERAL LOW BACK PAIN WITHOUT SCIATICA: ICD-10-CM

## 2020-07-21 DIAGNOSIS — M54.50 CHRONIC BILATERAL LOW BACK PAIN WITHOUT SCIATICA: ICD-10-CM

## 2020-07-21 DIAGNOSIS — Z12.11 SCREENING FOR COLON CANCER: Primary | ICD-10-CM

## 2020-07-21 PROCEDURE — 4040F PNEUMOC VAC/ADMIN/RCVD: CPT | Performed by: FAMILY MEDICINE

## 2020-07-21 PROCEDURE — 3008F BODY MASS INDEX DOCD: CPT | Performed by: ORTHOPAEDIC SURGERY

## 2020-07-21 PROCEDURE — 3074F SYST BP LT 130 MM HG: CPT | Performed by: FAMILY MEDICINE

## 2020-07-21 PROCEDURE — 99213 OFFICE O/P EST LOW 20 MIN: CPT | Performed by: FAMILY MEDICINE

## 2020-07-21 PROCEDURE — 3008F BODY MASS INDEX DOCD: CPT | Performed by: FAMILY MEDICINE

## 2020-07-21 PROCEDURE — 1036F TOBACCO NON-USER: CPT | Performed by: FAMILY MEDICINE

## 2020-07-21 PROCEDURE — 1160F RVW MEDS BY RX/DR IN RCRD: CPT | Performed by: FAMILY MEDICINE

## 2020-07-21 PROCEDURE — 3078F DIAST BP <80 MM HG: CPT | Performed by: FAMILY MEDICINE

## 2020-07-21 NOTE — ASSESSMENT & PLAN NOTE
Worsening  -Patient reports worsening symptoms for the last 2 months  -Currently taking omeprazole 40 mg daily   -She states certain type of rice gets stuck in her throat, but she hasn't noticed with other type of dry food   -ambulatory referral to GI given due to hx of GERD and also possible screening colonoscopy (patient reports she had with Dr Taylor Hall it but unsure of time and results, she has no records)

## 2020-07-21 NOTE — PROGRESS NOTES
Assessment/Plan:    Low back pain  Chronic  -Patient reports tylenol is not helping with pain    -Robaxin previously prescribed not covered by insurance  Recommended avoiding muscle relaxants specially since she is taking Xanax BID for anxiety for many years, which can have sedative effects and respiratory depression  -OMT referral for back pain given, consider physical therapy if pain does not improve    GERD (gastroesophageal reflux disease)  Worsening  -Patient reports worsening symptoms for the last 2 months  -Currently taking omeprazole 40 mg daily   -She states certain type of rice gets stuck in her throat, but she hasn't noticed with other type of dry food   -ambulatory referral to GI given due to hx of GERD and also possible screening colonoscopy (patient reports she had with Dr Calos carbone but unsure of time and results, she has no records)         Problem List Items Addressed This Visit        Other    Low back pain     Chronic  -Patient reports tylenol is not helping with pain    -Robaxin previously prescribed not covered by insurance  Recommended avoiding muscle relaxants specially since she is taking Xanax BID for anxiety for many years, which can have sedative effects and respiratory depression  -OMT referral for back pain given, consider physical therapy if pain does not improve           Other Visit Diagnoses     Screening for colon cancer    -  Primary    Relevant Orders    Ambulatory referral to Gastroenterology            Subjective:      Patient ID: Javed Lynn is a 76 y o  female  HPI     Patient presents to the office for an acute visit due to difficulty swallowing certain type of rice she used to eat ("sam") but reports that with other dry food she doesn't feel food get stuck  She currently is taking omeprazole 40 mg daily, and she feels that her GERD symptoms are getting worse and she reports medication is not helping as it used to  She also reports back pain   This is a chronic problem  She denies urinary or bowel incontinence, fever  She states tylenol is not helping with pain  She reports currently pain is 5/10  She was previously prescribed robaxin but states her insurance wont cover for it  The following portions of the patient's history were reviewed and updated as appropriate:   She  has a past medical history of Arthritis, Chest pain, Edema of lower extremity, Esophageal reflux, cardiac cath, and Hypertension  She  has a past surgical history that includes Bladder surgery  She  reports that she has never smoked  She has never used smokeless tobacco  She reports that she does not drink alcohol or use drugs    Current Outpatient Medications   Medication Sig Dispense Refill    ALPRAZolam (XANAX) 0 25 mg tablet Take 1 tablet by mouth 2 (two) times a day      atorvastatin (LIPITOR) 40 mg tablet Take 1 tablet (40 mg total) by mouth daily 90 tablet 1    clobetasol (TEMOVATE) 0 05 % cream Apply 1 application topically 2 (two) times a day  0    diltiazem (Dilt-XR) 240 MG 24 hr capsule Take 1 capsule (240 mg total) by mouth daily 90 capsule 1    DOCQLACE 100 MG capsule take 1 capsule by mouth twice a day 60 capsule 3    FLUAD 0 5 ML BERNABE inject 0 5 milliliter intramuscularly  0    furosemide (LASIX) 20 mg tablet Take 1 tablet (20 mg total) by mouth daily 7 tablet 0    hydrocortisone 2 5 % cream Apply 1 application topically 2 (two) times a day  0    lisinopril-hydrochlorothiazide (PRINZIDE,ZESTORETIC) 20-12 5 MG per tablet take 1 tablet by mouth once daily 90 tablet 4    meclizine (ANTIVERT) 12 5 MG tablet Take 1 tablet (12 5 mg total) by mouth every 8 (eight) hours as needed for dizziness 30 tablet 0    omeprazole (PriLOSEC) 20 mg delayed release capsule Take 2 capsules (40 mg total) by mouth daily 90 capsule 1    perphenazine 2 mg tablet Take 2 mg by mouth daily at bedtime      TOVIAZ 8 MG TB24   0    FLUoxetine (PROzac) 20 mg capsule   0     No current facility-administered medications for this visit  She has No Known Allergies       Review of Systems   Constitutional: Negative for appetite change, fatigue and fever  HENT: Positive for trouble swallowing  Negative for congestion, rhinorrhea and sinus pain  Respiratory: Negative for cough, chest tightness and shortness of breath  Cardiovascular: Negative for chest pain, palpitations and leg swelling  Gastrointestinal: Negative for abdominal distention, abdominal pain, constipation, diarrhea, nausea and vomiting  Genitourinary: Negative for frequency  Musculoskeletal: Positive for back pain  Neurological: Negative for light-headedness and headaches  Psychiatric/Behavioral: The patient is not nervous/anxious  Objective:      /68 (BP Location: Left arm, Patient Position: Sitting, Cuff Size: Large)   Pulse 74   Temp (!) 96 7 °F (35 9 °C) (Tympanic)   Resp 14   Ht 5' 4" (1 626 m)   Wt 65 3 kg (144 lb)   BMI 24 72 kg/m²          Physical Exam   Constitutional: She is oriented to person, place, and time  She appears well-developed and well-nourished  No distress  HENT:   Head: Normocephalic and atraumatic  Neck: Normal range of motion  Neck supple  No thyromegaly present  Cardiovascular: Normal rate, regular rhythm and intact distal pulses  Exam reveals no gallop and no friction rub  Murmur heard  Systolic murmur is present with a grade of 4/6  R second intercostal space murmur   Pulmonary/Chest: Effort normal and breath sounds normal  No respiratory distress  She has no wheezes  She has no rales  She exhibits no tenderness  Abdominal: Soft  Bowel sounds are normal  She exhibits no distension and no mass  There is no tenderness  There is no rebound and no guarding  Musculoskeletal: Normal range of motion  She exhibits no edema or deformity  Lumbar back: She exhibits tenderness  She exhibits normal range of motion     Paraspinal tenderness more prominent on the R side  Negative straight leg raise test   Lymphadenopathy:     She has no cervical adenopathy  Neurological: She is alert and oriented to person, place, and time  Skin: Skin is warm and dry  No rash noted  She is not diaphoretic  No erythema  Psychiatric: She has a normal mood and affect  Her behavior is normal  Judgment and thought content normal    Vitals reviewed

## 2020-07-21 NOTE — ASSESSMENT & PLAN NOTE
Chronic  -Patient reports tylenol is not helping with pain    -Robaxin previously prescribed not covered by insurance  Recommended avoiding muscle relaxants specially since she is taking Xanax BID for anxiety for many years, which can have sedative effects and respiratory depression    -OMT referral for back pain given, consider physical therapy if pain does not improve

## 2020-07-30 DIAGNOSIS — I10 HYPERTENSION, UNSPECIFIED TYPE: ICD-10-CM

## 2020-07-30 RX ORDER — LISINOPRIL AND HYDROCHLOROTHIAZIDE 20; 12.5 MG/1; MG/1
TABLET ORAL
Qty: 90 TABLET | Refills: 4 | Status: SHIPPED | OUTPATIENT
Start: 2020-07-30 | End: 2021-03-16

## 2020-09-25 DIAGNOSIS — I10 HYPERTENSION, UNSPECIFIED TYPE: ICD-10-CM

## 2020-09-26 RX ORDER — DILTIAZEM HYDROCHLORIDE 240 MG/1
240 CAPSULE, EXTENDED RELEASE ORAL DAILY
Qty: 90 CAPSULE | Refills: 1 | Status: SHIPPED | OUTPATIENT
Start: 2020-09-26 | End: 2021-03-16 | Stop reason: SDUPTHER

## 2020-11-06 ENCOUNTER — TELEPHONE (OUTPATIENT)
Dept: FAMILY MEDICINE CLINIC | Facility: CLINIC | Age: 75
End: 2020-11-06

## 2020-11-24 DIAGNOSIS — K21.9 GASTROESOPHAGEAL REFLUX DISEASE: ICD-10-CM

## 2020-11-24 RX ORDER — OMEPRAZOLE 20 MG/1
CAPSULE, DELAYED RELEASE ORAL
Qty: 30 CAPSULE | Refills: 1 | Status: SHIPPED | OUTPATIENT
Start: 2020-11-24 | End: 2020-11-24 | Stop reason: SDUPTHER

## 2020-11-25 RX ORDER — OMEPRAZOLE 20 MG/1
20 CAPSULE, DELAYED RELEASE ORAL DAILY
Qty: 30 CAPSULE | Refills: 5 | Status: SHIPPED | OUTPATIENT
Start: 2020-11-25 | End: 2021-03-16

## 2020-12-08 ENCOUNTER — OFFICE VISIT (OUTPATIENT)
Dept: GASTROENTEROLOGY | Facility: CLINIC | Age: 75
End: 2020-12-08
Payer: COMMERCIAL

## 2020-12-08 VITALS
BODY MASS INDEX: 23.69 KG/M2 | TEMPERATURE: 96.7 F | SYSTOLIC BLOOD PRESSURE: 144 MMHG | DIASTOLIC BLOOD PRESSURE: 81 MMHG | WEIGHT: 138 LBS | HEART RATE: 74 BPM

## 2020-12-08 DIAGNOSIS — Z12.11 SCREENING FOR COLON CANCER: ICD-10-CM

## 2020-12-08 DIAGNOSIS — K21.9 GASTROESOPHAGEAL REFLUX DISEASE, UNSPECIFIED WHETHER ESOPHAGITIS PRESENT: Primary | ICD-10-CM

## 2020-12-08 PROCEDURE — 3079F DIAST BP 80-89 MM HG: CPT | Performed by: INTERNAL MEDICINE

## 2020-12-08 PROCEDURE — 1160F RVW MEDS BY RX/DR IN RCRD: CPT | Performed by: INTERNAL MEDICINE

## 2020-12-08 PROCEDURE — 99204 OFFICE O/P NEW MOD 45 MIN: CPT | Performed by: INTERNAL MEDICINE

## 2020-12-08 PROCEDURE — 1036F TOBACCO NON-USER: CPT | Performed by: INTERNAL MEDICINE

## 2020-12-08 PROCEDURE — 3077F SYST BP >= 140 MM HG: CPT | Performed by: INTERNAL MEDICINE

## 2020-12-08 RX ORDER — POLYETHYLENE GLYCOL 3350 17 G/17G
POWDER, FOR SOLUTION ORAL
Qty: 238 G | Refills: 0 | Status: SHIPPED | OUTPATIENT
Start: 2020-12-08 | End: 2021-06-15

## 2021-01-21 DIAGNOSIS — K59.09 OTHER CONSTIPATION: ICD-10-CM

## 2021-01-21 RX ORDER — DOCUSATE SODIUM 100 MG/1
CAPSULE, LIQUID FILLED ORAL
Qty: 60 CAPSULE | Refills: 3 | OUTPATIENT
Start: 2021-01-21

## 2021-01-27 ENCOUNTER — TELEPHONE (OUTPATIENT)
Dept: GASTROENTEROLOGY | Facility: CLINIC | Age: 76
End: 2021-01-27

## 2021-01-29 DIAGNOSIS — K59.09 OTHER CONSTIPATION: ICD-10-CM

## 2021-01-29 RX ORDER — DOCUSATE SODIUM 100 MG/1
100 CAPSULE, LIQUID FILLED ORAL 2 TIMES DAILY
Qty: 60 CAPSULE | Refills: 5 | Status: SHIPPED | OUTPATIENT
Start: 2021-01-29 | End: 2021-03-16

## 2021-02-04 ENCOUNTER — TELEPHONE (OUTPATIENT)
Dept: FAMILY MEDICINE CLINIC | Facility: CLINIC | Age: 76
End: 2021-02-04

## 2021-02-15 DIAGNOSIS — E78.00 HYPERCHOLESTEROLEMIA: ICD-10-CM

## 2021-02-15 RX ORDER — ATORVASTATIN CALCIUM 40 MG/1
TABLET, FILM COATED ORAL
Qty: 90 TABLET | Refills: 1 | Status: SHIPPED | OUTPATIENT
Start: 2021-02-15 | End: 2021-08-16

## 2021-02-25 ENCOUNTER — TELEPHONE (OUTPATIENT)
Dept: FAMILY MEDICINE CLINIC | Facility: CLINIC | Age: 76
End: 2021-02-25

## 2021-02-25 NOTE — TELEPHONE ENCOUNTER
Pt missed her appointment on 2/25 but came in and r/s for 3/16 but will need a refill on her lipitor until her visit [facility] on 3rd

## 2021-03-16 ENCOUNTER — OFFICE VISIT (OUTPATIENT)
Dept: FAMILY MEDICINE CLINIC | Facility: CLINIC | Age: 76
End: 2021-03-16

## 2021-03-16 VITALS
RESPIRATION RATE: 14 BRPM | DIASTOLIC BLOOD PRESSURE: 80 MMHG | BODY MASS INDEX: 23.76 KG/M2 | WEIGHT: 139.2 LBS | HEIGHT: 64 IN | HEART RATE: 72 BPM | TEMPERATURE: 97.3 F | SYSTOLIC BLOOD PRESSURE: 150 MMHG

## 2021-03-16 DIAGNOSIS — K21.9 GASTROESOPHAGEAL REFLUX DISEASE, UNSPECIFIED WHETHER ESOPHAGITIS PRESENT: ICD-10-CM

## 2021-03-16 DIAGNOSIS — I10 ESSENTIAL HYPERTENSION: Primary | ICD-10-CM

## 2021-03-16 DIAGNOSIS — I10 HYPERTENSION, UNSPECIFIED TYPE: ICD-10-CM

## 2021-03-16 DIAGNOSIS — E78.00 HYPERCHOLESTEROLEMIA: ICD-10-CM

## 2021-03-16 DIAGNOSIS — N95.2 VAGINAL ATROPHY: ICD-10-CM

## 2021-03-16 DIAGNOSIS — K21.9 GASTROESOPHAGEAL REFLUX DISEASE: ICD-10-CM

## 2021-03-16 PROCEDURE — 3725F SCREEN DEPRESSION PERFORMED: CPT | Performed by: FAMILY MEDICINE

## 2021-03-16 PROCEDURE — 1036F TOBACCO NON-USER: CPT | Performed by: FAMILY MEDICINE

## 2021-03-16 PROCEDURE — 3079F DIAST BP 80-89 MM HG: CPT | Performed by: FAMILY MEDICINE

## 2021-03-16 PROCEDURE — 3077F SYST BP >= 140 MM HG: CPT | Performed by: FAMILY MEDICINE

## 2021-03-16 PROCEDURE — 1101F PT FALLS ASSESS-DOCD LE1/YR: CPT | Performed by: FAMILY MEDICINE

## 2021-03-16 PROCEDURE — 99213 OFFICE O/P EST LOW 20 MIN: CPT | Performed by: FAMILY MEDICINE

## 2021-03-16 PROCEDURE — 3288F FALL RISK ASSESSMENT DOCD: CPT | Performed by: FAMILY MEDICINE

## 2021-03-16 PROCEDURE — 1160F RVW MEDS BY RX/DR IN RCRD: CPT | Performed by: FAMILY MEDICINE

## 2021-03-16 RX ORDER — LISINOPRIL AND HYDROCHLOROTHIAZIDE 20; 12.5 MG/1; MG/1
1 TABLET ORAL DAILY
Qty: 90 TABLET | Refills: 1 | Status: SHIPPED | OUTPATIENT
Start: 2021-03-16 | End: 2021-11-09 | Stop reason: SDUPTHER

## 2021-03-16 RX ORDER — CLOBETASOL PROPIONATE 0.5 MG/G
1 CREAM TOPICAL 2 TIMES DAILY
Qty: 30 G | Refills: 0 | Status: SHIPPED | OUTPATIENT
Start: 2021-03-16

## 2021-03-16 RX ORDER — DILTIAZEM HYDROCHLORIDE 240 MG/1
240 CAPSULE, EXTENDED RELEASE ORAL DAILY
Qty: 90 CAPSULE | Refills: 1 | Status: SHIPPED | OUTPATIENT
Start: 2021-03-16 | End: 2022-03-24 | Stop reason: SDUPTHER

## 2021-03-16 RX ORDER — OMEPRAZOLE 20 MG/1
20 CAPSULE, DELAYED RELEASE ORAL DAILY
Qty: 60 CAPSULE | Refills: 1 | Status: SHIPPED | OUTPATIENT
Start: 2021-03-16 | End: 2021-05-03

## 2021-03-16 NOTE — ASSESSMENT & PLAN NOTE
Blood Pressure: 150/80   Borderline  -Continue lisinopril/hctz 20-12 5 mg daily and diltiazem 240 mg daily  -Microalbumin/Cr ratio and BMP ordered at this time  -RTC in 3 months

## 2021-03-16 NOTE — ASSESSMENT & PLAN NOTE
Stable  -Patient reports taking Prilosec 20 mg daily for several years  -EGD and colonoscopy scheduled last year  Patient couldn't attend due to illness at the time  -She follows with Dr Louis Werner, GI  Advised to schedule both procedures  Discussed that long term PPI use can increase her risk of fx and infections   She showed understanding

## 2021-03-16 NOTE — PROGRESS NOTES
Assessment/Plan:    Hypertension  Blood Pressure: 150/80   Borderline  -Continue lisinopril/hctz 20-12 5 mg daily and diltiazem 240 mg daily  -Microalbumin/Cr ratio and BMP ordered at this time  -RTC in 3 months      GERD (gastroesophageal reflux disease)  Stable  -Patient reports taking Prilosec 20 mg daily for several years  -EGD and colonoscopy scheduled last year  Patient couldn't attend due to illness at the time  -She follows with Dr Laureano Posey GI  Advised to schedule both procedures  Discussed that long term PPI use can increase her risk of fx and infections  She showed understanding         Problem List Items Addressed This Visit        Digestive    GERD (gastroesophageal reflux disease)     Stable  -Patient reports taking Prilosec 20 mg daily for several years  -EGD and colonoscopy scheduled last year  Patient couldn't attend due to illness at the time  -She follows with Dr Laureano Posey GI  Advised to schedule both procedures  Discussed that long term PPI use can increase her risk of fx and infections   She showed understanding         Relevant Medications    omeprazole (PriLOSEC) 20 mg delayed release capsule       Cardiovascular and Mediastinum    Hypertension - Primary     Blood Pressure: 150/80   Borderline  -Continue lisinopril/hctz 20-12 5 mg daily and diltiazem 240 mg daily  -Microalbumin/Cr ratio and BMP ordered at this time  -RTC in 3 months           Relevant Medications    diltiazem (Dilt-XR) 240 MG 24 hr capsule    lisinopril-hydrochlorothiazide (PRINZIDE,ZESTORETIC) 20-12 5 MG per tablet    Other Relevant Orders    Basic metabolic panel    Microalbumin / creatinine urine ratio       Genitourinary    Vaginal atrophy    Relevant Medications    clobetasol (TEMOVATE) 0 05 % cream       Other    Hypercholesterolemia    Relevant Orders    Lipid panel      Other Visit Diagnoses     Gastroesophageal reflux disease        Relevant Medications    omeprazole (PriLOSEC) 20 mg delayed release capsule Subjective:      Patient ID: Nory Nogueira is a 76 y o  female  HPI     Patient is a 75 yo F who presents to the office to follow up hypertension  She reports she got 1st dose of COVID-19 vaccine and she will receive booster on 3/30  She reports mild headache and shoulder pain after vaccine  The following portions of the patient's history were reviewed and updated as appropriate:   She  has a past medical history of Arthritis, Chest pain, Edema of lower extremity, Esophageal reflux, cardiac cath, and Hypertension  She  has a past surgical history that includes Bladder surgery  She  reports that she has never smoked  She has never used smokeless tobacco  She reports that she does not drink alcohol or use drugs  Current Outpatient Medications   Medication Sig Dispense Refill    ALPRAZolam (XANAX) 0 25 mg tablet Take 1 tablet by mouth 2 (two) times a day      atorvastatin (LIPITOR) 40 mg tablet take 1 tablet by mouth once daily 90 tablet 1    diltiazem (Dilt-XR) 240 MG 24 hr capsule Take 1 capsule (240 mg total) by mouth daily 90 capsule 1    Docusate Sodium (COLACE PO) Take 200 mg by mouth      lisinopril-hydrochlorothiazide (PRINZIDE,ZESTORETIC) 20-12 5 MG per tablet Take 1 tablet by mouth daily 90 tablet 1    omeprazole (PriLOSEC) 20 mg delayed release capsule Take 1 capsule (20 mg total) by mouth daily 60 capsule 1    TOVIAZ 8 MG TB24   0    clobetasol (TEMOVATE) 0 05 % cream Apply 5 g (1 application total) topically 2 (two) times a day 30 g 0    FLUAD 0 5 ML BERNABE inject 0 5 milliliter intramuscularly  0    FLUoxetine (PROzac) 20 mg capsule   0    hydrocortisone 2 5 % cream Apply 1 application topically 2 (two) times a day  0    meclizine (ANTIVERT) 12 5 MG tablet Take 1 tablet (12 5 mg total) by mouth every 8 (eight) hours as needed for dizziness (Patient not taking: Reported on 12/8/2020) 30 tablet 0    polyethylene glycol (GLYCOLAX) 17 GM/SCOOP powder At 5pm take 5mgx2 dulcolax  At 6pm 32oz miralax in 64oz gatorade  Drink 8oz glass every 5 mins until 32oz finished  Drink remaining as rec  (Patient not taking: Reported on 3/16/2021) 238 g 0     No current facility-administered medications for this visit  She has No Known Allergies       Review of Systems   Constitutional: Negative for fever  Respiratory: Negative for cough and shortness of breath  Cardiovascular: Negative for chest pain and palpitations  Neurological: Positive for light-headedness  Psychiatric/Behavioral: The patient is not nervous/anxious  Objective:      /80 (BP Location: Left arm, Patient Position: Sitting, Cuff Size: Standard)   Pulse 72   Temp (!) 97 3 °F (36 3 °C) (Temporal)   Resp 14   Ht 5' 4" (1 626 m)   Wt 63 1 kg (139 lb 3 2 oz)   BMI 23 89 kg/m²          Physical Exam  Vitals signs reviewed  Constitutional:       General: She is not in acute distress  Appearance: She is well-developed  She is not diaphoretic  HENT:      Head: Normocephalic and atraumatic  Eyes:      Pupils: Pupils are equal, round, and reactive to light  Neck:      Musculoskeletal: Normal range of motion and neck supple  Thyroid: No thyromegaly  Cardiovascular:      Rate and Rhythm: Normal rate and regular rhythm  Heart sounds: Murmur (2nd intercostal space) present  Systolic murmur present with a grade of 4/6  No friction rub  No gallop  Pulmonary:      Effort: Pulmonary effort is normal  No respiratory distress  Breath sounds: Normal breath sounds  No wheezing or rales  Chest:      Chest wall: No tenderness  Abdominal:      General: Bowel sounds are normal  There is no distension  Palpations: Abdomen is soft  Tenderness: There is no abdominal tenderness  Musculoskeletal: Normal range of motion  Right lower leg: No edema  Left lower leg: No edema  Lymphadenopathy:      Cervical: No cervical adenopathy  Skin:     General: Skin is warm and dry  Capillary Refill: Capillary refill takes less than 2 seconds  Findings: No rash  Neurological:      Mental Status: She is alert and oriented to person, place, and time  Mental status is at baseline     Psychiatric:         Mood and Affect: Mood normal          Behavior: Behavior normal

## 2021-03-23 ENCOUNTER — APPOINTMENT (OUTPATIENT)
Dept: LAB | Facility: HOSPITAL | Age: 76
End: 2021-03-23
Payer: COMMERCIAL

## 2021-03-23 DIAGNOSIS — E78.00 HYPERCHOLESTEROLEMIA: ICD-10-CM

## 2021-03-23 DIAGNOSIS — I10 ESSENTIAL HYPERTENSION: ICD-10-CM

## 2021-03-23 LAB
ANION GAP SERPL CALCULATED.3IONS-SCNC: 3 MMOL/L (ref 4–13)
BUN SERPL-MCNC: 13 MG/DL (ref 5–25)
CALCIUM SERPL-MCNC: 9 MG/DL (ref 8.3–10.1)
CHLORIDE SERPL-SCNC: 105 MMOL/L (ref 100–108)
CHOLEST SERPL-MCNC: 137 MG/DL (ref 50–200)
CO2 SERPL-SCNC: 30 MMOL/L (ref 21–32)
CREAT SERPL-MCNC: 0.83 MG/DL (ref 0.6–1.3)
CREAT UR-MCNC: 80.8 MG/DL
GFR SERPL CREATININE-BSD FRML MDRD: 69 ML/MIN/1.73SQ M
GLUCOSE P FAST SERPL-MCNC: 87 MG/DL (ref 65–99)
HDLC SERPL-MCNC: 53 MG/DL
LDLC SERPL CALC-MCNC: 62 MG/DL (ref 0–100)
MICROALBUMIN UR-MCNC: 8.8 MG/L (ref 0–20)
MICROALBUMIN/CREAT 24H UR: 11 MG/G CREATININE (ref 0–30)
NONHDLC SERPL-MCNC: 84 MG/DL
POTASSIUM SERPL-SCNC: 3.2 MMOL/L (ref 3.5–5.3)
SODIUM SERPL-SCNC: 138 MMOL/L (ref 136–145)
TRIGL SERPL-MCNC: 111 MG/DL

## 2021-03-23 PROCEDURE — 82043 UR ALBUMIN QUANTITATIVE: CPT | Performed by: FAMILY MEDICINE

## 2021-03-23 PROCEDURE — 80048 BASIC METABOLIC PNL TOTAL CA: CPT

## 2021-03-23 PROCEDURE — 82570 ASSAY OF URINE CREATININE: CPT | Performed by: FAMILY MEDICINE

## 2021-03-23 PROCEDURE — 36415 COLL VENOUS BLD VENIPUNCTURE: CPT

## 2021-03-23 PROCEDURE — 80061 LIPID PANEL: CPT

## 2021-03-30 ENCOUNTER — TELEPHONE (OUTPATIENT)
Dept: FAMILY MEDICINE CLINIC | Facility: CLINIC | Age: 76
End: 2021-03-30

## 2021-04-01 DIAGNOSIS — I10 ESSENTIAL HYPERTENSION: ICD-10-CM

## 2021-04-01 DIAGNOSIS — E87.6 HYPOKALEMIA: Primary | ICD-10-CM

## 2021-05-03 ENCOUNTER — OFFICE VISIT (OUTPATIENT)
Dept: FAMILY MEDICINE CLINIC | Facility: CLINIC | Age: 76
End: 2021-05-03

## 2021-05-03 VITALS
HEIGHT: 61 IN | TEMPERATURE: 98 F | BODY MASS INDEX: 26.01 KG/M2 | DIASTOLIC BLOOD PRESSURE: 80 MMHG | SYSTOLIC BLOOD PRESSURE: 150 MMHG | WEIGHT: 137.8 LBS | HEART RATE: 80 BPM | RESPIRATION RATE: 16 BRPM

## 2021-05-03 DIAGNOSIS — Z91.81 AT RISK FOR FALLS: ICD-10-CM

## 2021-05-03 DIAGNOSIS — Z00.00 WELCOME TO MEDICARE PREVENTIVE VISIT: Primary | ICD-10-CM

## 2021-05-03 DIAGNOSIS — K21.9 GASTROESOPHAGEAL REFLUX DISEASE: ICD-10-CM

## 2021-05-03 DIAGNOSIS — R13.19 ESOPHAGEAL DYSPHAGIA: ICD-10-CM

## 2021-05-03 DIAGNOSIS — K21.9 GASTROESOPHAGEAL REFLUX DISEASE, UNSPECIFIED WHETHER ESOPHAGITIS PRESENT: ICD-10-CM

## 2021-05-03 PROCEDURE — 99213 OFFICE O/P EST LOW 20 MIN: CPT | Performed by: FAMILY MEDICINE

## 2021-05-03 PROCEDURE — G0439 PPPS, SUBSEQ VISIT: HCPCS | Performed by: FAMILY MEDICINE

## 2021-05-03 RX ORDER — OMEPRAZOLE 40 MG/1
40 CAPSULE, DELAYED RELEASE ORAL DAILY
Qty: 60 CAPSULE | Refills: 0 | Status: SHIPPED | OUTPATIENT
Start: 2021-05-03 | End: 2021-09-08

## 2021-05-03 NOTE — ASSESSMENT & PLAN NOTE
Symptoms of difficulty swallowing with reflux and weight loss for the past several months    Referral to GI to rule out possible malignancy vs other structural disorder

## 2021-05-03 NOTE — ASSESSMENT & PLAN NOTE
Patient here for MAW  -Patient BP at goal for age  -Appropriate screenings reviewed with patient  She will schedule appointment with GI for colonoscopy since she never had one done in the past  If negative she does not need to pursue further testing  -Increased risk of falls  Patient reports having problem going upstairs and feels more deconditioned  Ambulatory referral to PT given at this time

## 2021-05-03 NOTE — PATIENT INSTRUCTIONS
Medicare Preventive Visit Patient Instructions  Thank you for completing your Welcome to Medicare Visit or Medicare Annual Wellness Visit today  Your next wellness visit will be due in one year (5/4/2022)  The screening/preventive services that you may require over the next 5-10 years are detailed below  Some tests may not apply to you based off risk factors and/or age  Screening tests ordered at today's visit but not completed yet may show as past due  Also, please note that scanned in results may not display below  Preventive Screenings:  Service Recommendations Previous Testing/Comments   Colorectal Cancer Screening  * Colonoscopy    * Fecal Occult Blood Test (FOBT)/Fecal Immunochemical Test (FIT)  * Fecal DNA/Cologuard Test  * Flexible Sigmoidoscopy Age: 54-65 years old   Colonoscopy: every 10 years (may be performed more frequently if at higher risk)  OR  FOBT/FIT: every 1 year  OR  Cologuard: every 3 years  OR  Sigmoidoscopy: every 5 years  Screening may be recommended earlier than age 48 if at higher risk for colorectal cancer  Also, an individualized decision between you and your healthcare provider will decide whether screening between the ages of 74-80 would be appropriate  Colonoscopy: Not on file  FOBT/FIT: Not on file  Cologuard: Not on file  Sigmoidoscopy: Not on file          Breast Cancer Screening Age: 36 years old  Frequency: every 1-2 years  Not required if history of left and right mastectomy Mammogram: Not on file        Cervical Cancer Screening Between the ages of 21-29, pap smear recommended once every 3 years  Between the ages of 33-67, can perform pap smear with HPV co-testing every 5 years     Recommendations may differ for women with a history of total hysterectomy, cervical cancer, or abnormal pap smears in past  Pap Smear: Not on file        Hepatitis C Screening Once for adults born between [facility]  More frequently in patients at high risk for Hepatitis C Hep C Antibody: 04/17/2018        Diabetes Screening 1-2 times per year if you're at risk for diabetes or have pre-diabetes Fasting glucose: 87 mg/dL   A1C: 5 5 %        Cholesterol Screening Once every 5 years if you don't have a lipid disorder  May order more often based on risk factors  Lipid panel: 03/23/2021          Other Preventive Screenings Covered by Medicare:  1  Abdominal Aortic Aneurysm (AAA) Screening: covered once if your at risk  You're considered to be at risk if you have a family history of AAA  2  Lung Cancer Screening: covers low dose CT scan once per year if you meet all of the following conditions: (1) Age 50-69; (2) No signs or symptoms of lung cancer; (3) Current smoker or have quit smoking within the last 15 years; (4) You have a tobacco smoking history of at least 30 pack years (packs per day multiplied by number of years you smoked); (5) You get a written order from a healthcare provider  3  Glaucoma Screening: covered annually if you're considered high risk: (1) You have diabetes OR (2) Family history of glaucoma OR (3)  aged 48 and older OR (3)  American aged 72 and older  3  Osteoporosis Screening: covered every 2 years if you meet one of the following conditions: (1) You're estrogen deficient and at risk for osteoporosis based off medical history and other findings; (2) Have a vertebral abnormality; (3) On glucocorticoid therapy for more than 3 months; (4) Have primary hyperparathyroidism; (5) On osteoporosis medications and need to assess response to drug therapy  · Last bone density test (DXA Scan): 02/15/2017  5  HIV Screening: covered annually if you're between the age of 12-76  Also covered annually if you are younger than 13 and older than 72 with risk factors for HIV infection  For pregnant patients, it is covered up to 3 times per pregnancy      Immunizations:  Immunization Recommendations   Influenza Vaccine Annual influenza vaccination during flu season is recommended for all persons aged >= 6 months who do not have contraindications   Pneumococcal Vaccine (Prevnar and Pneumovax)  * Prevnar = PCV13  * Pneumovax = PPSV23   Adults 25-60 years old: 1-3 doses may be recommended based on certain risk factors  Adults 72 years old: Prevnar (PCV13) vaccine recommended followed by Pneumovax (PPSV23) vaccine  If already received PPSV23 since turning 65, then PCV13 recommended at least one year after PPSV23 dose  Hepatitis B Vaccine 3 dose series if at intermediate or high risk (ex: diabetes, end stage renal disease, liver disease)   Tetanus (Td) Vaccine - COST NOT COVERED BY MEDICARE PART B Following completion of primary series, a booster dose should be given every 10 years to maintain immunity against tetanus  Td may also be given as tetanus wound prophylaxis  Tdap Vaccine - COST NOT COVERED BY MEDICARE PART B Recommended at least once for all adults  For pregnant patients, recommended with each pregnancy  Shingles Vaccine (Shingrix) - COST NOT COVERED BY MEDICARE PART B  2 shot series recommended in those aged 48 and above     Health Maintenance Due:      Topic Date Due    Colorectal Cancer Screening  Never done    Hepatitis C Screening  Completed     Immunizations Due:      Topic Date Due    Pneumococcal Vaccine: 65+ Years (2 of 2 - PPSV23) 05/23/2020    COVID-19 Vaccine (2 - Moderna 2-dose series) 03/30/2021     Advance Directives   What are advance directives? Advance directives are legal documents that state your wishes and plans for medical care  These plans are made ahead of time in case you lose your ability to make decisions for yourself  Advance directives can apply to any medical decision, such as the treatments you want, and if you want to donate organs  What are the types of advance directives? There are many types of advance directives, and each state has rules about how to use them   You may choose a combination of any of the following:  · Living will:  This is a written record of the treatment you want  You can also choose which treatments you do not want, which to limit, and which to stop at a certain time  This includes surgery, medicine, IV fluid, and tube feedings  · Durable power of  for healthcare [place] [facility]): This is a written record that states who you want to make healthcare choices for you when you are unable to make them for yourself  This person, called a proxy, is usually a family member or a friend  You may choose more than 1 proxy  · Do not resuscitate (DNR) order:  A DNR order is used in case your heart stops beating or you stop breathing  It is a request not to have certain forms of treatment, such as CPR  A DNR order may be included in other types of advance directives  · Medical directive: This covers the care that you want if you are in a coma, near death, or unable to make decisions for yourself  You can list the treatments you want for each condition  Treatment may include pain medicine, surgery, blood transfusions, dialysis, IV or tube feedings, and a ventilator (breathing machine)  · Values history: This document has questions about your views, beliefs, and how you feel and think about life  This information can help others choose the care that you would choose  Why are advance directives important? An advance directive helps you control your care  Although spoken wishes may be used, it is better to have your wishes written down  Spoken wishes can be misunderstood, or not followed  Treatments may be given even if you do not want them  An advance directive may make it easier for your family to make difficult choices about your care  Weight Management   Why it is important to manage your weight:  Being overweight increases your risk of health conditions such as heart disease, high blood pressure, type 2 diabetes, and certain types of cancer   It can also increase your risk for osteoarthritis, sleep apnea, and other respiratory problems  Aim for a slow, steady weight loss  Even a small amount of weight loss can lower your risk of health problems  How to lose weight safely:  A safe and healthy way to lose weight is to eat fewer calories and get regular exercise  You can lose up about 1 pound a week by decreasing the number of calories you eat by 500 calories each day  Healthy meal plan for weight management:  A healthy meal plan includes a variety of foods, contains fewer calories, and helps you stay healthy  A healthy meal plan includes the following:  · Eat whole-grain foods more often  A healthy meal plan should contain fiber  Fiber is the part of grains, fruits, and vegetables that is not broken down by your body  Whole-grain foods are healthy and provide extra fiber in your diet  Some examples of whole-grain foods are whole-wheat breads and pastas, oatmeal, brown rice, and bulgur  · Eat a variety of vegetables every day  Include dark, leafy greens such as spinach, kale, bailey greens, and mustard greens  Eat yellow and orange vegetables such as carrots, sweet potatoes, and winter squash  · Eat a variety of fruits every day  Choose fresh or canned fruit (canned in its own juice or light syrup) instead of juice  Fruit juice has very little or no fiber  · Eat low-fat dairy foods  Drink fat-free (skim) milk or 1% milk  Eat fat-free yogurt and low-fat cottage cheese  Try low-fat cheeses such as mozzarella and other reduced-fat cheeses  · Choose meat and other protein foods that are low in fat  Choose beans or other legumes such as split peas or lentils  Choose fish, skinless poultry (chicken or turkey), or lean cuts of red meat (beef or pork)  Before you cook meat or poultry, cut off any visible fat  · Use less fat and oil  Try baking foods instead of frying them  Add less fat, such as margarine, sour cream, regular salad dressing and mayonnaise to foods  Eat fewer high-fat foods   Some examples of high-fat foods include french fries, doughnuts, ice cream, and cakes  · Eat fewer sweets  Limit foods and drinks that are high in sugar  This includes candy, cookies, regular soda, and sweetened drinks  Exercise:  Exercise at least 30 minutes per day on most days of the week  Some examples of exercise include walking, biking, dancing, and swimming  You can also fit in more physical activity by taking the stairs instead of the elevator or parking farther away from stores  Ask your healthcare provider about the best exercise plan for you  © Copyright [employer] 2018 Information is for End User's use only and may not be sold, redistributed or otherwise used for commercial purposes   All illustrations and images included in CareNotes® are the copyrighted property of A D A M , [employer]  or [address] [place], [place] [address]rayne chichi

## 2021-05-03 NOTE — ASSESSMENT & PLAN NOTE
Symptoms of swallowing difficulty and dysphagia likely consistent with GERD  -Patient was advised on lifestyle changes such as avoiding highly acidic foods, spicy foods, coffee and chocolate  She was advised not to eat within 2 hours of bedtime and to elevate the head of the bed while sleeping     -Will increase omeprazole 20 to 40 mg daily  Follow up after visit with GI or sooner as needed for symptoms  -GI referral for possible EGD and r/o malignancy given at this time  Also patient due for colon cancer screening

## 2021-05-03 NOTE — PROGRESS NOTES
Assessment/Plan:    Esophageal dysphagia  Symptoms of difficulty swallowing with reflux and weight loss for the past several months  Referral to GI to rule out possible malignancy vs other structural disorder    GERD (gastroesophageal reflux disease)  Symptoms of swallowing difficulty and dysphagia likely consistent with GERD  -Patient was advised on lifestyle changes such as avoiding highly acidic foods, spicy foods, coffee and chocolate  She was advised not to eat within 2 hours of bedtime and to elevate the head of the bed while sleeping     -Will increase omeprazole 20 to 40 mg daily  Follow up after visit with GI or sooner as needed for symptoms  -GI referral for possible EGD and r/o malignancy given at this time  Also patient due for colon cancer screening  Welcome to Medicare preventive visit  Patient here for MAW  -Patient BP at goal for age  -Appropriate screenings reviewed with patient  She will schedule appointment with GI for colonoscopy since she never had one done in the past  If negative she does not need to pursue further testing  -Increased risk of falls  Patient reports having problem going upstairs and feels more deconditioned  Ambulatory referral to PT given at this time  Diagnoses and all orders for this visit:    Esophageal dysphagia  In the setting of weight loss will refer to GI to rule out malignancy vs other structural cause  -     Ambulatory referral to Gastroenterology; Future      Gastroesophageal reflux disease  Lifestyle modifications discussed with the patient  -     omeprazole (PriLOSEC) 40 MG capsule; Take 1 capsule (40 mg total) by mouth daily    At risk for falls  Patient describes some difficulty with fatigue and stairs  Discussed how PT could help her with deconditioning to prevent falls  -     Ambulatory referral to Physical Therapy; Future        Subjective:      Patient ID: Dann Nunn is a 76 y o  female      Ms Lamont Johns notes that for the past several months she has been having difficulty with swallowing solids  She describes the sensation of feeling like food gets stuck in her chest   When she drinks some water to help the food go down she feels like it refluxes up from her stomach  Additionally she has had some coughing and shortness of breath when she has these episodes  She notes about a 10 lb weight loss over the past 6 months  She is denying any pain on swallowing, fever, nausea/vomiting, or voice changes  She is currently taking pantoprazole 20 mg which does not make the symptoms better and she has not tried anything else for it  She notes that she must remain upright after eating, because laying down makes her symptoms worse  The following portions of the patient's history were reviewed and updated as appropriate:   She  has a past medical history of Arthritis, Chest pain, Edema of lower extremity, Esophageal reflux, cardiac cath, and Hypertension  She  has a past surgical history that includes Bladder surgery  She  reports that she has never smoked  She has never used smokeless tobacco  She reports that she does not drink alcohol or use drugs    Current Outpatient Medications   Medication Sig Dispense Refill    ALPRAZolam (XANAX) 0 25 mg tablet Take 1 tablet by mouth 2 (two) times a day      atorvastatin (LIPITOR) 40 mg tablet take 1 tablet by mouth once daily 90 tablet 1    clobetasol (TEMOVATE) 0 05 % cream Apply 5 g (1 application total) topically 2 (two) times a day 30 g 0    diltiazem (Dilt-XR) 240 MG 24 hr capsule Take 1 capsule (240 mg total) by mouth daily 90 capsule 1    Docusate Sodium (COLACE PO) Take 200 mg by mouth      FLUAD 0 5 ML BERNABE inject 0 5 milliliter intramuscularly  0    FLUoxetine (PROzac) 20 mg capsule   0    hydrocortisone 2 5 % cream Apply 1 application topically 2 (two) times a day  0    lisinopril-hydrochlorothiazide (PRINZIDE,ZESTORETIC) 20-12 5 MG per tablet Take 1 tablet by mouth daily 90 tablet 1    meclizine (ANTIVERT) 12 5 MG tablet Take 1 tablet (12 5 mg total) by mouth every 8 (eight) hours as needed for dizziness (Patient not taking: Reported on 12/8/2020) 30 tablet 0    omeprazole (PriLOSEC) 40 MG capsule Take 1 capsule (40 mg total) by mouth daily 60 capsule 0    polyethylene glycol (GLYCOLAX) 17 GM/SCOOP powder At 5pm take 5mgx2 dulcolax  At 6pm 32oz miralax in 64oz gatorade  Drink 8oz glass every 5 mins until 32oz finished  Drink remaining as rec  (Patient not taking: Reported on 3/16/2021) 238 g 0    TOVIAZ 8 MG TB24   0     No current facility-administered medications for this visit  She has No Known Allergies       Review of Systems   Constitutional: Positive for unexpected weight change  Negative for fatigue and fever  HENT: Positive for trouble swallowing  Negative for hearing loss, sore throat and voice change  Eyes: Positive for visual disturbance  Has had blurry vision for the past year  Planning to go see an eye doctor soon   Respiratory: Positive for cough  Negative for shortness of breath  Cough only with reflux episodes   Cardiovascular: Negative for chest pain  Gastrointestinal: Positive for abdominal pain  Negative for constipation, diarrhea, nausea and vomiting  Endocrine: Negative  Genitourinary: Negative  Musculoskeletal: Negative  Skin: Negative  Neurological: Positive for dizziness  Hematological: Negative  Psychiatric/Behavioral: Negative  Objective:      /80   Pulse 80   Temp 98 °F (36 7 °C) (Tympanic)   Resp 16   Ht 5' 1 4" (1 56 m)   Wt 62 5 kg (137 lb 12 8 oz)   BMI 25 70 kg/m²          Physical Exam  Constitutional:       Appearance: Normal appearance  HENT:      Head: Normocephalic and atraumatic  Right Ear: Tympanic membrane normal       Left Ear: Tympanic membrane normal       Mouth/Throat:      Mouth: Mucous membranes are moist       Pharynx: Oropharynx is clear   No posterior oropharyngeal erythema  Eyes:      Conjunctiva/sclera: Conjunctivae normal       Pupils: Pupils are equal, round, and reactive to light  Neck:      Musculoskeletal: No muscular tenderness  Cardiovascular:      Rate and Rhythm: Normal rate and regular rhythm  Pulses: Normal pulses  Heart sounds: Murmur present  Comments: Systolic 2/6 blowing murmur at right upper sternal border  Pulmonary:      Effort: Pulmonary effort is normal       Breath sounds: Normal breath sounds  Abdominal:      General: Bowel sounds are normal       Palpations: Abdomen is soft  Tenderness: There is abdominal tenderness  Comments: Epigastric tenderness   Musculoskeletal:         General: No swelling  Lymphadenopathy:      Cervical: No cervical adenopathy  Skin:     General: Skin is warm and dry  Neurological:      Mental Status: She is alert and oriented to person, place, and time  Psychiatric:         Mood and Affect: Mood normal          Behavior: Behavior normal          Thought Content:  Thought content normal          Judgment: Judgment normal

## 2021-05-03 NOTE — PROGRESS NOTES
Assessment and Plan:     Problem List Items Addressed This Visit        Digestive    GERD (gastroesophageal reflux disease)     Symptoms of swallowing difficulty and dysphagia likely consistent with GERD  GI referral to rule out alternative diagnosis  Patient was advised on lifestyle changes such as avoiding highly acidic foods, spicy foods, coffee and chocolate  She was advised not to eat within 2 hours of bedtime and to elevate the head of the bed while sleeping  Will prescribe pantoprazole 40 mg daily  Follow up after visit with GI or sooner as needed for symptoms         Relevant Medications    omeprazole (PriLOSEC) 40 MG capsule    Esophageal dysphagia     Symptoms of difficulty swallowing with reflux and weight loss for the past several months  Referral to GI to rule out possible malignancy vs other structural disorder         Relevant Medications    omeprazole (PriLOSEC) 40 MG capsule    Other Relevant Orders    Ambulatory referral to Gastroenterology       Other    Welcome to Medicare preventive visit - Primary     Patient here for MAW  -Patient BP at goal for age  -Appropriate screenings reviewed with patient  She will schedule appointment with GI for colonoscopy since she never had one done in the past  If negative she does not need to pursue further testing  -Increased risk of falls  Patient reports having problem going upstairs and feels more deconditioned  Ambulatory referral to PT given at this time  Other Visit Diagnoses     Gastroesophageal reflux disease        Relevant Medications    omeprazole (PriLOSEC) 40 MG capsule    At risk for falls        Relevant Orders    Ambulatory referral to Physical Therapy           Preventive health issues were discussed with patient, and age appropriate screening tests were ordered as noted in patient's After Visit Summary    Personalized health advice and appropriate referrals for health education or preventive services given if needed, as noted in patient's After Visit Summary       History of Present Illness:     Patient presents for Medicare Annual Wellness visit    Patient Care Team:  Kimberlee Harada, MD as PCP - General (Family Medicine)  MD Luzma Gatica MD     Problem List:     Patient Active Problem List   Diagnosis    Aortic valve calcification    Cerumen impaction    Dizziness    Hypercholesterolemia    Hypertension    Vitamin D deficiency    Spasm of lumbar paraspinous muscle    Dysuria    Urinary frequency    Suprapubic tenderness    Fall    Anxiety disorder    Depression    Heart murmur    Mixed incontinence    Other specified disorders of rotator cuff syndrome of shoulder and allied disorders    Segmental and somatic dysfunction    Contact dermatitis    Vaginal atrophy    Low back pain    Chronic right SI joint pain    Bilateral leg edema    Chest tightness    GERD (gastroesophageal reflux disease)    Hypokalemia    Elevated troponin level    Esophageal dysphagia    Welcome to Medicare preventive visit      Past Medical and Surgical History:     Past Medical History:   Diagnosis Date    Arthritis     Chest pain     Edema of lower extremity     Esophageal reflux     Hx of cardiac cath     Hypertension      Past Surgical History:   Procedure Laterality Date    BLADDER SURGERY        Family History:     Family History   Problem Relation Age of Onset    COPD Mother     Emphysema Mother       Social History:     E-Cigarette/Vaping    E-Cigarette Use Never User      E-Cigarette/Vaping Substances    Nicotine No     THC No     CBD No     Flavoring No     Other No     Unknown No      Social History     Socioeconomic History    Marital status: Single     Spouse name: None    Number of children: None    Years of education: None    Highest education level: None   Occupational History    None   Social Needs    Financial resource strain: Not hard at all   [address] insecurity Worry: Never true     Inability: Never true    Transportation needs     Medical: No     Non-medical: No   Tobacco Use    Smoking status: Never Smoker    Smokeless tobacco: Never Used   Substance and Sexual Activity    Alcohol use: No    Drug use: Never    Sexual activity: None   Lifestyle    Physical activity     Days per week: None     Minutes per session: None    Stress: None   Relationships    Social connections     Talks on phone: None     Gets together: None     Attends Confucianist service: None     Active member of club or organization: None     Attends meetings of clubs or organizations: None     Relationship status: None    Intimate partner violence     Fear of current or ex partner: None     Emotionally abused: None     Physically abused: None     Forced sexual activity: None   Other Topics Concern    None   Social History Narrative    Caffeine use    Tea use      Medications and Allergies:     Current Outpatient Medications   Medication Sig Dispense Refill    ALPRAZolam (XANAX) 0 25 mg tablet Take 1 tablet by mouth 2 (two) times a day      atorvastatin (LIPITOR) 40 mg tablet take 1 tablet by mouth once daily 90 tablet 1    clobetasol (TEMOVATE) 0 05 % cream Apply 5 g (1 application total) topically 2 (two) times a day 30 g 0    diltiazem (Dilt-XR) 240 MG 24 hr capsule Take 1 capsule (240 mg total) by mouth daily 90 capsule 1    Docusate Sodium (COLACE PO) Take 200 mg by mouth      FLUAD 0 5 ML BERNABE inject 0 5 milliliter intramuscularly  0    FLUoxetine (PROzac) 20 mg capsule   0    hydrocortisone 2 5 % cream Apply 1 application topically 2 (two) times a day  0    lisinopril-hydrochlorothiazide (PRINZIDE,ZESTORETIC) 20-12 5 MG per tablet Take 1 tablet by mouth daily 90 tablet 1    meclizine (ANTIVERT) 12 5 MG tablet Take 1 tablet (12 5 mg total) by mouth every 8 (eight) hours as needed for dizziness (Patient not taking: Reported on 12/8/2020) 30 tablet 0    omeprazole (PriLOSEC) 40 MG capsule Take 1 capsule (40 mg total) by mouth daily 60 capsule 0    polyethylene glycol (GLYCOLAX) 17 GM/SCOOP powder At 5pm take 5mgx2 dulcolax  At 6pm 32oz miralax in 64oz gatorade  Drink 8oz glass every 5 mins until 32oz finished  Drink remaining as rec  (Patient not taking: Reported on 3/16/2021) 238 g 0    TOVIAZ 8 MG TB24   0     No current facility-administered medications for this visit  No Known Allergies   Immunizations:     Immunization History   Administered Date(s) Administered    INFLUENZA 10/27/2006, 11/11/2008, 09/29/2015, 10/19/2018    Influenza Split High Dose Preservative Free IM 10/08/2016    Influenza, seasonal, injectable 09/30/2013, 08/18/2014, 09/22/2017    Pneumococcal Conjugate 13-Valent 05/23/2019    Pneumococcal Polysaccharide PPV23 10/27/2006    Tdap 05/23/2019    influenza, trivalent, adjuvanted 11/21/2019      Health Maintenance:         Topic Date Due    Colorectal Cancer Screening  Never done    Hepatitis C Screening  Completed         Topic Date Due    Pneumococcal Vaccine: 65+ Years (2 of 2 - PPSV23) 05/23/2020    COVID-19 Vaccine (2 - Moderna 2-dose series) 03/30/2021      Medicare Health Risk Assessment:     /80   Pulse 80   Temp 98 °F (36 7 °C) (Tympanic)   Resp 16   Ht 5' 1 4" (1 56 m)   Wt 62 5 kg (137 lb 12 8 oz)   BMI 25 70 kg/m²      Jose Brumfield is here for her Welcome to Medicare visit  Health Risk Assessment:   Patient rates overall health as fair  Patient feels that their physical health rating is slightly worse  Patient is very satisfied with their life  Eyesight was rated as slightly worse  Hearing was rated as same  Patient feels that their emotional and mental health rating is slightly worse  Patients states they are sometimes angry  Patient states they are sometimes unusually tired/fatigued  Pain experienced in the last 7 days has been none  Patient states that she has experienced weight loss or gain in last 6 months       Home Safety:  Patient has trouble with stairs inside or outside of their home  Patient has working smoke alarms and has working carbon monoxide detector  Home safety hazards include: none  Nutrition:   Current diet is Regular  Medications:   Patient is currently taking over-the-counter supplements  OTC medications include: VItamin D, Vitamin B12  Patient is able to manage medications  Activities of Daily Living (ADLs)/Instrumental Activities of Daily Living (IADLs):   Walk and transfer into and out of bed and chair?: Yes  Dress and groom yourself?: Yes    Bathe or shower yourself?: Yes    Feed yourself? Yes  Do your laundry/housekeeping?: Yes  Manage your money, pay your bills and track your expenses?: No  Make your own meals?: Yes    Do your own shopping?: No    ADL comments: Daughter helps with bills and grocery shopping     Previous Hospitalizations:   Any hospitalizations or ED visits within the last 12 months?: No      Advance Care Planning:   Living will: Yes    Durable POA for healthcare:  Yes    Advanced directive: Yes    Advanced directive counseling given: Yes    Five wishes given: Yes    Provider agrees with end of life decisions: Yes      Cognitive Screening:   Provider or family/friend/caregiver concerned regarding cognition?: No    PREVENTIVE SCREENINGS      Cardiovascular Screening:    General: Screening Not Indicated and History Lipid Disorder      Diabetes Screening:     General: Screening Current      Colorectal Cancer Screening:       Due for: Colonoscopy - High Risk      Cervical Cancer Screening:    General: Screening Not Indicated      Osteoporosis Screening:    General: Screening Current      Lung Cancer Screening:     General: Screening Not Indicated      Hepatitis C Screening:    General: Screening Current    Screening, Brief Intervention, and Referral to Treatment (SBIRT)    Screening  Typical number of drinks in a day: 0  Typical number of drinks in a week: 0  Interpretation: Low risk drinking behavior      Single Item Drug Screening:  How often have you used an illegal drug (including marijuana) or a prescription medication for non-medical reasons in the past year? never    Single Item Drug Screen Score: 0  Interpretation: Negative screen for possible drug use disorder      Robert Trejo MD

## 2021-06-15 ENCOUNTER — OFFICE VISIT (OUTPATIENT)
Dept: FAMILY MEDICINE CLINIC | Facility: CLINIC | Age: 76
End: 2021-06-15

## 2021-06-15 VITALS
RESPIRATION RATE: 14 BRPM | HEART RATE: 72 BPM | SYSTOLIC BLOOD PRESSURE: 130 MMHG | TEMPERATURE: 97.2 F | DIASTOLIC BLOOD PRESSURE: 68 MMHG | HEIGHT: 61 IN | BODY MASS INDEX: 26.21 KG/M2 | WEIGHT: 138.8 LBS

## 2021-06-15 DIAGNOSIS — K21.9 GASTROESOPHAGEAL REFLUX DISEASE, UNSPECIFIED WHETHER ESOPHAGITIS PRESENT: ICD-10-CM

## 2021-06-15 DIAGNOSIS — I10 ESSENTIAL HYPERTENSION: Primary | ICD-10-CM

## 2021-06-15 DIAGNOSIS — E87.6 HYPOKALEMIA: ICD-10-CM

## 2021-06-15 PROCEDURE — 1036F TOBACCO NON-USER: CPT | Performed by: FAMILY MEDICINE

## 2021-06-15 PROCEDURE — 1160F RVW MEDS BY RX/DR IN RCRD: CPT | Performed by: FAMILY MEDICINE

## 2021-06-15 PROCEDURE — 99213 OFFICE O/P EST LOW 20 MIN: CPT | Performed by: FAMILY MEDICINE

## 2021-06-15 PROCEDURE — 3075F SYST BP GE 130 - 139MM HG: CPT | Performed by: FAMILY MEDICINE

## 2021-06-15 PROCEDURE — 3078F DIAST BP <80 MM HG: CPT | Performed by: FAMILY MEDICINE

## 2021-06-15 RX ORDER — TROSPIUM CHLORIDE 20 MG/1
20 TABLET, FILM COATED ORAL 2 TIMES DAILY
COMMUNITY

## 2021-06-15 NOTE — ASSESSMENT & PLAN NOTE
Improving   -Continue Prilosec 40 mg daily     -Advised patient to call back GI office for follow up appointment

## 2021-06-15 NOTE — ASSESSMENT & PLAN NOTE
Lab Results   Component Value Date    K 3 2 (L) 03/23/2021   Patient was recommended to follow up BMP in April  She has not follow up   Advised to follow up blood work and will evaluate need to add K-Dur to regimen

## 2021-06-15 NOTE — PROGRESS NOTES
Assessment/Plan:    GERD (gastroesophageal reflux disease)  Improving   -Continue Prilosec 40 mg daily  -Advised patient to call back GI office for follow up appointment      Hypertension  Blood Pressure: 130/68   Well controlled  -Continue lisinopril-HCTZ 20-12 5 mg daily  Hypokalemia  Lab Results   Component Value Date    K 3 2 (L) 03/23/2021   Patient was recommended to follow up BMP in April  She has not follow up  Advised to follow up blood work and will evaluate need to add K-Dur to regimen           Problem List Items Addressed This Visit        Digestive    GERD (gastroesophageal reflux disease)     Improving   -Continue Prilosec 40 mg daily  -Advised patient to call back GI office for follow up appointment              Cardiovascular and Mediastinum    Hypertension - Primary     Blood Pressure: 130/68   Well controlled  -Continue lisinopril-HCTZ 20-12 5 mg daily  Other    Hypokalemia     Lab Results   Component Value Date    K 3 2 (L) 03/23/2021   Patient was recommended to follow up BMP in April  She has not follow up  Advised to follow up blood work and will evaluate need to add K-Dur to regimen                   Subjective:      Patient ID: Sayda Robles is a 76 y o  female  HPI     Patient here for follow up GERD, hypertension  Feeling overall well    The following portions of the patient's history were reviewed and updated as appropriate:   She  has a past medical history of Arthritis, Chest pain, Edema of lower extremity, Esophageal reflux, cardiac cath, and Hypertension  She  has a past surgical history that includes Bladder surgery  She  reports that she has never smoked  She has never used smokeless tobacco  She reports that she does not drink alcohol and does not use drugs    Current Outpatient Medications   Medication Sig Dispense Refill    ALPRAZolam (XANAX) 0 25 mg tablet Take 1 tablet by mouth 2 (two) times a day      atorvastatin (LIPITOR) 40 mg tablet take 1 tablet by mouth once daily 90 tablet 1    clobetasol (TEMOVATE) 0 05 % cream Apply 5 g (1 application total) topically 2 (two) times a day 30 g 0    diltiazem (Dilt-XR) 240 MG 24 hr capsule Take 1 capsule (240 mg total) by mouth daily 90 capsule 1    Docusate Sodium (COLACE PO) Take 200 mg by mouth      FLUAD 0 5 ML BERNABE inject 0 5 milliliter intramuscularly  0    FLUoxetine (PROzac) 20 mg capsule   0    hydrocortisone 2 5 % cream Apply 1 application topically 2 (two) times a day  0    lisinopril-hydrochlorothiazide (PRINZIDE,ZESTORETIC) 20-12 5 MG per tablet Take 1 tablet by mouth daily 90 tablet 1    omeprazole (PriLOSEC) 40 MG capsule Take 1 capsule (40 mg total) by mouth daily 60 capsule 0    trospium chloride (SANCTURA) 20 mg tablet Take 20 mg by mouth 2 (two) times a day       No current facility-administered medications for this visit  She has No Known Allergies       Review of Systems   Constitutional: Negative for fatigue and fever  HENT: Negative for congestion  Respiratory: Negative for cough  Cardiovascular: Negative for chest pain  Gastrointestinal: Negative for abdominal pain, diarrhea and nausea  Psychiatric/Behavioral: The patient is not nervous/anxious  Objective:      /68 (BP Location: Left arm, Patient Position: Sitting, Cuff Size: Large)   Pulse 72   Temp (!) 97 2 °F (36 2 °C) (Temporal)   Resp 14   Ht 5' 1 4" (1 56 m)   Wt 63 kg (138 lb 12 8 oz)   BMI 25 89 kg/m²          Physical Exam  Vitals reviewed  Constitutional:       General: She is not in acute distress  Appearance: She is well-developed  She is not diaphoretic  HENT:      Head: Normocephalic and atraumatic  Neck:      Thyroid: No thyromegaly  Cardiovascular:      Rate and Rhythm: Normal rate and regular rhythm  Heart sounds: Normal heart sounds  No murmur heard  No friction rub  No gallop  Pulmonary:      Effort: Pulmonary effort is normal  No respiratory distress  Breath sounds: Normal breath sounds  No wheezing or rales  Abdominal:      General: Bowel sounds are normal  There is no distension  Palpations: Abdomen is soft  Tenderness: There is no abdominal tenderness  There is no guarding  Musculoskeletal:         General: Normal range of motion  Cervical back: Normal range of motion and neck supple  Lymphadenopathy:      Cervical: No cervical adenopathy  Skin:     General: Skin is warm and dry  Capillary Refill: Capillary refill takes less than 2 seconds  Neurological:      Mental Status: She is alert and oriented to person, place, and time  Mental status is at baseline     Psychiatric:         Mood and Affect: Mood normal          Behavior: Behavior normal

## 2021-06-21 ENCOUNTER — VBI (OUTPATIENT)
Dept: ADMINISTRATIVE | Facility: OTHER | Age: 76
End: 2021-06-21

## 2021-07-06 ENCOUNTER — APPOINTMENT (OUTPATIENT)
Dept: LAB | Facility: HOSPITAL | Age: 76
End: 2021-07-06
Payer: COMMERCIAL

## 2021-07-06 DIAGNOSIS — I10 ESSENTIAL HYPERTENSION: ICD-10-CM

## 2021-07-06 DIAGNOSIS — E87.6 HYPOKALEMIA: ICD-10-CM

## 2021-07-06 LAB
ANION GAP SERPL CALCULATED.3IONS-SCNC: 6 MMOL/L (ref 4–13)
BUN SERPL-MCNC: 10 MG/DL (ref 5–25)
CALCIUM SERPL-MCNC: 9.2 MG/DL (ref 8.3–10.1)
CHLORIDE SERPL-SCNC: 104 MMOL/L (ref 100–108)
CO2 SERPL-SCNC: 26 MMOL/L (ref 21–32)
CREAT SERPL-MCNC: 0.79 MG/DL (ref 0.6–1.3)
GFR SERPL CREATININE-BSD FRML MDRD: 73 ML/MIN/1.73SQ M
GLUCOSE P FAST SERPL-MCNC: 89 MG/DL (ref 65–99)
POTASSIUM SERPL-SCNC: 3.4 MMOL/L (ref 3.5–5.3)
SODIUM SERPL-SCNC: 136 MMOL/L (ref 136–145)

## 2021-07-06 PROCEDURE — 80048 BASIC METABOLIC PNL TOTAL CA: CPT

## 2021-07-06 PROCEDURE — 36415 COLL VENOUS BLD VENIPUNCTURE: CPT

## 2021-08-16 DIAGNOSIS — E78.00 HYPERCHOLESTEROLEMIA: ICD-10-CM

## 2021-08-16 RX ORDER — ATORVASTATIN CALCIUM 40 MG/1
TABLET, FILM COATED ORAL
Qty: 90 TABLET | Refills: 1 | Status: SHIPPED | OUTPATIENT
Start: 2021-08-16 | End: 2022-02-10

## 2021-08-17 ENCOUNTER — TELEMEDICINE (OUTPATIENT)
Dept: FAMILY MEDICINE CLINIC | Facility: CLINIC | Age: 76
End: 2021-08-17

## 2021-08-17 DIAGNOSIS — Z20.822 EXPOSURE TO COVID-19 VIRUS: Primary | ICD-10-CM

## 2021-08-17 PROCEDURE — U0005 INFEC AGEN DETEC AMPLI PROBE: HCPCS | Performed by: FAMILY MEDICINE

## 2021-08-17 PROCEDURE — 99213 OFFICE O/P EST LOW 20 MIN: CPT | Performed by: FAMILY MEDICINE

## 2021-08-17 PROCEDURE — U0003 INFECTIOUS AGENT DETECTION BY NUCLEIC ACID (DNA OR RNA); SEVERE ACUTE RESPIRATORY SYNDROME CORONAVIRUS 2 (SARS-COV-2) (CORONAVIRUS DISEASE [COVID-19]), AMPLIFIED PROBE TECHNIQUE, MAKING USE OF HIGH THROUGHPUT TECHNOLOGIES AS DESCRIBED BY CMS-2020-01-R: HCPCS | Performed by: FAMILY MEDICINE

## 2021-08-17 NOTE — PROGRESS NOTES
COVID-19 Outpatient Progress Note    Assessment/Plan:    Problem List Items Addressed This Visit        Other    Exposure to COVID-19 virus - Primary     Symptom onset: 3 days ago  Fully vaccinated   Patient's family who recently visited the home just tested positive for COVID  Patient requesting COVID testing to rule out infection despite being vaccinated  Complaining of headache but no cough, fever, or shortness of breath  Will send for COVID testing         Relevant Orders    Novel Coronavirus (Covid-19),PCR [facility]         Disposition:     I referred patient to one of our centralized sites for a COVID-19 swab  I have spent 15 minutes directly with the patient  Verification of patient location:    Patient is located in the following state in which I hold an active license [place]    Encounter provider Genet Frazier DO    Provider located at [address] [place] [address]   [phone]    Recent Visits  No visits were found meeting these conditions  Showing recent visits within past 7 days and meeting all other requirements  Today's Visits  Date Type Provider Dept   08/17/21 Telemedicine Genet Frazier, [address] [place] [address] today's visits and meeting all other requirements  Future Appointments  No visits were found meeting these conditions  Showing future appointments within next 150 days and meeting all other requirements     This virtual check-in was done via [employer] and patient was informed that this is a secure, HIPAA-compliant platform  She agrees to proceed  Patient agrees to participate in a virtual check in via telephone or video visit instead of presenting to the office to address urgent/immediate medical needs  Patient is aware this is a billable service  After connecting through [facility], the patient was identified by name and date of birth   Abbyavis Henry was informed that this was a telemedicine visit and that the exam was being conducted confidentially over secure lines  My office door was closed  No one else was in the room  Tom Smart acknowledged consent and understanding of privacy and security of the telemedicine visit  I informed the patient that I have reviewed her record in Epic and presented the opportunity for her to ask any questions regarding the visit today  The patient agreed to participate  Subjective:   Tom Smart is a 76 y o  female who is concerned about COVID-19  Patient's symptoms include chest tightness and headache  Patient denies fever, chills, fatigue, malaise, congestion, rhinorrhea, sore throat, anosmia, loss of taste, cough, shortness of breath, abdominal pain, nausea, vomiting, diarrhea and myalgias  Date of symptom onset: 8/15/2021  Date of exposure: 8/15/2021  COVID-19 vaccination status: Fully vaccinated    Exposure:   Contact with a person who is under investigation (PUI) for or who is positive for COVID-19 within the last 14 days?: Yes    Hospitalized recently for fever and/or lower respiratory symptoms?: No      Currently a healthcare worker that is involved in direct patient care?: No      Works in a special setting where the risk of COVID-19 transmission may be high? (this may include long-term care, correctional and [facility] facilities; homeless shelters; assisted-living facilities and group homes ): No      Resident in a special setting where the risk of COVID-19 transmission may be high? (this may include long-term care, correctional and [facility] facilities; homeless shelters; assisted-living facilities and group homes ): No      Patient is Iranian speaking only, her daughter Leon He was present during the call to help with translation  Patient has been having a headache since the weekend  Her son in law was just visiting this last weekend and he tested positive for COVID recently   She also had some chest discomfort but no longer does  Due to the patient's son in law and daughter recently testing positive and was just in their home, she is concerned about COVID  Requesting testing  No results found for: Margarito Bhatti, AKASH, [address]  Past Medical History:   Diagnosis Date    Arthritis     Chest pain     Edema of lower extremity     Esophageal reflux     Hx of cardiac cath     Hypertension      Past Surgical History:   Procedure Laterality Date    BLADDER SURGERY       Current Outpatient Medications   Medication Sig Dispense Refill    ALPRAZolam (XANAX) 0 25 mg tablet Take 1 tablet by mouth 2 (two) times a day      atorvastatin (LIPITOR) 40 mg tablet take 1 tablet by mouth once daily 90 tablet 1    clobetasol (TEMOVATE) 0 05 % cream Apply 5 g (1 application total) topically 2 (two) times a day 30 g 0    diltiazem (Dilt-XR) 240 MG 24 hr capsule Take 1 capsule (240 mg total) by mouth daily 90 capsule 1    Docusate Sodium (COLACE PO) Take 200 mg by mouth      FLUAD 0 5 ML BERNABE inject 0 5 milliliter intramuscularly  0    FLUoxetine (PROzac) 20 mg capsule   0    hydrocortisone 2 5 % cream Apply 1 application topically 2 (two) times a day  0    lisinopril-hydrochlorothiazide (PRINZIDE,ZESTORETIC) 20-12 5 MG per tablet Take 1 tablet by mouth daily 90 tablet 1    omeprazole (PriLOSEC) 40 MG capsule Take 1 capsule (40 mg total) by mouth daily 60 capsule 0    trospium chloride (SANCTURA) 20 mg tablet Take 20 mg by mouth 2 (two) times a day       No current facility-administered medications for this visit  No Known Allergies    Review of Systems   Constitutional: Negative for chills, fatigue and fever  HENT: Negative for congestion, rhinorrhea and sore throat  Respiratory: Positive for chest tightness  Negative for cough and shortness of breath  Gastrointestinal: Negative for abdominal pain, diarrhea, nausea and vomiting  Musculoskeletal: Negative for myalgias  Neurological: Positive for headaches  Objective: There were no vitals filed for this visit  Physical Exam    VIRTUAL VISIT DISCLAIMER    Sussy York verbally agrees to participate in [employer]  Pt is aware that [employer] could be limited without vital signs or the ability to perform a full hands-on physical Arely Hurst understands she or the provider may request at any time to terminate the video visit and request the patient to seek care or treatment in person

## 2021-08-17 NOTE — ASSESSMENT & PLAN NOTE
Symptom onset: 3 days ago  Fully vaccinated   Patient's family who recently visited the home just tested positive for COVID  Patient requesting COVID testing to rule out infection despite being vaccinated  Complaining of headache but no cough, fever, or shortness of breath  Will send for COVID testing

## 2021-09-08 DIAGNOSIS — K21.9 GASTROESOPHAGEAL REFLUX DISEASE: ICD-10-CM

## 2021-09-08 RX ORDER — OMEPRAZOLE 40 MG/1
CAPSULE, DELAYED RELEASE ORAL
Qty: 60 CAPSULE | Refills: 0 | Status: SHIPPED | OUTPATIENT
Start: 2021-09-08 | End: 2021-11-09

## 2021-10-12 ENCOUNTER — OFFICE VISIT (OUTPATIENT)
Dept: FAMILY MEDICINE CLINIC | Facility: CLINIC | Age: 76
End: 2021-10-12

## 2021-10-12 ENCOUNTER — TELEPHONE (OUTPATIENT)
Dept: FAMILY MEDICINE CLINIC | Facility: CLINIC | Age: 76
End: 2021-10-12

## 2021-10-12 VITALS
HEIGHT: 64 IN | HEART RATE: 94 BPM | OXYGEN SATURATION: 99 % | RESPIRATION RATE: 18 BRPM | WEIGHT: 141.6 LBS | DIASTOLIC BLOOD PRESSURE: 72 MMHG | SYSTOLIC BLOOD PRESSURE: 128 MMHG | BODY MASS INDEX: 24.17 KG/M2 | TEMPERATURE: 97.6 F

## 2021-10-12 DIAGNOSIS — H25.9 AGE-RELATED CATARACT OF BOTH EYES, UNSPECIFIED AGE-RELATED CATARACT TYPE: ICD-10-CM

## 2021-10-12 DIAGNOSIS — Z01.818 PREOPERATIVE CLEARANCE: Primary | ICD-10-CM

## 2021-10-12 PROCEDURE — 99213 OFFICE O/P EST LOW 20 MIN: CPT | Performed by: FAMILY MEDICINE

## 2021-10-12 PROCEDURE — 3074F SYST BP LT 130 MM HG: CPT | Performed by: FAMILY MEDICINE

## 2021-10-12 PROCEDURE — 1036F TOBACCO NON-USER: CPT | Performed by: FAMILY MEDICINE

## 2021-10-12 PROCEDURE — 3078F DIAST BP <80 MM HG: CPT | Performed by: FAMILY MEDICINE

## 2021-10-12 PROCEDURE — 1160F RVW MEDS BY RX/DR IN RCRD: CPT | Performed by: FAMILY MEDICINE

## 2021-10-26 ENCOUNTER — OFFICE VISIT (OUTPATIENT)
Dept: PODIATRY | Facility: CLINIC | Age: 76
End: 2021-10-26
Payer: COMMERCIAL

## 2021-10-26 VITALS
DIASTOLIC BLOOD PRESSURE: 74 MMHG | SYSTOLIC BLOOD PRESSURE: 132 MMHG | HEART RATE: 61 BPM | WEIGHT: 144.2 LBS | BODY MASS INDEX: 24.62 KG/M2 | HEIGHT: 64 IN

## 2021-10-26 DIAGNOSIS — R61 HYPERHIDROSIS: Primary | ICD-10-CM

## 2021-10-26 PROCEDURE — 99202 OFFICE O/P NEW SF 15 MIN: CPT | Performed by: PODIATRIST

## 2021-10-26 PROCEDURE — 1160F RVW MEDS BY RX/DR IN RCRD: CPT | Performed by: PODIATRIST

## 2021-10-26 PROCEDURE — 1036F TOBACCO NON-USER: CPT | Performed by: PODIATRIST

## 2021-10-26 RX ORDER — CYCLOPENTOLATE HYDROCHLORIDE 10 MG/ML
SOLUTION/ DROPS OPHTHALMIC
COMMUNITY
Start: 2021-10-20

## 2021-10-26 RX ORDER — PERPHENAZINE 2 MG/1
TABLET, FILM COATED ORAL
COMMUNITY
Start: 2021-10-26

## 2021-11-09 DIAGNOSIS — I10 HYPERTENSION, UNSPECIFIED TYPE: ICD-10-CM

## 2021-11-09 DIAGNOSIS — K21.9 GASTROESOPHAGEAL REFLUX DISEASE: ICD-10-CM

## 2021-11-09 RX ORDER — OMEPRAZOLE 40 MG/1
CAPSULE, DELAYED RELEASE ORAL
Qty: 60 CAPSULE | Refills: 0 | Status: SHIPPED | OUTPATIENT
Start: 2021-11-09 | End: 2022-01-10

## 2021-11-10 RX ORDER — LISINOPRIL AND HYDROCHLOROTHIAZIDE 20; 12.5 MG/1; MG/1
1 TABLET ORAL DAILY
Qty: 90 TABLET | Refills: 1 | Status: SHIPPED | OUTPATIENT
Start: 2021-11-10 | End: 2022-03-24 | Stop reason: SDUPTHER

## 2021-12-07 ENCOUNTER — OFFICE VISIT (OUTPATIENT)
Dept: PODIATRY | Facility: CLINIC | Age: 76
End: 2021-12-07
Payer: COMMERCIAL

## 2021-12-07 VITALS
HEART RATE: 63 BPM | SYSTOLIC BLOOD PRESSURE: 144 MMHG | WEIGHT: 143 LBS | HEIGHT: 64 IN | DIASTOLIC BLOOD PRESSURE: 78 MMHG | BODY MASS INDEX: 24.41 KG/M2

## 2021-12-07 DIAGNOSIS — R61 HYPERHIDROSIS: Primary | ICD-10-CM

## 2021-12-07 PROCEDURE — 1036F TOBACCO NON-USER: CPT | Performed by: PODIATRIST

## 2021-12-07 PROCEDURE — 3077F SYST BP >= 140 MM HG: CPT | Performed by: PODIATRIST

## 2021-12-07 PROCEDURE — 3078F DIAST BP <80 MM HG: CPT | Performed by: PODIATRIST

## 2021-12-07 PROCEDURE — 1160F RVW MEDS BY RX/DR IN RCRD: CPT | Performed by: PODIATRIST

## 2021-12-07 PROCEDURE — 99212 OFFICE O/P EST SF 10 MIN: CPT | Performed by: PODIATRIST

## 2021-12-28 ENCOUNTER — APPOINTMENT (OUTPATIENT)
Dept: LAB | Facility: HOSPITAL | Age: 76
End: 2021-12-28
Payer: COMMERCIAL

## 2021-12-28 DIAGNOSIS — Z79.899 ENCOUNTER FOR LONG-TERM (CURRENT) USE OF OTHER MEDICATIONS: ICD-10-CM

## 2021-12-28 DIAGNOSIS — Z13.9 SCREENING FOR UNSPECIFIED CONDITION: ICD-10-CM

## 2021-12-28 DIAGNOSIS — F11.20 OPIOID TYPE DEPENDENCE, CONTINUOUS (HCC): ICD-10-CM

## 2021-12-28 LAB
25(OH)D3 SERPL-MCNC: 34.2 NG/ML (ref 30–100)
ALBUMIN SERPL BCP-MCNC: 3.9 G/DL (ref 3.5–5)
ALP SERPL-CCNC: 103 U/L (ref 46–116)
ALT SERPL W P-5'-P-CCNC: 21 U/L (ref 12–78)
ANION GAP SERPL CALCULATED.3IONS-SCNC: 5 MMOL/L (ref 4–13)
AST SERPL W P-5'-P-CCNC: 22 U/L (ref 5–45)
BASOPHILS # BLD AUTO: 0.05 THOUSANDS/ΜL (ref 0–0.1)
BASOPHILS NFR BLD AUTO: 1 % (ref 0–1)
BILIRUB SERPL-MCNC: 0.4 MG/DL (ref 0.2–1)
BUN SERPL-MCNC: 17 MG/DL (ref 5–25)
CALCIUM SERPL-MCNC: 9.2 MG/DL (ref 8.3–10.1)
CHLORIDE SERPL-SCNC: 104 MMOL/L (ref 100–108)
CHOLEST SERPL-MCNC: 153 MG/DL
CO2 SERPL-SCNC: 28 MMOL/L (ref 21–32)
CREAT SERPL-MCNC: 1.06 MG/DL (ref 0.6–1.3)
EOSINOPHIL # BLD AUTO: 0.33 THOUSAND/ΜL (ref 0–0.61)
EOSINOPHIL NFR BLD AUTO: 4 % (ref 0–6)
ERYTHROCYTE [DISTWIDTH] IN BLOOD BY AUTOMATED COUNT: 13.4 % (ref 11.6–15.1)
EST. AVERAGE GLUCOSE BLD GHB EST-MCNC: 108 MG/DL
FOLATE SERPL-MCNC: >20 NG/ML (ref 3.1–17.5)
GFR SERPL CREATININE-BSD FRML MDRD: 51 ML/MIN/1.73SQ M
GLUCOSE P FAST SERPL-MCNC: 93 MG/DL (ref 65–99)
HBA1C MFR BLD: 5.4 %
HCT VFR BLD AUTO: 35.2 % (ref 34.8–46.1)
HDLC SERPL-MCNC: 66 MG/DL
HGB BLD-MCNC: 11.4 G/DL (ref 11.5–15.4)
IMM GRANULOCYTES # BLD AUTO: 0.04 THOUSAND/UL (ref 0–0.2)
IMM GRANULOCYTES NFR BLD AUTO: 0 % (ref 0–2)
LDLC SERPL CALC-MCNC: 71 MG/DL (ref 0–100)
LYMPHOCYTES # BLD AUTO: 3.37 THOUSANDS/ΜL (ref 0.6–4.47)
LYMPHOCYTES NFR BLD AUTO: 36 % (ref 14–44)
MCH RBC QN AUTO: 28.4 PG (ref 26.8–34.3)
MCHC RBC AUTO-ENTMCNC: 32.4 G/DL (ref 31.4–37.4)
MCV RBC AUTO: 88 FL (ref 82–98)
MONOCYTES # BLD AUTO: 0.69 THOUSAND/ΜL (ref 0.17–1.22)
MONOCYTES NFR BLD AUTO: 8 % (ref 4–12)
NEUTROPHILS # BLD AUTO: 4.77 THOUSANDS/ΜL (ref 1.85–7.62)
NEUTS SEG NFR BLD AUTO: 51 % (ref 43–75)
NONHDLC SERPL-MCNC: 87 MG/DL
NRBC BLD AUTO-RTO: 0 /100 WBCS
PLATELET # BLD AUTO: 307 THOUSANDS/UL (ref 149–390)
PMV BLD AUTO: 9.6 FL (ref 8.9–12.7)
POTASSIUM SERPL-SCNC: 3.6 MMOL/L (ref 3.5–5.3)
PROT SERPL-MCNC: 7.4 G/DL (ref 6.4–8.2)
RBC # BLD AUTO: 4.01 MILLION/UL (ref 3.81–5.12)
SODIUM SERPL-SCNC: 137 MMOL/L (ref 136–145)
T3 SERPL-MCNC: 1.3 NG/ML (ref 0.6–1.8)
T4 FREE SERPL-MCNC: 1.06 NG/DL (ref 0.76–1.46)
TRIGL SERPL-MCNC: 81 MG/DL
TSH SERPL DL<=0.05 MIU/L-ACNC: 5.12 UIU/ML (ref 0.36–3.74)
VIT B12 SERPL-MCNC: 662 PG/ML (ref 100–900)
WBC # BLD AUTO: 9.25 THOUSAND/UL (ref 4.31–10.16)

## 2021-12-28 PROCEDURE — 84480 ASSAY TRIIODOTHYRONINE (T3): CPT

## 2021-12-28 PROCEDURE — 80053 COMPREHEN METABOLIC PANEL: CPT

## 2021-12-28 PROCEDURE — 36415 COLL VENOUS BLD VENIPUNCTURE: CPT

## 2021-12-28 PROCEDURE — 84443 ASSAY THYROID STIM HORMONE: CPT

## 2021-12-28 PROCEDURE — 85025 COMPLETE CBC W/AUTO DIFF WBC: CPT

## 2021-12-28 PROCEDURE — 82306 VITAMIN D 25 HYDROXY: CPT

## 2021-12-28 PROCEDURE — 83036 HEMOGLOBIN GLYCOSYLATED A1C: CPT

## 2021-12-28 PROCEDURE — 82746 ASSAY OF FOLIC ACID SERUM: CPT

## 2021-12-28 PROCEDURE — 84439 ASSAY OF FREE THYROXINE: CPT

## 2021-12-28 PROCEDURE — 80061 LIPID PANEL: CPT

## 2021-12-28 PROCEDURE — 82607 VITAMIN B-12: CPT

## 2022-01-08 ENCOUNTER — TELEPHONE (OUTPATIENT)
Dept: FAMILY MEDICINE CLINIC | Facility: CLINIC | Age: 77
End: 2022-01-08

## 2022-01-08 DIAGNOSIS — K21.9 GASTROESOPHAGEAL REFLUX DISEASE: ICD-10-CM

## 2022-01-10 RX ORDER — OMEPRAZOLE 40 MG/1
CAPSULE, DELAYED RELEASE ORAL
Qty: 60 CAPSULE | Refills: 0 | Status: SHIPPED | OUTPATIENT
Start: 2022-01-10 | End: 2022-02-24 | Stop reason: SDUPTHER

## 2022-02-10 DIAGNOSIS — E78.00 HYPERCHOLESTEROLEMIA: ICD-10-CM

## 2022-02-10 RX ORDER — ATORVASTATIN CALCIUM 40 MG/1
TABLET, FILM COATED ORAL
Qty: 90 TABLET | Refills: 1 | Status: SHIPPED | OUTPATIENT
Start: 2022-02-10 | End: 2022-03-24 | Stop reason: SDUPTHER

## 2022-02-24 DIAGNOSIS — K21.9 GASTROESOPHAGEAL REFLUX DISEASE: ICD-10-CM

## 2022-02-24 RX ORDER — OMEPRAZOLE 40 MG/1
40 CAPSULE, DELAYED RELEASE ORAL DAILY
Qty: 60 CAPSULE | Refills: 0 | Status: CANCELLED | OUTPATIENT
Start: 2022-02-24

## 2022-02-24 RX ORDER — OMEPRAZOLE 40 MG/1
40 CAPSULE, DELAYED RELEASE ORAL DAILY
Qty: 10 CAPSULE | Refills: 0 | Status: SHIPPED | OUTPATIENT
Start: 2022-02-24 | End: 2022-03-14

## 2022-02-24 NOTE — TELEPHONE ENCOUNTER
Patient calling asking for medication refill up until her appt  She is out of medication      She is scheduled with Dr Lainey Haddad next Thursday March 3 at 530pm

## 2022-03-14 DIAGNOSIS — K21.9 GASTROESOPHAGEAL REFLUX DISEASE: ICD-10-CM

## 2022-03-14 RX ORDER — OMEPRAZOLE 40 MG/1
CAPSULE, DELAYED RELEASE ORAL
Qty: 10 CAPSULE | Refills: 0 | Status: SHIPPED | OUTPATIENT
Start: 2022-03-14 | End: 2022-03-24 | Stop reason: SDUPTHER

## 2022-03-24 ENCOUNTER — OFFICE VISIT (OUTPATIENT)
Dept: FAMILY MEDICINE CLINIC | Facility: CLINIC | Age: 77
End: 2022-03-24

## 2022-03-24 VITALS
SYSTOLIC BLOOD PRESSURE: 160 MMHG | BODY MASS INDEX: 25.23 KG/M2 | RESPIRATION RATE: 18 BRPM | WEIGHT: 147.8 LBS | HEART RATE: 86 BPM | TEMPERATURE: 97.8 F | OXYGEN SATURATION: 98 % | DIASTOLIC BLOOD PRESSURE: 84 MMHG | HEIGHT: 64 IN

## 2022-03-24 DIAGNOSIS — M54.41 ACUTE RIGHT-SIDED LOW BACK PAIN WITH RIGHT-SIDED SCIATICA: Primary | ICD-10-CM

## 2022-03-24 DIAGNOSIS — K21.9 GASTROESOPHAGEAL REFLUX DISEASE: ICD-10-CM

## 2022-03-24 DIAGNOSIS — E78.00 HYPERCHOLESTEROLEMIA: ICD-10-CM

## 2022-03-24 DIAGNOSIS — I10 HYPERTENSION, UNSPECIFIED TYPE: ICD-10-CM

## 2022-03-24 DIAGNOSIS — K59.09 OTHER CONSTIPATION: ICD-10-CM

## 2022-03-24 PROCEDURE — 99213 OFFICE O/P EST LOW 20 MIN: CPT | Performed by: FAMILY MEDICINE

## 2022-03-24 RX ORDER — OMEPRAZOLE 40 MG/1
40 CAPSULE, DELAYED RELEASE ORAL DAILY
Qty: 90 CAPSULE | Refills: 5 | Status: SHIPPED | OUTPATIENT
Start: 2022-03-24 | End: 2022-06-28

## 2022-03-24 RX ORDER — DILTIAZEM HYDROCHLORIDE 240 MG/1
240 CAPSULE, EXTENDED RELEASE ORAL DAILY
Qty: 90 CAPSULE | Refills: 5 | Status: SHIPPED | OUTPATIENT
Start: 2022-03-24

## 2022-03-24 RX ORDER — DOCUSATE SODIUM 100 MG/1
200 CAPSULE, LIQUID FILLED ORAL 2 TIMES DAILY
Qty: 120 CAPSULE | Refills: 5 | Status: SHIPPED | OUTPATIENT
Start: 2022-03-24 | End: 2022-06-28

## 2022-03-24 RX ORDER — LISINOPRIL AND HYDROCHLOROTHIAZIDE 20; 12.5 MG/1; MG/1
1 TABLET ORAL DAILY
Qty: 90 TABLET | Refills: 5 | Status: SHIPPED | OUTPATIENT
Start: 2022-03-24 | End: 2022-06-28

## 2022-03-24 RX ORDER — ATORVASTATIN CALCIUM 40 MG/1
40 TABLET, FILM COATED ORAL DAILY
Qty: 90 TABLET | Refills: 5 | Status: SHIPPED | OUTPATIENT
Start: 2022-03-24 | End: 2022-06-28

## 2022-03-24 NOTE — PROGRESS NOTES
[address] [place] [address] Road 68 y o  female MRN: [id number]  Encounter: [id number]  3/24/2022      Assessment/Plan:       Problem List Items Addressed This Visit        Cardiovascular and Mediastinum    Hypertension     Elevated 212/100, repeat 160/80s, asymptomatic  Previously controlled on diltiazem 250 mg daily, lisinopril-HCTZ 20-12 5 mg daily  Consistent with meds, took pills this morning  Likely secondary to pain  Plan  · Discussed reasons to return, and parameters to be evaluated in the ED  · Refills in place  · Return in 2 weeks for monitoring and possible need for increasing dosage  Relevant Medications    diltiazem (Dilt-XR) 240 MG 24 hr capsule    lisinopril-hydrochlorothiazide (PRINZIDE,ZESTORETIC) 20-12 5 MG per tablet       Other    Hypercholesterolemia    Relevant Medications    atorvastatin (LIPITOR) 40 mg tablet    Low back pain - Primary     Uncontrolled, new onset of right lower back pain started 2 weeks ago, tried tylenol 200 about 2-3x/day without improvement  Recently twisted back when getting out of bed  Plan  · Scheduled tylenol, max 3000mg per day  · PRN alternate Lidocaine patch, Voltaren gel   · Schedule OMT  · Return if no improvement in 2 weeks          Relevant Medications    Diclofenac Sodium (VOLTAREN) 1 %      Other Visit Diagnoses     Gastroesophageal reflux disease        Relevant Medications    omeprazole (PriLOSEC) 40 MG capsule    Other constipation        Relevant Medications    docusate sodium (Colace) 100 mg capsule              Follow up appointment: 2 weeks for lower back pain and BP monitoring   ________________________________________________________________________  Subjective:       HPI:  Jennifer Davies is a 68 y o  female presents to the office for lower back pain for the past 2 weeks and has been taking 200 mg 2-3 times a day  The pain sometimes radiates to the right leg   She nearly fell and twisted her back while getting out of bed 2 weeks ago  Denies complete fall, hitting anything  Moving makes it worse, resting makes it better  Denies n/v, urinary symptoms, fevers, chills  Would like to also have medications refilled  Patient nervous about her bp being elevated and recognizes that it could be due to pain  Reports taking meds this morning and being consistent with it  She is able to name her medications and recognizes what each medication is for  Denies having uncontrolled bp in the recent times, but has had a history of requiring hospitalization for hypertension emergency  Denies dizziness, headaches, presyncope, change in vision, n/v, weakness  Review of Systems   Constitutional: Negative for chills, fatigue and fever  HENT: Negative for sore throat and trouble swallowing  Eyes: Negative for visual disturbance  Respiratory: Negative for chest tightness and shortness of breath  Cardiovascular: Negative for chest pain  Gastrointestinal: Negative for abdominal pain, nausea and vomiting  Musculoskeletal: Positive for back pain (right lower back)  Skin: Negative for color change  Neurological: Negative for dizziness, syncope, weakness, light-headedness and headaches  Psychiatric/Behavioral: Negative for confusion  The patient is not nervous/anxious            Historical Information   Past Medical History:   Diagnosis Date    Arthritis     Chest pain     Edema of lower extremity     Esophageal reflux     Hx of cardiac cath     Hypertension      Past Surgical History:   Procedure Laterality Date    BLADDER SURGERY       Social History   Social History     Substance and Sexual Activity   Alcohol Use No     Social History     Substance and Sexual Activity   Drug Use Never     Social History     Tobacco Use   Smoking Status Never Smoker   Smokeless Tobacco Never Used     Family History   Problem Relation Age of Onset    COPD Mother     Emphysema Mother          Current Outpatient Medications:     ALPRAZolam (XANAX) 0 25 mg tablet, Take 1 tablet by mouth 2 (two) times a day, Disp: , Rfl:     aluminum chloride (DRYSOL) 20 % external solution, Apply topically daily at bedtime, Disp: 35 mL, Rfl: 3    aluminum chloride (DRYSOL) 20 % external solution, Apply topically daily at bedtime, Disp: 35 mL, Rfl: 10    atorvastatin (LIPITOR) 40 mg tablet, Take 1 tablet (40 mg total) by mouth daily, Disp: 90 tablet, Rfl: 5    ciclopirox (LOPROX) 0 77 % cream, APPLY TO AFFECTED AREA UNDER THE BREAST AS NEEDED FOR FLARES , Disp: , Rfl:     clobetasol (TEMOVATE) 0 05 % cream, Apply 5 g (1 application total) topically 2 (two) times a day, Disp: 30 g, Rfl: 0    cyclopentolate (CYCLOGYL) 1 % ophthalmic solution, , Disp: , Rfl:     diltiazem (Dilt-XR) 240 MG 24 hr capsule, Take 1 capsule (240 mg total) by mouth daily, Disp: 90 capsule, Rfl: 5    FLUAD 0 5 ML BERNABE, inject 0 5 milliliter intramuscularly, Disp: , Rfl: 0    FLUoxetine (PROzac) 20 mg capsule, , Disp: , Rfl: 0    hydrocortisone 2 5 % cream, Apply 1 application topically 2 (two) times a day, Disp: , Rfl: 0    lisinopril-hydrochlorothiazide (PRINZIDE,ZESTORETIC) 20-12 5 MG per tablet, Take 1 tablet by mouth daily, Disp: 90 tablet, Rfl: 5    omeprazole (PriLOSEC) 40 MG capsule, Take 1 capsule (40 mg total) by mouth daily, Disp: 90 capsule, Rfl: 5    perphenazine 2 mg tablet, , Disp: , Rfl:     trospium chloride (SANCTURA) 20 mg tablet, Take 20 mg by mouth 2 (two) times a day, Disp: , Rfl:     Diclofenac Sodium (VOLTAREN) 1 %, Apply 2 g topically 4 (four) times a day, Disp: 240 g, Rfl: 0    docusate sodium (Colace) 100 mg capsule, Take 2 capsules (200 mg total) by mouth 2 (two) times a day, Disp: 120 capsule, Rfl: 5       Meds/Allergies   (Not in a hospital admission)    No current facility-administered medications for this visit  No Known Allergies      Objective:     Blood pressure 160/84, pulse 86, temperature 97 8 °F (36 6 °C), temperature source Temporal, resp   rate 18, height 5' 4" (1 626 m), weight 67 kg (147 lb 12 8 oz), SpO2 98 %  Body mass index is 25 37 kg/m²  Physical Exam  Vitals reviewed  Constitutional:       General: She is not in acute distress  Appearance: Normal appearance  She is not ill-appearing, toxic-appearing or diaphoretic  HENT:      Head: Normocephalic and atraumatic  Right Ear: External ear normal       Left Ear: External ear normal       Nose: Nose normal  No congestion or rhinorrhea  Mouth/Throat:      Mouth: Mucous membranes are moist    Eyes:      General: No scleral icterus  Right eye: No discharge  Left eye: No discharge  Extraocular Movements: Extraocular movements intact  Conjunctiva/sclera: Conjunctivae normal       Pupils: Pupils are equal, round, and reactive to light  Cardiovascular:      Rate and Rhythm: Normal rate and regular rhythm  Pulses: Normal pulses  Heart sounds: Normal heart sounds  No murmur heard  Pulmonary:      Effort: Pulmonary effort is normal  No respiratory distress  Breath sounds: Normal breath sounds  No wheezing  Abdominal:      General: Bowel sounds are normal       Palpations: Abdomen is soft  Tenderness: There is no abdominal tenderness  There is no right CVA tenderness, left CVA tenderness or guarding  Musculoskeletal:         General: Tenderness (lower right lumbar region) present  No swelling or deformity  Normal range of motion  Cervical back: Normal range of motion and neck supple  No rigidity  No muscular tenderness  Right lower leg: No edema  Left lower leg: No edema  Skin:     General: Skin is warm and dry  Capillary Refill: Capillary refill takes less than 2 seconds  Coloration: Skin is not pale  Neurological:      General: No focal deficit present  Mental Status: She is alert and oriented to person, place, and time  Cranial Nerves: No cranial nerve deficit  Motor: No weakness     Psychiatric: Mood and Affect: Mood normal          Behavior: Behavior normal          Thought Content: Thought content normal          Judgment: Judgment normal            LAB RESULTS:     No visits with results within 1 Day(s) from this visit     Latest known visit with results is:   Appointment on 12/28/2021   Component Date Value    WBC 12/28/2021 9 25     RBC 12/28/2021 4 01     Hemoglobin 12/28/2021 11 4*    Hematocrit 12/28/2021 35 2     MCV 12/28/2021 88     MCH 12/28/2021 28 4     MCHC 12/28/2021 32 4     RDW 12/28/2021 13 4     MPV 12/28/2021 9 6     Platelets 05/91/4625 307     nRBC 12/28/2021 0     Neutrophils Relative 12/28/2021 51     Immat GRANS % 12/28/2021 0     Lymphocytes Relative 12/28/2021 36     Monocytes Relative 12/28/2021 8     Eosinophils Relative 12/28/2021 4     Basophils Relative 12/28/2021 1     Neutrophils Absolute 12/28/2021 4 77     Immature Grans Absolute 12/28/2021 0 04     Lymphocytes Absolute 12/28/2021 3 37     Monocytes Absolute 12/28/2021 0 69     Eosinophils Absolute 12/28/2021 0 33     Basophils Absolute 12/28/2021 0 05     Sodium 12/28/2021 137     Potassium 12/28/2021 3 6     Chloride 12/28/2021 104     CO2 12/28/2021 28     ANION GAP 12/28/2021 5     BUN 12/28/2021 17     Creatinine 12/28/2021 1 06     Glucose, Fasting 12/28/2021 93     Calcium 12/28/2021 9 2     AST 12/28/2021 22     ALT 12/28/2021 21     Alkaline Phosphatase 12/28/2021 103     Total Protein 12/28/2021 7 4     Albumin 12/28/2021 3 9     Total Bilirubin 12/28/2021 0 40     eGFR 12/28/2021 51     Cholesterol 12/28/2021 153     Triglycerides 12/28/2021 81     HDL, Direct 12/28/2021 66     LDL Calculated 12/28/2021 71     Non-HDL-Chol (CHOL-HDL) 12/28/2021 87     TSH 3RD GENERATON 12/28/2021 5 120*    T3, Total 12/28/2021 1 30     Free T4 12/28/2021 1 06     Hemoglobin A1C 12/28/2021 5 4     EAG 12/28/2021 108     Vit D, 25-Hydroxy 12/28/2021 34 2     Vitamin B-12 12/28/2021 662     Folate 12/28/2021 >20 0*       RADIOLOGY RESULTS: I have personally reviewed pertinent imaging studies            Jennifer Simeon MD  Family Medicine, PGY-2  2:52 PM 3/24/2022

## 2022-03-24 NOTE — ASSESSMENT & PLAN NOTE
Elevated 212/100, repeat 160/80s, asymptomatic  Previously controlled on diltiazem 250 mg daily, lisinopril-HCTZ 20-12 5 mg daily  Consistent with meds, took pills this morning  Likely secondary to pain  Plan  · Discussed reasons to return, and parameters to be evaluated in the ED  · Refills in place  · Return in 2 weeks for monitoring and possible need for increasing dosage

## 2022-03-24 NOTE — ASSESSMENT & PLAN NOTE
Uncontrolled, new onset of right lower back pain started 2 weeks ago, tried tylenol 200 about 2-3x/day without improvement  Recently twisted back when getting out of bed       Plan  · Scheduled tylenol, max 3000mg per day  · PRN alternate Lidocaine patch, Voltaren gel   · Schedule OMT  · Return if no improvement in 2 weeks

## 2022-03-30 ENCOUNTER — TELEPHONE (OUTPATIENT)
Dept: FAMILY MEDICINE CLINIC | Facility: CLINIC | Age: 77
End: 2022-03-30

## 2022-03-30 NOTE — TELEPHONE ENCOUNTER
Jase Smith patient's daughter but she did not want to schedule until she spoke with her mother  Will call back tomorrow

## 2022-05-12 ENCOUNTER — TELEPHONE (OUTPATIENT)
Dept: FAMILY MEDICINE CLINIC | Facility: CLINIC | Age: 77
End: 2022-05-12

## 2022-06-06 ENCOUNTER — RA CDI HCC (OUTPATIENT)
Dept: OTHER | Facility: HOSPITAL | Age: 77
End: 2022-06-06

## 2022-06-06 NOTE — PROGRESS NOTES
[facility] 75  coding opportunities          Chart Reviewed number of suggestions sent to Provider: 1  Rosalino, DO Samina Leggett  I have reviewed the recommendation(s) and do not accept the change(s) suggested     Based on clinical documentation indicated in your record, it appears that the patient may have the following conditions:    Can depression be further specified as one of the following? F33 0 Major depressive disorder, recurrent, mild  OR  F33 1 Major depressive disorder, recurrent, moderate  OR  F33 41 Major depressive disorder, recurrent, in partial remission  OR  F33 42 Major depressive disorder, recurrent in full remission  OR  F33 8 Other recurrent depressive disorders      If this is correct, please document and assess at your next visit   6/7/22       Patients Insurance     Medicare Insurance: [employer] [facility]

## 2022-06-28 ENCOUNTER — OFFICE VISIT (OUTPATIENT)
Dept: INTERNAL MEDICINE CLINIC | Facility: CLINIC | Age: 77
End: 2022-06-28
Payer: COMMERCIAL

## 2022-06-28 VITALS
RESPIRATION RATE: 16 BRPM | HEART RATE: 65 BPM | HEIGHT: 64 IN | WEIGHT: 147.2 LBS | SYSTOLIC BLOOD PRESSURE: 144 MMHG | OXYGEN SATURATION: 97 % | BODY MASS INDEX: 25.13 KG/M2 | TEMPERATURE: 97.2 F | DIASTOLIC BLOOD PRESSURE: 62 MMHG

## 2022-06-28 DIAGNOSIS — F41.9 ANXIETY DISORDER, UNSPECIFIED TYPE: ICD-10-CM

## 2022-06-28 DIAGNOSIS — N39.46 MIXED INCONTINENCE: ICD-10-CM

## 2022-06-28 DIAGNOSIS — E03.8 SUBCLINICAL HYPOTHYROIDISM: ICD-10-CM

## 2022-06-28 DIAGNOSIS — D64.9 ANEMIA, UNSPECIFIED TYPE: ICD-10-CM

## 2022-06-28 DIAGNOSIS — I10 HYPERTENSION, UNSPECIFIED TYPE: Primary | ICD-10-CM

## 2022-06-28 DIAGNOSIS — E78.00 HYPERCHOLESTEROLEMIA: ICD-10-CM

## 2022-06-28 PROCEDURE — 99204 OFFICE O/P NEW MOD 45 MIN: CPT | Performed by: INTERNAL MEDICINE

## 2022-06-28 NOTE — PATIENT INSTRUCTIONS
Please schedule your Medicare wellness examination within 1 month  Please get your lab work before your next visit

## 2022-06-28 NOTE — PROGRESS NOTES
INTERNAL MEDICINE OFFICE VISIT  [facility]     ASSESSMENT/PLAN:    No problem-specific Assessment & Plan notes found for this encounter  Diagnoses and all orders for this visit:    Hypertension, unspecified type  -     Comprehensive metabolic panel; Future    Subclinical hypothyroidism  -     TSH, 3rd generation with Free T4 reflex; Future    Hypercholesterolemia  -     Lipid Panel with Direct LDL reflex; Future    Anemia, unspecified type  -     CBC and Platelet; Future    Anxiety disorder, unspecified type    Mixed incontinence    1  HTN  · Blood pressure 144/62  · Patient reports occasional dizziness but no syncope  · Advised slow to rise from seated position  · Continue lisinopril-HCTZ    2  HLD  · Check lipid panel  · Advised balanced diet, low in processed food and fat    3  Subclinical hypothyroidism  · Check TSH     4  Anemia  · History of anemia, check CBC    5  Anxiety  · Daughter reports a history of anxiety and depression, worsened by death of patient's son 2 years ago  · Due to insurance change, is now in between psychiatrists  Will meet new psychiatrist (Dr Loulou Red) this Thursday  · Reports Prozac bid? Advised her to discuss regimen with new psychiatrist as this is usually daily medication  · Requesting a small refill of xanax to last her to her appointment   · Interested and willing to wean off xanax, will need to address in future      6  Mixed incontinence  · Follows with [facility]  · S/p MALCOLM-BSO and midurethral sling  · Hx of hysterectomy   · Continue trospium chloride     Asked patient to schedule her Medicare Wellness appointment soon  SUBJECTIVE     Patient ID: Danna Zamora is a 68 y o  female  Patient presents to establish care  She has occasional dizziness with standing but no current symptoms  Advised her to get up slowly when standing  Rest of plan above   Daughter present to help provide history    Vision - reported as decreased, cataracts  Hearing - no complaints  Falls - has a history in the past; has knee DJD for which she gets occasional steroid injections with Orthopedics, has history of cataracts, lives with family  iALDs - manages self care and cooking at home; lives with daughter  No history of tobacco use, ETOH, drug use  Family history - mother (HTN, emphysema), rest unknown      Consultant team includes: orthopedic surgeon, pyschiatry, dermatology, cardiology, urogyncology        The following portions of the patient's history were reviewed and updated as appropriate: allergies, current medications, past family history, past medical history, past social history, past surgical history and problem list     Review of Systems   Constitutional: Negative for chills and fever  Respiratory: Negative for shortness of breath  Cardiovascular: Negative for chest pain  Gastrointestinal: Negative for abdominal pain  Genitourinary: Negative for difficulty urinating  Musculoskeletal: Positive for arthralgias  Skin: Negative for rash  Neurological: Positive for dizziness  Negative for syncope  Psychiatric/Behavioral: Negative for agitation  OBJECTIVE:      /62   Pulse 65   Temp (!) 97 2 °F (36 2 °C)   Resp 16   Ht 5' 4" (1 626 m)   Wt 66 8 kg (147 lb 3 2 oz)   SpO2 97%   BMI 25 27 kg/m²        Physical Exam  Constitutional:       General: She is not in acute distress  Appearance: Normal appearance  HENT:      Head: Normocephalic and atraumatic  Cardiovascular:      Rate and Rhythm: Normal rate and regular rhythm  Heart sounds: Murmur heard  Pulmonary:      Effort: Pulmonary effort is normal       Breath sounds: Normal breath sounds  Abdominal:      General: Bowel sounds are normal       Palpations: Abdomen is soft  Tenderness: There is no abdominal tenderness  Musculoskeletal:      Right lower leg: No edema  Left lower leg: No edema     Skin:     General: Skin is warm and dry  Neurological:      General: No focal deficit present  Mental Status: She is alert and oriented to person, place, and time     Psychiatric:         Mood and Affect: Mood normal          Behavior: Behavior normal

## 2022-06-29 DIAGNOSIS — F41.9 ANXIETY DISORDER, UNSPECIFIED TYPE: Primary | ICD-10-CM

## 2022-06-29 RX ORDER — ALPRAZOLAM 0.25 MG/1
0.25 TABLET ORAL 2 TIMES DAILY PRN
Qty: 3 TABLET | Refills: 0 | Status: SHIPPED | OUTPATIENT
Start: 2022-06-29

## 2022-06-30 ENCOUNTER — TRANSCRIBE ORDERS (OUTPATIENT)
Dept: ADMINISTRATIVE | Facility: HOSPITAL | Age: 77
End: 2022-06-30

## 2022-06-30 DIAGNOSIS — D64.9 ANEMIA, UNSPECIFIED TYPE: ICD-10-CM

## 2022-06-30 DIAGNOSIS — Z78.0 ASYMPTOMATIC MENOPAUSAL STATE: Primary | ICD-10-CM

## 2022-07-05 ENCOUNTER — APPOINTMENT (OUTPATIENT)
Dept: LAB | Facility: HOSPITAL | Age: 77
End: 2022-07-05
Payer: COMMERCIAL

## 2022-07-05 ENCOUNTER — TRANSCRIBE ORDERS (OUTPATIENT)
Dept: LAB | Facility: HOSPITAL | Age: 77
End: 2022-07-05

## 2022-07-05 DIAGNOSIS — F41.9 ANXIETY DISORDER, UNSPECIFIED TYPE: Primary | ICD-10-CM

## 2022-07-05 DIAGNOSIS — I10 HYPERTENSION, UNSPECIFIED TYPE: ICD-10-CM

## 2022-07-05 DIAGNOSIS — D64.9 ANEMIA, UNSPECIFIED TYPE: ICD-10-CM

## 2022-07-05 DIAGNOSIS — E03.8 SUBCLINICAL HYPOTHYROIDISM: ICD-10-CM

## 2022-07-05 DIAGNOSIS — F41.9 ANXIETY DISORDER, UNSPECIFIED TYPE: ICD-10-CM

## 2022-07-05 DIAGNOSIS — E80.6 HYPERBILIRUBINEMIA: ICD-10-CM

## 2022-07-05 DIAGNOSIS — E78.00 HYPERCHOLESTEROLEMIA: ICD-10-CM

## 2022-07-05 LAB
ALBUMIN SERPL BCP-MCNC: 3.7 G/DL (ref 3.5–5)
ALP SERPL-CCNC: 100 U/L (ref 46–116)
ALT SERPL W P-5'-P-CCNC: 15 U/L (ref 12–78)
ANION GAP SERPL CALCULATED.3IONS-SCNC: 5 MMOL/L (ref 4–13)
AST SERPL W P-5'-P-CCNC: 20 U/L (ref 5–45)
BASOPHILS # BLD AUTO: 0.06 THOUSANDS/ΜL (ref 0–0.1)
BASOPHILS NFR BLD AUTO: 1 % (ref 0–1)
BILIRUB SERPL-MCNC: 1.63 MG/DL (ref 0.2–1)
BUN SERPL-MCNC: 14 MG/DL (ref 5–25)
CALCIUM SERPL-MCNC: 9.2 MG/DL (ref 8.3–10.1)
CHLORIDE SERPL-SCNC: 103 MMOL/L (ref 100–108)
CHOLEST SERPL-MCNC: 143 MG/DL
CO2 SERPL-SCNC: 29 MMOL/L (ref 21–32)
CREAT SERPL-MCNC: 1.02 MG/DL (ref 0.6–1.3)
EOSINOPHIL # BLD AUTO: 0.25 THOUSAND/ΜL (ref 0–0.61)
EOSINOPHIL NFR BLD AUTO: 3 % (ref 0–6)
ERYTHROCYTE [DISTWIDTH] IN BLOOD BY AUTOMATED COUNT: 13.3 % (ref 11.6–15.1)
GFR SERPL CREATININE-BSD FRML MDRD: 53 ML/MIN/1.73SQ M
GLUCOSE P FAST SERPL-MCNC: 87 MG/DL (ref 65–99)
HCT VFR BLD AUTO: 35.8 % (ref 34.8–46.1)
HDLC SERPL-MCNC: 61 MG/DL
HGB BLD-MCNC: 11.4 G/DL (ref 11.5–15.4)
IMM GRANULOCYTES # BLD AUTO: 0.02 THOUSAND/UL (ref 0–0.2)
IMM GRANULOCYTES NFR BLD AUTO: 0 % (ref 0–2)
LDLC SERPL CALC-MCNC: 63 MG/DL (ref 0–100)
LYMPHOCYTES # BLD AUTO: 2.33 THOUSANDS/ΜL (ref 0.6–4.47)
LYMPHOCYTES NFR BLD AUTO: 28 % (ref 14–44)
MCH RBC QN AUTO: 27.9 PG (ref 26.8–34.3)
MCHC RBC AUTO-ENTMCNC: 31.8 G/DL (ref 31.4–37.4)
MCV RBC AUTO: 88 FL (ref 82–98)
MONOCYTES # BLD AUTO: 0.57 THOUSAND/ΜL (ref 0.17–1.22)
MONOCYTES NFR BLD AUTO: 7 % (ref 4–12)
NEUTROPHILS # BLD AUTO: 5.12 THOUSANDS/ΜL (ref 1.85–7.62)
NEUTS SEG NFR BLD AUTO: 61 % (ref 43–75)
NONHDLC SERPL-MCNC: 82 MG/DL
NRBC BLD AUTO-RTO: 0 /100 WBCS
PLATELET # BLD AUTO: 302 THOUSANDS/UL (ref 149–390)
PMV BLD AUTO: 10.1 FL (ref 8.9–12.7)
POTASSIUM SERPL-SCNC: 3.6 MMOL/L (ref 3.5–5.3)
PROT SERPL-MCNC: 7.3 G/DL (ref 6.4–8.2)
RBC # BLD AUTO: 4.09 MILLION/UL (ref 3.81–5.12)
SODIUM SERPL-SCNC: 137 MMOL/L (ref 136–145)
TRIGL SERPL-MCNC: 94 MG/DL
WBC # BLD AUTO: 8.35 THOUSAND/UL (ref 4.31–10.16)

## 2022-07-05 PROCEDURE — 80061 LIPID PANEL: CPT

## 2022-07-05 PROCEDURE — 80053 COMPREHEN METABOLIC PANEL: CPT

## 2022-07-05 PROCEDURE — 85025 COMPLETE CBC W/AUTO DIFF WBC: CPT

## 2022-07-05 PROCEDURE — 36415 COLL VENOUS BLD VENIPUNCTURE: CPT

## 2022-07-28 ENCOUNTER — TELEPHONE (OUTPATIENT)
Dept: GASTROENTEROLOGY | Facility: AMBULARY SURGERY CENTER | Age: 77
End: 2022-07-28

## 2022-08-23 ENCOUNTER — APPOINTMENT (OUTPATIENT)
Dept: LAB | Facility: CLINIC | Age: 77
End: 2022-08-23
Payer: COMMERCIAL

## 2022-08-23 DIAGNOSIS — D64.9 ANEMIA, UNSPECIFIED TYPE: ICD-10-CM

## 2022-08-23 DIAGNOSIS — E80.6 OTHER DISORDERS OF BILIRUBIN METABOLISM: ICD-10-CM

## 2022-08-23 DIAGNOSIS — E80.6 HYPERBILIRUBINEMIA: ICD-10-CM

## 2022-08-23 LAB
ALBUMIN SERPL BCP-MCNC: 3.5 G/DL (ref 3.5–5)
ALP SERPL-CCNC: 98 U/L (ref 46–116)
ALT SERPL W P-5'-P-CCNC: 17 U/L (ref 12–78)
AST SERPL W P-5'-P-CCNC: 23 U/L (ref 5–45)
BASOPHILS # BLD AUTO: 0.06 THOUSANDS/ΜL (ref 0–0.1)
BASOPHILS NFR BLD AUTO: 1 % (ref 0–1)
BILIRUB DIRECT SERPL-MCNC: 0.16 MG/DL (ref 0–0.2)
BILIRUB SERPL-MCNC: 0.49 MG/DL (ref 0.2–1)
EOSINOPHIL # BLD AUTO: 0.24 THOUSAND/ΜL (ref 0–0.61)
EOSINOPHIL NFR BLD AUTO: 3 % (ref 0–6)
ERYTHROCYTE [DISTWIDTH] IN BLOOD BY AUTOMATED COUNT: 13.9 % (ref 11.6–15.1)
FERRITIN SERPL-MCNC: 16 NG/ML (ref 8–388)
FOLATE SERPL-MCNC: 18.7 NG/ML (ref 3.1–17.5)
HCT VFR BLD AUTO: 34.7 % (ref 34.8–46.1)
HGB BLD-MCNC: 11 G/DL (ref 11.5–15.4)
IMM GRANULOCYTES # BLD AUTO: 0.03 THOUSAND/UL (ref 0–0.2)
IMM GRANULOCYTES NFR BLD AUTO: 0 % (ref 0–2)
IRON SATN MFR SERPL: 22 % (ref 15–50)
IRON SERPL-MCNC: 70 UG/DL (ref 50–170)
LIPASE SERPL-CCNC: 154 U/L (ref 73–393)
LYMPHOCYTES # BLD AUTO: 2.41 THOUSANDS/ΜL (ref 0.6–4.47)
LYMPHOCYTES NFR BLD AUTO: 30 % (ref 14–44)
MCH RBC QN AUTO: 28.1 PG (ref 26.8–34.3)
MCHC RBC AUTO-ENTMCNC: 31.7 G/DL (ref 31.4–37.4)
MCV RBC AUTO: 89 FL (ref 82–98)
MONOCYTES # BLD AUTO: 0.63 THOUSAND/ΜL (ref 0.17–1.22)
MONOCYTES NFR BLD AUTO: 8 % (ref 4–12)
NEUTROPHILS # BLD AUTO: 4.76 THOUSANDS/ΜL (ref 1.85–7.62)
NEUTS SEG NFR BLD AUTO: 58 % (ref 43–75)
NRBC BLD AUTO-RTO: 0 /100 WBCS
PLATELET # BLD AUTO: 331 THOUSANDS/UL (ref 149–390)
PMV BLD AUTO: 10.1 FL (ref 8.9–12.7)
PROT SERPL-MCNC: 7.3 G/DL (ref 6.4–8.4)
RBC # BLD AUTO: 3.91 MILLION/UL (ref 3.81–5.12)
T4 FREE SERPL-MCNC: 1.01 NG/DL (ref 0.76–1.46)
TIBC SERPL-MCNC: 312 UG/DL (ref 250–450)
TSH SERPL DL<=0.05 MIU/L-ACNC: 2.76 UIU/ML (ref 0.45–4.5)
VIT B12 SERPL-MCNC: 507 PG/ML (ref 100–900)
WBC # BLD AUTO: 8.13 THOUSAND/UL (ref 4.31–10.16)

## 2022-08-23 PROCEDURE — 36415 COLL VENOUS BLD VENIPUNCTURE: CPT

## 2022-08-23 PROCEDURE — 84443 ASSAY THYROID STIM HORMONE: CPT

## 2022-08-23 PROCEDURE — 85025 COMPLETE CBC W/AUTO DIFF WBC: CPT

## 2022-08-23 PROCEDURE — 82607 VITAMIN B-12: CPT

## 2022-08-23 PROCEDURE — 83540 ASSAY OF IRON: CPT

## 2022-08-23 PROCEDURE — 82746 ASSAY OF FOLIC ACID SERUM: CPT

## 2022-08-23 PROCEDURE — 83550 IRON BINDING TEST: CPT

## 2022-08-23 PROCEDURE — 83690 ASSAY OF LIPASE: CPT

## 2022-08-23 PROCEDURE — 82728 ASSAY OF FERRITIN: CPT

## 2022-08-23 PROCEDURE — 80076 HEPATIC FUNCTION PANEL: CPT

## 2022-08-23 PROCEDURE — 84439 ASSAY OF FREE THYROXINE: CPT

## 2022-08-31 ENCOUNTER — APPOINTMENT (EMERGENCY)
Dept: RADIOLOGY | Facility: HOSPITAL | Age: 77
End: 2022-08-31
Payer: COMMERCIAL

## 2022-08-31 ENCOUNTER — HOSPITAL ENCOUNTER (EMERGENCY)
Facility: HOSPITAL | Age: 77
Discharge: HOME | End: 2022-09-01
Attending: EMERGENCY MEDICINE
Payer: COMMERCIAL

## 2022-08-31 ENCOUNTER — APPOINTMENT (OUTPATIENT)
Dept: RADIOLOGY | Facility: HOSPITAL | Age: 77
End: 2022-08-31
Payer: COMMERCIAL

## 2022-08-31 VITALS
RESPIRATION RATE: 18 BRPM | SYSTOLIC BLOOD PRESSURE: 174 MMHG | OXYGEN SATURATION: 98 % | HEART RATE: 74 BPM | TEMPERATURE: 98.4 F | DIASTOLIC BLOOD PRESSURE: 79 MMHG

## 2022-08-31 DIAGNOSIS — R07.9 CHEST PAIN, UNSPECIFIED: ICD-10-CM

## 2022-08-31 DIAGNOSIS — R10.9 ABDOMINAL PAIN: Primary | ICD-10-CM

## 2022-08-31 LAB
2HR DELTA HS TROPONIN: 0 NG/L
ALBUMIN SERPL BCP-MCNC: 4.1 G/DL (ref 3.5–5)
ALP SERPL-CCNC: 97 U/L (ref 46–116)
ALT SERPL W P-5'-P-CCNC: 20 U/L (ref 12–78)
ANION GAP SERPL CALCULATED.3IONS-SCNC: 9 MMOL/L (ref 4–13)
AST SERPL W P-5'-P-CCNC: 31 U/L (ref 5–45)
BASOPHILS # BLD AUTO: 0.04 THOUSANDS/ΜL (ref 0–0.1)
BASOPHILS NFR BLD AUTO: 0 % (ref 0–1)
BILIRUB SERPL-MCNC: 0.64 MG/DL (ref 0.2–1)
BUN SERPL-MCNC: 14 MG/DL (ref 5–25)
CALCIUM SERPL-MCNC: 9.4 MG/DL (ref 8.3–10.1)
CARDIAC TROPONIN I PNL SERPL HS: 8 NG/L
CARDIAC TROPONIN I PNL SERPL HS: 8 NG/L
CHLORIDE SERPL-SCNC: 99 MMOL/L (ref 96–108)
CO2 SERPL-SCNC: 24 MMOL/L (ref 21–32)
CREAT SERPL-MCNC: 0.92 MG/DL (ref 0.6–1.3)
EOSINOPHIL # BLD AUTO: 0.03 THOUSAND/ΜL (ref 0–0.61)
EOSINOPHIL NFR BLD AUTO: 0 % (ref 0–6)
ERYTHROCYTE [DISTWIDTH] IN BLOOD BY AUTOMATED COUNT: 13.4 % (ref 11.6–15.1)
GFR SERPL CREATININE-BSD FRML MDRD: 60 ML/MIN/1.73SQ M
GLUCOSE SERPL-MCNC: 98 MG/DL (ref 65–140)
HCT VFR BLD AUTO: 35.6 % (ref 34.8–46.1)
HGB BLD-MCNC: 11.9 G/DL (ref 11.5–15.4)
IMM GRANULOCYTES # BLD AUTO: 0.05 THOUSAND/UL (ref 0–0.2)
IMM GRANULOCYTES NFR BLD AUTO: 1 % (ref 0–2)
LIPASE SERPL-CCNC: 119 U/L (ref 73–393)
LYMPHOCYTES # BLD AUTO: 1.69 THOUSANDS/ΜL (ref 0.6–4.47)
LYMPHOCYTES NFR BLD AUTO: 16 % (ref 14–44)
MCH RBC QN AUTO: 28.5 PG (ref 26.8–34.3)
MCHC RBC AUTO-ENTMCNC: 33.4 G/DL (ref 31.4–37.4)
MCV RBC AUTO: 85 FL (ref 82–98)
MONOCYTES # BLD AUTO: 0.48 THOUSAND/ΜL (ref 0.17–1.22)
MONOCYTES NFR BLD AUTO: 4 % (ref 4–12)
NEUTROPHILS # BLD AUTO: 8.61 THOUSANDS/ΜL (ref 1.85–7.62)
NEUTS SEG NFR BLD AUTO: 79 % (ref 43–75)
NRBC BLD AUTO-RTO: 0 /100 WBCS
PLATELET # BLD AUTO: 320 THOUSANDS/UL (ref 149–390)
PMV BLD AUTO: 9.6 FL (ref 8.9–12.7)
POTASSIUM SERPL-SCNC: 3.8 MMOL/L (ref 3.5–5.3)
PROT SERPL-MCNC: 8 G/DL (ref 6.4–8.4)
RBC # BLD AUTO: 4.18 MILLION/UL (ref 3.81–5.12)
SODIUM SERPL-SCNC: 132 MMOL/L (ref 135–147)
WBC # BLD AUTO: 10.9 THOUSAND/UL (ref 4.31–10.16)

## 2022-08-31 PROCEDURE — 83690 ASSAY OF LIPASE: CPT

## 2022-08-31 PROCEDURE — 99285 EMERGENCY DEPT VISIT HI MDM: CPT

## 2022-08-31 PROCEDURE — 74177 CT ABD & PELVIS W/CONTRAST: CPT

## 2022-08-31 PROCEDURE — 99285 EMERGENCY DEPT VISIT HI MDM: CPT | Performed by: EMERGENCY MEDICINE

## 2022-08-31 PROCEDURE — 80053 COMPREHEN METABOLIC PANEL: CPT

## 2022-08-31 PROCEDURE — 36415 COLL VENOUS BLD VENIPUNCTURE: CPT

## 2022-08-31 PROCEDURE — G1004 CDSM NDSC: HCPCS

## 2022-08-31 PROCEDURE — 96374 THER/PROPH/DIAG INJ IV PUSH: CPT

## 2022-08-31 PROCEDURE — 84484 ASSAY OF TROPONIN QUANT: CPT

## 2022-08-31 PROCEDURE — 93005 ELECTROCARDIOGRAM TRACING: CPT

## 2022-08-31 PROCEDURE — 71045 X-RAY EXAM CHEST 1 VIEW: CPT

## 2022-08-31 PROCEDURE — 85025 COMPLETE CBC W/AUTO DIFF WBC: CPT

## 2022-08-31 RX ORDER — MAGNESIUM HYDROXIDE/ALUMINUM HYDROXICE/SIMETHICONE 120; 1200; 1200 MG/30ML; MG/30ML; MG/30ML
30 SUSPENSION ORAL ONCE
Status: COMPLETED | OUTPATIENT
Start: 2022-08-31 | End: 2022-08-31

## 2022-08-31 RX ORDER — ONDANSETRON 2 MG/ML
4 INJECTION INTRAMUSCULAR; INTRAVENOUS ONCE
Status: COMPLETED | OUTPATIENT
Start: 2022-08-31 | End: 2022-08-31

## 2022-08-31 RX ORDER — LIDOCAINE HYDROCHLORIDE 20 MG/ML
15 SOLUTION OROPHARYNGEAL ONCE
Status: COMPLETED | OUTPATIENT
Start: 2022-08-31 | End: 2022-08-31

## 2022-08-31 RX ADMIN — LIDOCAINE HYDROCHLORIDE 15 ML: 20 SOLUTION ORAL; TOPICAL at 22:41

## 2022-08-31 RX ADMIN — ONDANSETRON 4 MG: 2 INJECTION INTRAMUSCULAR; INTRAVENOUS at 21:43

## 2022-08-31 RX ADMIN — IOHEXOL 80 ML: 350 INJECTION, SOLUTION INTRAVENOUS at 22:07

## 2022-08-31 RX ADMIN — ALUMINA, MAGNESIA, AND SIMETHICONE ORAL SUSPENSION REGULAR STRENGTH 30 ML: 1200; 1200; 120 SUSPENSION ORAL at 22:41

## 2022-09-01 NOTE — ED ATTENDING ATTESTATION
8/31/2022  Leonardo MACIAS Mt, MD, saw and evaluated the patient  I have discussed the patient with the resident/non-physician practitioner and agree with the resident's/non-physician practitioner's findings, Plan of Care, and MDM as documented in the resident's/non-physician practitioner's note, except where noted  All available labs and Radiology studies were reviewed  I was present for key portions of any procedure(s) performed by the resident/non-physician practitioner and I was immediately available to provide assistance  At this point I agree with the current assessment done in the Emergency Department  I have conducted an independent evaluation of this patient a history and physical is as follows:    ED Course     63-year-old female presenting to the emergency department for evaluation of 2 complaints which seem to be unrelated  Patient reports 1 week history of intermittent epigastric abdominal discomfort  Improved with antacid medication  No associated fever vomiting  Unrelated to the 1st complaint is a episode of chest pain that occurred approximately a week ago  Chest pain has not recurred per the patient  No known alleviating or exacerbating factors  No diaphoresis  Ten systems reviewed and negative except as noted  The patient is resting comfortably on a stretcher in no acute respiratory distress  The patient appears nontoxic  HEENT reveals moist mucous membranes  Head is normocephalic and atraumatic  Conjunctiva and sclera are normal  Neck is nontender and supple with full range of motion to flexion, extension, lateral rotation  No meningismus appreciated  No masses are appreciated  Lungs are clear to auscultation bilaterally without any wheezes, rales or rhonchi  Heart is regular rate and rhythm without any murmurs, rubs or gallops  Abdomen is soft with mild epigastric abdominal tenderness without any rebound or guarding   Extremities appear grossly normal without any significant arthropathy  Patient is awake, alert, and oriented x3  The patient has normal interaction  Motor is 5 out of 5  Labs Reviewed   CBC AND DIFFERENTIAL - Abnormal       Result Value Ref Range Status    WBC 10 90 (*) 4 31 - 10 16 Thousand/uL Final    RBC 4 18  3 81 - 5 12 Million/uL Final    Hemoglobin 11 9  11 5 - 15 4 g/dL Final    Hematocrit 35 6  34 8 - 46 1 % Final    MCV 85  82 - 98 fL Final    MCH 28 5  26 8 - 34 3 pg Final    MCHC 33 4  31 4 - 37 4 g/dL Final    RDW 13 4  11 6 - 15 1 % Final    MPV 9 6  8 9 - 12 7 fL Final    Platelets 293  595 - 390 Thousands/uL Final    nRBC 0  /100 WBCs Final    Neutrophils Relative 79 (*) 43 - 75 % Final    Immat GRANS % 1  0 - 2 % Final    Lymphocytes Relative 16  14 - 44 % Final    Monocytes Relative 4  4 - 12 % Final    Eosinophils Relative 0  0 - 6 % Final    Basophils Relative 0  0 - 1 % Final    Neutrophils Absolute 8 61 (*) 1 85 - 7 62 Thousands/µL Final    Immature Grans Absolute 0 05  0 00 - 0 20 Thousand/uL Final    Lymphocytes Absolute 1 69  0 60 - 4 47 Thousands/µL Final    Monocytes Absolute 0 48  0 17 - 1 22 Thousand/µL Final    Eosinophils Absolute 0 03  0 00 - 0 61 Thousand/µL Final    Basophils Absolute 0 04  0 00 - 0 10 Thousands/µL Final   COMPREHENSIVE METABOLIC PANEL - Abnormal    Sodium 132 (*) 135 - 147 mmol/L Final    Potassium 3 8  3 5 - 5 3 mmol/L Final    Comment: Slightly Hemolyzed; Results May be Affected    Chloride 99  96 - 108 mmol/L Final    CO2 24  21 - 32 mmol/L Final    ANION GAP 9  4 - 13 mmol/L Final    BUN 14  5 - 25 mg/dL Final    Creatinine 0 92  0 60 - 1 30 mg/dL Final    Comment: Standardized to IDMS reference method    Glucose 98  65 - 140 mg/dL Final    Comment: If the patient is fasting, the ADA then defines impaired fasting glucose as > 100 mg/dL and diabetes as > or equal to 123 mg/dL  Specimen collection should occur prior to Sulfasalazine administration due to the potential for falsely depressed results  Specimen collection should occur prior to Sulfapyridine administration due to the potential for falsely elevated results  Calcium 9 4  8 3 - 10 1 mg/dL Final    AST 31  5 - 45 U/L Final    Comment: Slightly Hemolyzed; Results May be Affected  Specimen collection should occur prior to Sulfasalazine administration due to the potential for falsely depressed results  ALT 20  12 - 78 U/L Final    Comment: Specimen collection should occur prior to Sulfasalazine and/or Sulfapyridine administration due to the potential for falsely depressed results  Alkaline Phosphatase 97  46 - 116 U/L Final    Total Protein 8 0  6 4 - 8 4 g/dL Final    Albumin 4 1  3 5 - 5 0 g/dL Final    Total Bilirubin 0 64  0 20 - 1 00 mg/dL Final    Comment: Use of this assay is not recommended for patients undergoing treatment with eltrombopag due to the potential for falsely elevated results  eGFR 60  ml/min/1 73sq m Final    Narrative:     [employer] guidelines for Chronic Kidney Disease (CKD):     Stage 1 with normal or high GFR (GFR > 90 mL/min/1 73 square meters)    Stage 2 Mild CKD (GFR = 60-89 mL/min/1 73 square meters)    Stage 3A Moderate CKD (GFR = 45-59 mL/min/1 73 square meters)    Stage 3B Moderate CKD (GFR = 30-44 mL/min/1 73 square meters)    Stage 4 Severe CKD (GFR = 15-29 mL/min/1 73 square meters)    Stage 5 End Stage CKD (GFR <15 mL/min/1 73 square meters)  Note: GFR calculation is accurate only with a steady state creatinine   HS TROPONIN I 0HR - Normal    hs TnI 0hr 8  "Refer to ACS Flowchart"- see link ng/L Final    Comment:                                              Initial (time 0) result  If >=50 ng/L, Myocardial injury suggested ;  Type of myocardial injury and treatment strategy  to be determined  If 5-49 ng/L, a delta result at 2 hours and or 4 hours will be needed to further evaluate    If <4 ng/L, and chest pain has been >3 hours since onset, patient may qualify for discharge based on the HEART score in the ED  If <5 ng/L and <3hours since onset of chest pain, a delta result at 2 hours will be needed to further evaluate  HS Troponin 99th Percentile URL of a Health Population=12 ng/L with a 95% Confidence Interval of 8-18 ng/L  Second Troponin (time 2 hours)  If calculated delta >= 20 ng/L,  Myocardial injury suggested ; Type of myocardial injury and treatment strategy to be determined  If 5-49 ng/L and the calculated delta is 5-19 ng/L, consult medical service for evaluation  Continue evaluation for ischemia on ecg and other possible etiology and repeat hs troponin at 4 hours  If delta is <5 ng/L at 2 hours, consider discharge based on risk stratification via the HEART score (if in ED), or JEFFREY risk score in IP/Observation  HS Troponin 99th Percentile URL of a Health Population=12 ng/L with a 95% Confidence Interval of 8-18 ng/L  LIPASE - Normal    Lipase 119  73 - 393 u/L Final   HS TROPONIN I 2HR - Normal    hs TnI 2hr 8  "Refer to ACS Flowchart"- see link ng/L Final    Comment:                                              Initial (time 0) result  If >=50 ng/L, Myocardial injury suggested ;  Type of myocardial injury and treatment strategy  to be determined  If 5-49 ng/L, a delta result at 2 hours and or 4 hours will be needed to further evaluate  If <4 ng/L, and chest pain has been >3 hours since onset, patient may qualify for discharge based on the HEART score in the ED  If <5 ng/L and <3hours since onset of chest pain, a delta result at 2 hours will be needed to further evaluate  HS Troponin 99th Percentile URL of a Health Population=12 ng/L with a 95% Confidence Interval of 8-18 ng/L  Second Troponin (time 2 hours)  If calculated delta >= 20 ng/L,  Myocardial injury suggested ; Type of myocardial injury and treatment strategy to be determined  If 5-49 ng/L and the calculated delta is 5-19 ng/L, consult medical service for evaluation    Continue evaluation for ischemia on ecg and other possible etiology and repeat hs troponin at 4 hours  If delta is <5 ng/L at 2 hours, consider discharge based on risk stratification via the HEART score (if in ED), or JEFFREY risk score in IP/Observation  HS Troponin 99th Percentile URL of a Health Population=12 ng/L with a 95% Confidence Interval of 8-18 ng/L  Delta 2hr hsTnI 0  <20 ng/L Final   HS TROPONIN I 4HR       CT abdomen pelvis with contrast   Final Result      No evidence of acute intra-abdominal or pelvic process  Workstation performed: [id number]         XR chest 1 view portable   ED Interpretation   No acute cardiopulmonary disease                      Critical Care Time  Procedures

## 2022-09-01 NOTE — DISCHARGE INSTRUCTIONS
You were seen in the ED for chest pain and abdominal pain  Return to the ED for any worsening symptoms or new symptoms  Follow up with your primary care doctor as soon as possible

## 2022-09-01 NOTE — ED PROVIDER NOTES
History  Chief Complaint   Patient presents with    Abdominal Pain    Chest Pain     1300 onset of symptoms  Pt feels numb and tingly on left side of face   Nausea     77-year-old female patient with history of HLD, HTN, presenting with chest pain abdominal pain onset a week ago  Patient states that she has been having intermittent burning epigastric abdominal pain since a week  States she has been taking anti acids which has been helping her pain  Patient also states that she has left-sided poking chest pain  States her chest pain and abdominal pain are unrelated  Patient states her chest pain last episode was yesterday  Denies shortness of breath, fevers, vomiting, diarrhea, urinary symptoms but admits to nausea  Prior to Admission Medications   Prescriptions Last Dose Informant Patient Reported? Taking? ALPRAZolam (XANAX) 0 25 mg tablet   No No   Sig: Take 1 tablet (0 25 mg total) by mouth 2 (two) times a day as needed for anxiety   Diclofenac Sodium (VOLTAREN) 1 %   No No   Sig: Apply 2 g topically 4 (four) times a day   FLUAD 0 5 ML BERNABE  Self Yes No   Sig: inject 0 5 milliliter intramuscularly   Patient not taking: Reported on 6/28/2022   FLUoxetine (PROzac) 20 mg capsule  Self Yes No   aluminum chloride (DRYSOL) 20 % external solution  Self No No   Sig: Apply topically daily at bedtime   aluminum chloride (DRYSOL) 20 % external solution  Self No No   Sig: Apply topically daily at bedtime   atorvastatin (LIPITOR) 40 mg tablet   No No   Sig: Take 1 tablet (40 mg total) by mouth daily   ciclopirox (LOPROX) 0 77 % cream  Self Yes No   Sig: APPLY TO AFFECTED AREA UNDER THE BREAST AS NEEDED FOR FLARES     clobetasol (TEMOVATE) 0 05 % cream  Self No No   Sig: Apply 5 g (1 application total) topically 2 (two) times a day   cyclopentolate (CYCLOGYL) 1 % ophthalmic solution  Self Yes No   diltiazem (Dilt-XR) 240 MG 24 hr capsule   No No   Sig: Take 1 capsule (240 mg total) by mouth daily docusate sodium (Colace) 100 mg capsule   No No   Sig: Take 2 capsules (200 mg total) by mouth 2 (two) times a day   hydrocortisone 2 5 % cream  Self Yes No   Sig: Apply 1 application topically 2 (two) times a day   lisinopril-hydrochlorothiazide (PRINZIDE,ZESTORETIC) 20-12 5 MG per tablet   No No   Sig: Take 1 tablet by mouth daily   omeprazole (PriLOSEC) 40 MG capsule   No No   Sig: Take 1 capsule (40 mg total) by mouth daily   perphenazine 2 mg tablet  Self Yes No   Patient not taking: Reported on 6/28/2022   trospium chloride (SANCTURA) 20 mg tablet  Self Yes No   Sig: Take 20 mg by mouth 2 (two) times a day      Facility-Administered Medications: None       Past Medical History:   Diagnosis Date    Arthritis     Chest pain     Edema of lower extremity     Esophageal reflux     Hx of cardiac cath     Hypertension        Past Surgical History:   Procedure Laterality Date    BLADDER SURGERY         Family History   Problem Relation Age of Onset    COPD Mother     Emphysema Mother      I have reviewed and agree with the history as documented  E-Cigarette/Vaping    E-Cigarette Use Never User      E-Cigarette/Vaping Substances    Nicotine No     THC No     CBD No     Flavoring No     Other No     Unknown No      Social History     Tobacco Use    Smoking status: Never Smoker    Smokeless tobacco: Never Used   Vaping Use    Vaping Use: Never used   Substance Use Topics    Alcohol use: No    Drug use: Never        Review of Systems   Constitutional: Negative for chills, fatigue and fever  HENT: Negative for congestion, rhinorrhea and sore throat  Respiratory: Negative for cough, chest tightness and shortness of breath  Cardiovascular: Positive for chest pain  Negative for leg swelling  Gastrointestinal: Positive for abdominal pain and nausea  Negative for abdominal distention, diarrhea and vomiting  Genitourinary: Negative for difficulty urinating and dysuria     Musculoskeletal: Negative for arthralgias, back pain and myalgias  Skin: Negative for rash and wound  Neurological: Negative for dizziness, weakness and headaches  All other systems reviewed and are negative  Physical Exam  ED Triage Vitals [08/31/22 1836]   Temperature Pulse Respirations Blood Pressure SpO2   98 4 °F (36 9 °C) 75 18 (!) 187/88 99 %      Temp Source Heart Rate Source Patient Position - Orthostatic VS BP Location FiO2 (%)   Oral Monitor Sitting Right arm --      Pain Score       5             Orthostatic Vital Signs  Vitals:    08/31/22 1836 08/31/22 2130   BP: (!) 187/88 (!) 174/79   Pulse: 75 74   Patient Position - Orthostatic VS: Sitting        Physical Exam  Vitals reviewed  Constitutional:       Appearance: Normal appearance  HENT:      Head: Normocephalic and atraumatic  Nose: Nose normal       Mouth/Throat:      Mouth: Mucous membranes are moist       Pharynx: Oropharynx is clear  Eyes:      Extraocular Movements: Extraocular movements intact  Conjunctiva/sclera: Conjunctivae normal    Cardiovascular:      Rate and Rhythm: Normal rate and regular rhythm  Pulses: Normal pulses  Heart sounds: Normal heart sounds  Pulmonary:      Effort: Pulmonary effort is normal       Breath sounds: Normal breath sounds  Abdominal:      General: Bowel sounds are normal       Palpations: Abdomen is soft  Tenderness: There is generalized abdominal tenderness (Generalized worse on epigastric) and tenderness in the epigastric area  Musculoskeletal:         General: Normal range of motion  Cervical back: Normal range of motion  Skin:     General: Skin is warm and dry  Neurological:      General: No focal deficit present  Mental Status: She is alert and oriented to person, place, and time  Mental status is at baseline           ED Medications  Medications   ondansetron (ZOFRAN) injection 4 mg (4 mg Intravenous Given 8/31/22 2143)   iohexol (OMNIPAQUE) 350 MG/ML injection (SINGLE-DOSE) 100 mL (80 mL Intravenous Given 8/31/22 2207)   aluminum-magnesium hydroxide-simethicone (MYLANTA) oral suspension 30 mL (30 mL Oral Given 8/31/22 2241)   Lidocaine Viscous HCl (XYLOCAINE) 2 % mucosal solution 15 mL (15 mL Swish & Spit Given 8/31/22 2241)       Diagnostic Studies  Results Reviewed     Procedure Component Value Units Date/Time    HS Troponin I 2hr [[id number]]  (Normal) Collected: 08/31/22 2248    Lab Status: Final result Specimen: Blood from Arm, Left Updated: 08/31/22 2339     hs TnI 2hr 8 ng/L      Delta 2hr hsTnI 0 ng/L     HS Troponin 0hr (reflex protocol) [[id number]]  (Normal) Collected: 08/31/22 2104    Lab Status: Final result Specimen: Blood from Arm, Left Updated: 08/31/22 2149     hs TnI 0hr 8 ng/L     Comprehensive metabolic panel [[id number]]  (Abnormal) Collected: 08/31/22 2104    Lab Status: Final result Specimen: Blood from Arm, Left Updated: 08/31/22 2142     Sodium 132 mmol/L      Potassium 3 8 mmol/L      Chloride 99 mmol/L      CO2 24 mmol/L      ANION GAP 9 mmol/L      BUN 14 mg/dL      Creatinine 0 92 mg/dL      Glucose 98 mg/dL      Calcium 9 4 mg/dL      AST 31 U/L      ALT 20 U/L      Alkaline Phosphatase 97 U/L      Total Protein 8 0 g/dL      Albumin 4 1 g/dL      Total Bilirubin 0 64 mg/dL      eGFR 60 ml/min/1 73sq m     Narrative:      [place] guidelines for Chronic Kidney Disease (CKD):     Stage 1 with normal or high GFR (GFR > 90 mL/min/1 73 square meters)    Stage 2 Mild CKD (GFR = 60-89 mL/min/1 73 square meters)    Stage 3A Moderate CKD (GFR = 45-59 mL/min/1 73 square meters)    Stage 3B Moderate CKD (GFR = 30-44 mL/min/1 73 square meters)    Stage 4 Severe CKD (GFR = 15-29 mL/min/1 73 square meters)    Stage 5 End Stage CKD (GFR <15 mL/min/1 73 square meters)  Note: GFR calculation is accurate only with a steady state creatinine    Lipase [[id number]]  (Normal) Collected: 08/31/22 2104    Lab Status: Final result Specimen: Blood from Arm, Left Updated: 08/31/22 2142     Lipase 119 u/L     CBC and differential [[id number]]  (Abnormal) Collected: 08/31/22 2104    Lab Status: Final result Specimen: Blood from Arm, Left Updated: 08/31/22 2114     WBC 10 90 Thousand/uL      RBC 4 18 Million/uL      Hemoglobin 11 9 g/dL      Hematocrit 35 6 %      MCV 85 fL      MCH 28 5 pg      MCHC 33 4 g/dL      RDW 13 4 %      MPV 9 6 fL      Platelets 919 Thousands/uL      nRBC 0 /100 WBCs      Neutrophils Relative 79 %      Immat GRANS % 1 %      Lymphocytes Relative 16 %      Monocytes Relative 4 %      Eosinophils Relative 0 %      Basophils Relative 0 %      Neutrophils Absolute 8 61 Thousands/µL      Immature Grans Absolute 0 05 Thousand/uL      Lymphocytes Absolute 1 69 Thousands/µL      Monocytes Absolute 0 48 Thousand/µL      Eosinophils Absolute 0 03 Thousand/µL      Basophils Absolute 0 04 Thousands/µL                  CT abdomen pelvis with contrast   Final Result by Narendra Mcnamara MD (08/31 2257)      No evidence of acute intra-abdominal or pelvic process  Workstation performed: [id number]         XR chest 1 view portable   ED Interpretation by Elkin Bowles MD (08/31 2223)   No acute cardiopulmonary disease  Final Result by Edison Ponce MD (09/01 4379)      No acute cardiopulmonary disease                    Workstation performed: [id number]               Procedures  Procedures      ED Course             HEART Risk Score    Flowsheet Row Most Recent Value   Heart Score Risk Calculator    History 0 Filed at: 08/31/2022 2349   ECG 0 Filed at: 08/31/2022 2349   Age 2 Filed at: 08/31/2022 2349   Risk Factors 1 Filed at: 08/31/2022 2349   Troponin 0 Filed at: 08/31/2022 2349   HEART Score 3 Filed at: 08/31/2022 2349                                MDM  Number of Diagnoses or Management Options  Abdominal pain  Chest pain, unspecified  Diagnosis management comments: 79-year-old female patient presenting with intermittent chest pain and abdominal pain  Exam shows mild epigastric tenderness  CT abdomen and chest x-ray negative for acute process  Negative troponin, labs within normal limits  Likely gastritis  Patient treated with Zofran and GI cocktail with improvement of symptoms  Stable for discharge with return precautions given  Follow up with PCP  Amount and/or Complexity of Data Reviewed  Clinical lab tests: ordered and reviewed  Tests in the radiology section of CPT®: ordered and reviewed        Disposition  Final diagnoses:   Abdominal pain   Chest pain, unspecified     Time reflects when diagnosis was documented in both MDM as applicable and the Disposition within this note     Time User Action Codes Description Comment    8/31/2022 11:50 PM Shauna, [address] [R11 2] Nausea and vomiting     8/31/2022 11:51 PM Suzanne Morgan Seok Remove [R11 2] Nausea and vomiting     8/31/2022 11:51 PM Suzanne Morgan Seok Add [R10 9] Abdominal pain     8/31/2022 11:51 PM Suzannemima Morgan [facility] Add [R07 9] Chest pain, unspecified       ED Disposition     ED Disposition   Discharge    Condition   Stable    Date/Time   Wed Aug 31, 2022 11:51 PM    [address] discharge to home/self care                 Follow-up Information     Follow up With Specialties Details Why [address]  [facility], DO Internal Medicine Schedule an appointment as soon as possible for a visit   [address]  [phone]            Discharge Medication List as of 8/31/2022 11:52 PM      CONTINUE these medications which have NOT CHANGED    Details   ALPRAZolam (XANAX) 0 25 mg tablet Take 1 tablet (0 25 mg total) by mouth 2 (two) times a day as needed for anxiety, Starting Wed 6/29/2022, Normal      aluminum chloride (DRYSOL) 20 % external solution Apply topically daily at bedtime, Starting Tue 12/7/2021, Normal      atorvastatin (LIPITOR) 40 mg tablet Take 1 tablet (40 mg total) by mouth daily, Starting Thu 3/24/2022, Until Tue 6/28/2022, Normal      ciclopirox (LOPROX) 0 77 % cream APPLY TO AFFECTED AREA UNDER THE BREAST AS NEEDED FOR FLARES , Historical Med      clobetasol (TEMOVATE) 0 05 % cream Apply 5 g (1 application total) topically 2 (two) times a day, Starting Tue 3/16/2021, Normal      cyclopentolate (CYCLOGYL) 1 % ophthalmic solution Starting Wed 10/20/2021, Historical Med      Diclofenac Sodium (VOLTAREN) 1 % Apply 2 g topically 4 (four) times a day, Starting Thu 3/24/2022, Until Tue 6/28/2022, Normal      diltiazem (Dilt-XR) 240 MG 24 hr capsule Take 1 capsule (240 mg total) by mouth daily, Starting Thu 3/24/2022, Normal      docusate sodium (Colace) 100 mg capsule Take 2 capsules (200 mg total) by mouth 2 (two) times a day, Starting Thu 3/24/2022, Until Tue 6/28/2022, Normal      FLUAD 0 5 ML BERNABE inject 0 5 milliliter intramuscularly, Historical Med      FLUoxetine (PROzac) 20 mg capsule Starting Tue 4/17/2018, Historical Med      hydrocortisone 2 5 % cream Apply 1 application topically 2 (two) times a day, Starting Fri 3/30/2018, Historical Med      lisinopril-hydrochlorothiazide (PRINZIDE,ZESTORETIC) 20-12 5 MG per tablet Take 1 tablet by mouth daily, Starting Thu 3/24/2022, Until Tue 6/28/2022, Normal      omeprazole (PriLOSEC) 40 MG capsule Take 1 capsule (40 mg total) by mouth daily, Starting Thu 3/24/2022, Until Tue 6/28/2022, Normal      perphenazine 2 mg tablet Starting Tue 10/26/2021, Historical Med      trospium chloride (SANCTURA) 20 mg tablet Take 20 mg by mouth 2 (two) times a day, Historical Med           No discharge procedures on file  PDMP Review       Value Time User    PDMP Reviewed  Yes 6/28/2022  4:17 PM Adam Leblanc MD           ED Provider  Attending physically available and evaluated [facility]  I managed the patient along with the ED Attending      Electronically Signed by         Christopher Garrison MD  09/02/22 8911

## 2022-09-02 LAB
ATRIAL RATE: 78 BPM
P AXIS: 64 DEGREES
PR INTERVAL: 196 MS
QRS AXIS: 48 DEGREES
QRSD INTERVAL: 146 MS
QT INTERVAL: 410 MS
QTC INTERVAL: 467 MS
T WAVE AXIS: 12 DEGREES
VENTRICULAR RATE: 78 BPM

## 2022-09-02 PROCEDURE — 93010 ELECTROCARDIOGRAM REPORT: CPT | Performed by: INTERNAL MEDICINE

## 2022-09-27 ENCOUNTER — OFFICE VISIT (OUTPATIENT)
Dept: GASTROENTEROLOGY | Facility: AMBULARY SURGERY CENTER | Age: 77
End: 2022-09-27
Payer: COMMERCIAL

## 2022-09-27 VITALS
OXYGEN SATURATION: 99 % | HEIGHT: 64 IN | SYSTOLIC BLOOD PRESSURE: 150 MMHG | BODY MASS INDEX: 25.47 KG/M2 | HEART RATE: 52 BPM | WEIGHT: 149.2 LBS | DIASTOLIC BLOOD PRESSURE: 72 MMHG

## 2022-09-27 DIAGNOSIS — Z12.11 COLON CANCER SCREENING: ICD-10-CM

## 2022-09-27 DIAGNOSIS — R13.19 ESOPHAGEAL DYSPHAGIA: ICD-10-CM

## 2022-09-27 DIAGNOSIS — K21.9 GASTROESOPHAGEAL REFLUX DISEASE, UNSPECIFIED WHETHER ESOPHAGITIS PRESENT: Primary | ICD-10-CM

## 2022-09-27 PROCEDURE — 3077F SYST BP >= 140 MM HG: CPT | Performed by: INTERNAL MEDICINE

## 2022-09-27 PROCEDURE — 3078F DIAST BP <80 MM HG: CPT | Performed by: INTERNAL MEDICINE

## 2022-09-27 PROCEDURE — 99214 OFFICE O/P EST MOD 30 MIN: CPT | Performed by: INTERNAL MEDICINE

## 2022-09-27 PROCEDURE — 1160F RVW MEDS BY RX/DR IN RCRD: CPT | Performed by: INTERNAL MEDICINE

## 2022-09-27 RX ORDER — SODIUM PICOSULFATE, MAGNESIUM OXIDE, AND ANHYDROUS CITRIC ACID 10; 3.5; 12 MG/160ML; G/160ML; G/160ML
LIQUID ORAL
Qty: 320 ML | Refills: 0 | Status: SHIPPED | OUTPATIENT
Start: 2022-09-27

## 2022-09-27 RX ORDER — FAMOTIDINE 40 MG/1
40 TABLET, FILM COATED ORAL DAILY
Qty: 30 TABLET | Refills: 3 | Status: SHIPPED | OUTPATIENT
Start: 2022-09-27

## 2022-09-27 NOTE — PROGRESS NOTES
[facility]  Progress Note - Joey Kerr 68 y o  female MRN: [id number]    Unit/Bed#:  Encounter: [id number]    Assessment/Plan:    1  GERD-chronic symptoms, currently having breakthrough symptoms intermittently despite being on omeprazole 40 mg on daily basis  -add famotidine 40 mg at bedtime     -continue to avoid NSAIDs  Use acetaminophen when possible     -given chronicity of symptoms, symptoms of intermittent dysphagia, would recommend endoscopic evaluation to assess for Gilliland's esophagus and to assess for esophageal stricture/Schatzki's ring     - Patient was explained about the lifestyle and dietary modifications  Advised to avoid fatty foods, chocolates, caffeine, alcohol and any other triggering foods  Avoid eating for at least 3 hours before going to bed  2  Colon cancer screening-overdue, last colonoscopy was over 10 years ago, does not recall having had polyps  No family history of GI malignancy  Asymptomatic     -will schedule average risk screening colonoscopy at this time  If this colonoscopy is unremarkable, no further colonoscopy is needed based on the recent guidelines  - Patient was explained about  the risks and benefits of the procedure  Risks including but not limited to bleeding, infection, perforation were explained in detail  Also explained about less than 100% sensitivity with the exam and other alternatives  -prep sent to the pharmacy, if not covered, would recommend MiraLax/Dulcolax prep  Subjective: This is a 60-year-old female with history of GERD, who was last seen in 2020 by Dr Chao Amor presents for follow-up  Patient reports that she has had longstanding symptoms of GERD, reports that they are mostly controlled with omeprazole however she have some breakthrough symptoms  She also reports intermittent dysphagia with solids, denies any hematemesis, coffee-ground emesis or melena  She denies any unintentional weight loss  No melena or hematochezia  She denies any change in bowel habits or lower abdominal pain  Denies any family history of GI malignancy  Patient reports that she last underwent EGD and colonoscopy over 10 years ago  She does not recall having had polyps  Objective:     Vitals: There were no vitals taken for this visit  ,There is no height or weight on file to calculate BMI  [unfilled]    Physical Exam:    GEN: wn/wd, NAD  HEENT: MMM, no cervical or supraclavicular LAD, anciteric  CV: RRR, no m/r/g  CHEST: CTA b/l, no w/r/r  ABD: +BS, soft, NT/ND, no hepatosplenomegaly  EXT: no c/c/e  SKIN: no rashes  NEURO: aaox3      Invasive Devices  Report    None                         Lab, Imaging and other studies:     No visits with results within 1 Day(s) from this visit     Latest known visit with results is:   Admission on 08/31/2022, Discharged on 09/01/2022   Component Date Value    Ventricular Rate 08/31/2022 78     Atrial Rate 08/31/2022 78     NH Interval 08/31/2022 196     QRSD Interval 08/31/2022 146     QT Interval 08/31/2022 410     QTC Interval 08/31/2022 467     P Axis 08/31/2022 64     QRS Axis 08/31/2022 48     T Wave Axis 08/31/2022 12     WBC 08/31/2022 10 90 (A)    RBC 08/31/2022 4 18     Hemoglobin 08/31/2022 11 9     Hematocrit 08/31/2022 35 6     MCV 08/31/2022 85     MCH 08/31/2022 28 5     MCHC 08/31/2022 33 4     RDW 08/31/2022 13 4     MPV 08/31/2022 9 6     Platelets 50/36/2596 320     nRBC 08/31/2022 0     Neutrophils Relative 08/31/2022 79 (A)    Immat GRANS % 08/31/2022 1     Lymphocytes Relative 08/31/2022 16     Monocytes Relative 08/31/2022 4     Eosinophils Relative 08/31/2022 0     Basophils Relative 08/31/2022 0     Neutrophils Absolute 08/31/2022 8 61 (A)    Immature Grans Absolute 08/31/2022 0 05     Lymphocytes Absolute 08/31/2022 1 69     Monocytes Absolute 08/31/2022 0 48     Eosinophils Absolute 08/31/2022 0 03     Basophils Absolute 08/31/2022 0 04     Sodium 08/31/2022 132 (A)    Potassium 08/31/2022 3 8     Chloride 08/31/2022 99     CO2 08/31/2022 24     ANION GAP 08/31/2022 9     BUN 08/31/2022 14     Creatinine 08/31/2022 0 92     Glucose 08/31/2022 98     Calcium 08/31/2022 9 4     AST 08/31/2022 31     ALT 08/31/2022 20     Alkaline Phosphatase 08/31/2022 97     Total Protein 08/31/2022 8 0     Albumin 08/31/2022 4 1     Total Bilirubin 08/31/2022 0 64     eGFR 08/31/2022 60     hs TnI 0hr 08/31/2022 8     Lipase 08/31/2022 119     hs TnI 2hr 08/31/2022 8     Delta 2hr hsTnI 08/31/2022 0          I have personally reviewed pertinent films in PACS    No current facility-administered medications for this visit

## 2022-09-27 NOTE — LETTER
September 27, 2022     Zhang Martin  [address] [place] [address]    Patient: Armando Lakhani   YOB: 1945   Date of Visit: 9/27/2022       Dear Dr Daniel Wheat: Thank you for referring Sally Case to me for evaluation  Below are my notes for this consultation  If you have questions, please do not hesitate to call me  I look forward to following your patient along with you  Sincerely,        Finesse Sol MD        CC: No Recipients  Finesse Sol MD  9/27/2022  4:17 PM  Sign when Signing Visit  [facility]  Progress Note - Armando Lakhani 68 y o  female MRN: [id number]    Unit/Bed#:  Encounter: [id number]    Assessment/Plan:    1  GERD-chronic symptoms, currently having breakthrough symptoms intermittently despite being on omeprazole 40 mg on daily basis  -add famotidine 40 mg at bedtime     -continue to avoid NSAIDs  Use acetaminophen when possible     -given chronicity of symptoms, symptoms of intermittent dysphagia, would recommend endoscopic evaluation to assess for Gilliland's esophagus and to assess for esophageal stricture/Schatzki's ring     - Patient was explained about the lifestyle and dietary modifications  Advised to avoid fatty foods, chocolates, caffeine, alcohol and any other triggering foods  Avoid eating for at least 3 hours before going to bed  2  Colon cancer screening-overdue, last colonoscopy was over 10 years ago, does not recall having had polyps  No family history of GI malignancy  Asymptomatic     -will schedule average risk screening colonoscopy at this time  If this colonoscopy is unremarkable, no further colonoscopy is needed based on the recent guidelines  - Patient was explained about  the risks and benefits of the procedure  Risks including but not limited to bleeding, infection, perforation were explained in detail   Also explained about less than 100% sensitivity with the exam and other alternatives  -prep sent to the pharmacy, if not covered, would recommend MiraLax/Dulcolax prep  Subjective: This is a 63-year-old female with history of GERD, who was last seen in 2020 by Dr Claudio Rodriguez presents for follow-up  Patient reports that she has had longstanding symptoms of GERD, reports that they are mostly controlled with omeprazole however she have some breakthrough symptoms  She also reports intermittent dysphagia with solids, denies any hematemesis, coffee-ground emesis or melena  She denies any unintentional weight loss  No melena or hematochezia  She denies any change in bowel habits or lower abdominal pain  Denies any family history of GI malignancy  Patient reports that she last underwent EGD and colonoscopy over 10 years ago  She does not recall having had polyps  Objective:     Vitals: There were no vitals taken for this visit  ,There is no height or weight on file to calculate BMI  [unfilled]    Physical Exam:    GEN: wn/wd, NAD  HEENT: MMM, no cervical or supraclavicular LAD, anciteric  CV: RRR, no m/r/g  CHEST: CTA b/l, no w/r/r  ABD: +BS, soft, NT/ND, no hepatosplenomegaly  EXT: no c/c/e  SKIN: no rashes  NEURO: aaox3      Invasive Devices  Report    None                         Lab, Imaging and other studies:     No visits with results within 1 Day(s) from this visit     Latest known visit with results is:   Admission on 08/31/2022, Discharged on 09/01/2022   Component Date Value    Ventricular Rate 08/31/2022 78     Atrial Rate 08/31/2022 78     MO Interval 08/31/2022 196     QRSD Interval 08/31/2022 146     QT Interval 08/31/2022 410     QTC Interval 08/31/2022 467     P Axis 08/31/2022 64     QRS Axis 08/31/2022 48     T Wave Axis 08/31/2022 12     WBC 08/31/2022 10 90 (A)    RBC 08/31/2022 4 18     Hemoglobin 08/31/2022 11 9     Hematocrit 08/31/2022 35 6     MCV 08/31/2022 85     MCH 08/31/2022 28 5     MCHC 08/31/2022 33 4     RDW 08/31/2022 13 4     MPV 08/31/2022 9 6     Platelets 61/59/7335 320     nRBC 08/31/2022 0     Neutrophils Relative 08/31/2022 79 (A)    Immat GRANS % 08/31/2022 1     Lymphocytes Relative 08/31/2022 16     Monocytes Relative 08/31/2022 4     Eosinophils Relative 08/31/2022 0     Basophils Relative 08/31/2022 0     Neutrophils Absolute 08/31/2022 8 61 (A)    Immature Grans Absolute 08/31/2022 0 05     Lymphocytes Absolute 08/31/2022 1 69     Monocytes Absolute 08/31/2022 0 48     Eosinophils Absolute 08/31/2022 0 03     Basophils Absolute 08/31/2022 0 04     Sodium 08/31/2022 132 (A)    Potassium 08/31/2022 3 8     Chloride 08/31/2022 99     CO2 08/31/2022 24     ANION GAP 08/31/2022 9     BUN 08/31/2022 14     Creatinine 08/31/2022 0 92     Glucose 08/31/2022 98     Calcium 08/31/2022 9 4     AST 08/31/2022 31     ALT 08/31/2022 20     Alkaline Phosphatase 08/31/2022 97     Total Protein 08/31/2022 8 0     Albumin 08/31/2022 4 1     Total Bilirubin 08/31/2022 0 64     eGFR 08/31/2022 60     hs TnI 0hr 08/31/2022 8     Lipase 08/31/2022 119     hs TnI 2hr 08/31/2022 8     Delta 2hr hsTnI 08/31/2022 0          I have personally reviewed pertinent films in PACS    No current facility-administered medications for this visit

## 2022-09-28 NOTE — PATIENT INSTRUCTIONS
Scheduled date of EGD/colonoscopy (as of today):1/6/2023  Physician performing EGD/colonoscopy:DR GONZALEZ [facility]  Location of EGD/colonoscopy:[facility]  Desired bowel prep reviewed with patient:MIRALAX/DULCOLAX PER DR MENENDEZ  Instructions reviewed with patient by:NIALL ROMERO  Clearances:

## 2022-11-26 PROBLEM — Z12.11 COLON CANCER SCREENING: Status: RESOLVED | Noted: 2021-05-03 | Resolved: 2022-11-26

## 2023-01-06 ENCOUNTER — ANESTHESIA EVENT (OUTPATIENT)
Dept: GASTROENTEROLOGY | Facility: AMBULARY SURGERY CENTER | Age: 78
End: 2023-01-06

## 2023-01-06 ENCOUNTER — ANESTHESIA (OUTPATIENT)
Dept: GASTROENTEROLOGY | Facility: AMBULARY SURGERY CENTER | Age: 78
End: 2023-01-06

## 2023-01-06 ENCOUNTER — HOSPITAL ENCOUNTER (OUTPATIENT)
Dept: GASTROENTEROLOGY | Facility: AMBULARY SURGERY CENTER | Age: 78
Setting detail: OUTPATIENT SURGERY
End: 2023-01-06
Attending: INTERNAL MEDICINE

## 2023-01-06 VITALS
OXYGEN SATURATION: 98 % | WEIGHT: 145 LBS | RESPIRATION RATE: 18 BRPM | TEMPERATURE: 97.9 F | HEART RATE: 72 BPM | DIASTOLIC BLOOD PRESSURE: 72 MMHG | BODY MASS INDEX: 24.75 KG/M2 | SYSTOLIC BLOOD PRESSURE: 154 MMHG | HEIGHT: 64 IN

## 2023-01-06 DIAGNOSIS — Z12.11 COLON CANCER SCREENING: ICD-10-CM

## 2023-01-06 DIAGNOSIS — K21.9 GASTROESOPHAGEAL REFLUX DISEASE, UNSPECIFIED WHETHER ESOPHAGITIS PRESENT: ICD-10-CM

## 2023-01-06 RX ORDER — SODIUM CHLORIDE, SODIUM LACTATE, POTASSIUM CHLORIDE, CALCIUM CHLORIDE 600; 310; 30; 20 MG/100ML; MG/100ML; MG/100ML; MG/100ML
INJECTION, SOLUTION INTRAVENOUS CONTINUOUS PRN
Status: DISCONTINUED | OUTPATIENT
Start: 2023-01-06 | End: 2023-01-06

## 2023-01-06 RX ORDER — LIDOCAINE HYDROCHLORIDE 10 MG/ML
INJECTION, SOLUTION EPIDURAL; INFILTRATION; INTRACAUDAL; PERINEURAL AS NEEDED
Status: DISCONTINUED | OUTPATIENT
Start: 2023-01-06 | End: 2023-01-06

## 2023-01-06 RX ORDER — GLYCOPYRROLATE 0.2 MG/ML
INJECTION INTRAMUSCULAR; INTRAVENOUS AS NEEDED
Status: DISCONTINUED | OUTPATIENT
Start: 2023-01-06 | End: 2023-01-06

## 2023-01-06 RX ORDER — FENTANYL CITRATE 50 UG/ML
INJECTION, SOLUTION INTRAMUSCULAR; INTRAVENOUS AS NEEDED
Status: DISCONTINUED | OUTPATIENT
Start: 2023-01-06 | End: 2023-01-06

## 2023-01-06 RX ORDER — PROPOFOL 10 MG/ML
INJECTION, EMULSION INTRAVENOUS AS NEEDED
Status: DISCONTINUED | OUTPATIENT
Start: 2023-01-06 | End: 2023-01-06

## 2023-01-06 RX ADMIN — PROPOFOL 80 MG: 10 INJECTION, EMULSION INTRAVENOUS at 11:21

## 2023-01-06 RX ADMIN — PROPOFOL 30 MG: 10 INJECTION, EMULSION INTRAVENOUS at 11:30

## 2023-01-06 RX ADMIN — GLYCOPYRROLATE 0.2 MCG: 0.2 INJECTION, SOLUTION INTRAMUSCULAR; INTRAVENOUS at 11:34

## 2023-01-06 RX ADMIN — SODIUM CHLORIDE, SODIUM LACTATE, POTASSIUM CHLORIDE, AND CALCIUM CHLORIDE: .6; .31; .03; .02 INJECTION, SOLUTION INTRAVENOUS at 11:07

## 2023-01-06 RX ADMIN — LIDOCAINE HYDROCHLORIDE 50 MG: 10 INJECTION, SOLUTION EPIDURAL; INFILTRATION; INTRACAUDAL; PERINEURAL at 11:21

## 2023-01-06 RX ADMIN — PROPOFOL 40 MG: 10 INJECTION, EMULSION INTRAVENOUS at 11:46

## 2023-01-06 RX ADMIN — PROPOFOL 30 MG: 10 INJECTION, EMULSION INTRAVENOUS at 11:26

## 2023-01-06 RX ADMIN — PROPOFOL 30 MG: 10 INJECTION, EMULSION INTRAVENOUS at 11:35

## 2023-01-06 RX ADMIN — GLYCOPYRROLATE 0.2 MCG: 0.2 INJECTION, SOLUTION INTRAMUSCULAR; INTRAVENOUS at 11:31

## 2023-01-06 RX ADMIN — PROPOFOL 30 MG: 10 INJECTION, EMULSION INTRAVENOUS at 11:39

## 2023-01-06 RX ADMIN — PROPOFOL 30 MG: 10 INJECTION, EMULSION INTRAVENOUS at 11:23

## 2023-01-06 RX ADMIN — FENTANYL CITRATE 25 MCG: 50 INJECTION, SOLUTION INTRAMUSCULAR; INTRAVENOUS at 11:21

## 2023-01-06 NOTE — ANESTHESIA POSTPROCEDURE EVALUATION
Post-Op Assessment Note    CV Status:  Stable  Pain Score: 0    Pain management: adequate     Mental Status:  Alert and awake   Hydration Status:  Euvolemic   PONV Controlled:  Controlled   Airway Patency:  Patent      Post Op Vitals Reviewed: Yes      Staff: CRNA         No notable events documented      BP   138/65   Temp   97 3   Pulse  82   Resp   16   SpO2   97

## 2023-01-06 NOTE — H&P
History and Physical -  [facility]  Maycol Ramos 68 y o  female MRN: [id number]    HPI: Maycol Ramos is a 68y o  year old female who presents for colon cancer screening and evaluation of GERD  Review of Systems    Historical Information   Past Medical History:   Diagnosis Date   • Arthritis    • Chest pain    • Edema of lower extremity    • Esophageal reflux    • Hx of cardiac cath    • Hypertension      Past Surgical History:   Procedure Laterality Date   • BLADDER SURGERY     • COLONOSCOPY     • EGD       Social History   Social History     Substance and Sexual Activity   Alcohol Use No     Social History     Substance and Sexual Activity   Drug Use Never     Social History     Tobacco Use   Smoking Status Never   Smokeless Tobacco Never     Family History   Problem Relation Age of Onset   • COPD Mother    • Emphysema Mother        Meds/Allergies     (Not in a hospital admission)      No Known Allergies    Objective     /71   Pulse 72 Comment: sr  Temp 97 9 °F (36 6 °C) (Temporal)   Resp 18   Ht 5' 4" (1 626 m)   Wt 65 8 kg (145 lb)   SpO2 99%   BMI 24 89 kg/m²       PHYSICAL EXAM    Gen: NAD  CV: RRR  CHEST: Clear  ABD: soft, NT/ND  EXT: no edema  Neuro: AAO      ASSESSMENT/PLAN:  This is a 68y o  year old female here for evaluation of GERD and colon cancer screening  PLAN:   Procedure: EGD and colonoscopy

## 2023-01-06 NOTE — ANESTHESIA PREPROCEDURE EVALUATION
Procedure:  COLONOSCOPY  EGD    Relevant Problems   CARDIO   (+) Heart murmur   (+) Hypercholesterolemia   (+) Hypertension      GI/HEPATIC   (+) Esophageal dysphagia   (+) GERD (gastroesophageal reflux disease)      MUSCULOSKELETAL   (+) Low back pain      NEURO/PSYCH   (+) Anxiety disorder   (+) Chronic right SI joint pain   (+) Depression        Physical Exam    Airway    Mallampati score: IV  TM Distance: >3 FB  Neck ROM: full     Dental   No notable dental hx     Cardiovascular  Murmur, Cardiovascular exam normal    Pulmonary  Pulmonary exam normal     Other Findings        Anesthesia Plan  ASA Score- 2     Anesthesia Type- IV sedation with anesthesia with ASA Monitors  Additional Monitors:   Airway Plan:           Plan Factors-Exercise tolerance (METS): >4 METS  Chart reviewed  Patient summary reviewed  Patient is not a current smoker  Induction- intravenous  Postoperative Plan-     Informed Consent- Anesthetic plan and risks discussed with patient

## 2023-01-15 DIAGNOSIS — K21.9 GASTROESOPHAGEAL REFLUX DISEASE, UNSPECIFIED WHETHER ESOPHAGITIS PRESENT: ICD-10-CM

## 2023-01-15 RX ORDER — FAMOTIDINE 40 MG/1
TABLET, FILM COATED ORAL
Qty: 30 TABLET | Refills: 3 | Status: SHIPPED | OUTPATIENT
Start: 2023-01-15

## 2023-01-26 ENCOUNTER — HOSPITAL ENCOUNTER (OUTPATIENT)
Dept: RADIOLOGY | Age: 78
Discharge: HOME | End: 2023-01-26

## 2023-01-26 DIAGNOSIS — Z78.0 ASYMPTOMATIC MENOPAUSAL STATE: ICD-10-CM

## 2023-04-27 ENCOUNTER — TELEPHONE (OUTPATIENT)
Dept: PAIN MEDICINE | Facility: CLINIC | Age: 78
End: 2023-04-27

## 2023-05-06 DIAGNOSIS — I10 HYPERTENSION, UNSPECIFIED TYPE: ICD-10-CM

## 2023-05-09 RX ORDER — LISINOPRIL AND HYDROCHLOROTHIAZIDE 20; 12.5 MG/1; MG/1
TABLET ORAL
Qty: 90 TABLET | Refills: 5 | Status: SHIPPED | OUTPATIENT
Start: 2023-05-09

## 2023-05-13 DIAGNOSIS — K21.9 GASTROESOPHAGEAL REFLUX DISEASE, UNSPECIFIED WHETHER ESOPHAGITIS PRESENT: ICD-10-CM

## 2023-05-13 RX ORDER — FAMOTIDINE 40 MG/1
TABLET, FILM COATED ORAL
Qty: 30 TABLET | Refills: 3 | Status: SHIPPED | OUTPATIENT
Start: 2023-05-13

## 2023-06-06 ENCOUNTER — APPOINTMENT (OUTPATIENT)
Dept: LAB | Facility: CLINIC | Age: 78
End: 2023-06-06
Payer: COMMERCIAL

## 2023-06-06 DIAGNOSIS — E78.5 HYPERLIPIDEMIA, UNSPECIFIED HYPERLIPIDEMIA TYPE: ICD-10-CM

## 2023-06-06 DIAGNOSIS — F41.9 ANXIETY DISORDER, UNSPECIFIED TYPE: ICD-10-CM

## 2023-06-06 LAB
ALBUMIN SERPL BCP-MCNC: 3.7 G/DL (ref 3.5–5)
ALP SERPL-CCNC: 86 U/L (ref 46–116)
ALT SERPL W P-5'-P-CCNC: 19 U/L (ref 12–78)
ANION GAP SERPL CALCULATED.3IONS-SCNC: 3 MMOL/L (ref 4–13)
AST SERPL W P-5'-P-CCNC: 17 U/L (ref 5–45)
BASOPHILS # BLD AUTO: 0.05 THOUSANDS/ÂΜL (ref 0–0.1)
BASOPHILS NFR BLD AUTO: 1 % (ref 0–1)
BILIRUB SERPL-MCNC: 0.48 MG/DL (ref 0.2–1)
BUN SERPL-MCNC: 14 MG/DL (ref 5–25)
CALCIUM SERPL-MCNC: 9.2 MG/DL (ref 8.3–10.1)
CHLORIDE SERPL-SCNC: 105 MMOL/L (ref 96–108)
CHOLEST SERPL-MCNC: 126 MG/DL
CO2 SERPL-SCNC: 29 MMOL/L (ref 21–32)
CREAT SERPL-MCNC: 0.97 MG/DL (ref 0.6–1.3)
EOSINOPHIL # BLD AUTO: 0.17 THOUSAND/ÂΜL (ref 0–0.61)
EOSINOPHIL NFR BLD AUTO: 2 % (ref 0–6)
ERYTHROCYTE [DISTWIDTH] IN BLOOD BY AUTOMATED COUNT: 13.5 % (ref 11.6–15.1)
GFR SERPL CREATININE-BSD FRML MDRD: 56 ML/MIN/1.73SQ M
GLUCOSE P FAST SERPL-MCNC: 89 MG/DL (ref 65–99)
HCT VFR BLD AUTO: 35.9 % (ref 34.8–46.1)
HDLC SERPL-MCNC: 67 MG/DL
HGB BLD-MCNC: 11.8 G/DL (ref 11.5–15.4)
IMM GRANULOCYTES # BLD AUTO: 0.03 THOUSAND/UL (ref 0–0.2)
IMM GRANULOCYTES NFR BLD AUTO: 0 % (ref 0–2)
LDLC SERPL CALC-MCNC: 44 MG/DL (ref 0–100)
LYMPHOCYTES # BLD AUTO: 2.45 THOUSANDS/ÂΜL (ref 0.6–4.47)
LYMPHOCYTES NFR BLD AUTO: 31 % (ref 14–44)
MCH RBC QN AUTO: 28.6 PG (ref 26.8–34.3)
MCHC RBC AUTO-ENTMCNC: 32.9 G/DL (ref 31.4–37.4)
MCV RBC AUTO: 87 FL (ref 82–98)
MONOCYTES # BLD AUTO: 0.64 THOUSAND/ÂΜL (ref 0.17–1.22)
MONOCYTES NFR BLD AUTO: 8 % (ref 4–12)
NEUTROPHILS # BLD AUTO: 4.68 THOUSANDS/ÂΜL (ref 1.85–7.62)
NEUTS SEG NFR BLD AUTO: 58 % (ref 43–75)
NONHDLC SERPL-MCNC: 59 MG/DL
NRBC BLD AUTO-RTO: 0 /100 WBCS
PLATELET # BLD AUTO: 305 THOUSANDS/UL (ref 149–390)
PMV BLD AUTO: 10.4 FL (ref 8.9–12.7)
POTASSIUM SERPL-SCNC: 3.3 MMOL/L (ref 3.5–5.3)
PROT SERPL-MCNC: 7.2 G/DL (ref 6.4–8.4)
RBC # BLD AUTO: 4.13 MILLION/UL (ref 3.81–5.12)
SODIUM SERPL-SCNC: 137 MMOL/L (ref 135–147)
T4 FREE SERPL-MCNC: 0.8 NG/DL (ref 0.61–1.12)
TRIGL SERPL-MCNC: 77 MG/DL
TSH SERPL DL<=0.05 MIU/L-ACNC: 3.39 UIU/ML (ref 0.45–4.5)
WBC # BLD AUTO: 8.02 THOUSAND/UL (ref 4.31–10.16)

## 2023-06-06 PROCEDURE — 80053 COMPREHEN METABOLIC PANEL: CPT

## 2023-06-06 PROCEDURE — 36415 COLL VENOUS BLD VENIPUNCTURE: CPT

## 2023-06-06 PROCEDURE — 85025 COMPLETE CBC W/AUTO DIFF WBC: CPT

## 2023-06-06 PROCEDURE — 80061 LIPID PANEL: CPT

## 2023-06-06 PROCEDURE — 84439 ASSAY OF FREE THYROXINE: CPT

## 2023-06-06 PROCEDURE — 84443 ASSAY THYROID STIM HORMONE: CPT

## 2023-07-12 ENCOUNTER — OFFICE VISIT (OUTPATIENT)
Dept: INTERNAL MEDICINE CLINIC | Facility: CLINIC | Age: 78
End: 2023-07-12
Payer: COMMERCIAL

## 2023-07-12 VITALS
DIASTOLIC BLOOD PRESSURE: 72 MMHG | WEIGHT: 149.2 LBS | OXYGEN SATURATION: 96 % | SYSTOLIC BLOOD PRESSURE: 134 MMHG | BODY MASS INDEX: 25.61 KG/M2 | HEART RATE: 57 BPM

## 2023-07-12 DIAGNOSIS — N18.31 STAGE 3A CHRONIC KIDNEY DISEASE (HCC): ICD-10-CM

## 2023-07-12 DIAGNOSIS — R21 RASH: ICD-10-CM

## 2023-07-12 DIAGNOSIS — R60.0 BILATERAL LEG EDEMA: Primary | ICD-10-CM

## 2023-07-12 PROCEDURE — 99214 OFFICE O/P EST MOD 30 MIN: CPT | Performed by: INTERNAL MEDICINE

## 2023-07-12 RX ORDER — NYSTATIN 100000 U/G
CREAM TOPICAL 2 TIMES DAILY
Qty: 30 G | Refills: 0 | Status: SHIPPED | OUTPATIENT
Start: 2023-07-12

## 2023-07-12 RX ORDER — FLUOXETINE HYDROCHLORIDE 20 MG/1
1 CAPSULE ORAL EVERY MORNING
COMMUNITY

## 2023-07-12 NOTE — ASSESSMENT & PLAN NOTE
Likely due to chronic venous insufficiency. Recent labs from June 6 reviewed, thyroid-stimulating hormone and thyroid hormone level were okay, GFR just slightly below normal at 56, sodium okay at 137. Patient has been on diltiazem for a long time with no recent increase in dose. Patient denies excessive salt intake. Cardiac echo April 9, 2020 was okay with ejection fraction 55% and just mild diastolic dysfunction. I recommend patient stay active with walking, elevate the legs 3 times a day for 30 minutes, can try compression stockings. Patient can also get some improvement of the swelling with the diuretic part of the lisinopril hydrochlorothiazide that she is on.   Follow up if not improving

## 2023-07-12 NOTE — PROGRESS NOTES
Name: Heraclio Cat      :       MRN: [id number]  Encounter Provider: Leyla De Leon MD  Encounter Date: 2023   [facility]     1. Bilateral leg edema  Assessment & Plan:  Likely due to chronic venous insufficiency. Recent labs from  reviewed, thyroid-stimulating hormone and thyroid hormone level were okay, GFR just slightly below normal at 56, sodium okay at 137. Patient has been on diltiazem for a long time with no recent increase in dose. Patient denies excessive salt intake. Cardiac echo 2020 was okay with ejection fraction 55% and just mild diastolic dysfunction. I recommend patient stay active with walking, elevate the legs 3 times a day for 30 minutes, can try compression stockings. Patient can also get some improvement of the swelling with the diuretic part of the lisinopril hydrochlorothiazide that she is on. Follow up if not improving    Orders:  -     Compression Stocking    2. Stage 3a chronic kidney disease [facility])  Assessment & Plan:  Lab Results   Component Value Date    EGFR 56 2023    EGFR 60 2022    EGFR 53 2022    CREATININE 0.97 2023    CREATININE 0.92 2022    CREATININE 1.02 2022   Last GFR 2023 was just slightly low at 56, avoid excessive use of NSAIDs, stay hydrated      3. Rash  -     nystatin (MYCOSTATIN) cream; Apply topically 2 (two) times a day           Subjective     Over the past two weeks pt has had some swelling in the ankles. No chest pain, no shortness of breath. She has not been walking as much lately. She denies excessive salt intake. She has been on diltiazem for a long time, no recent increase in dosage. Pt here with daughter. Ankle Swelling  Pertinent negatives include no chest pain, chills or fever. Review of Systems   Constitutional: Negative for chills and fever.    Respiratory: Negative for chest tightness and shortness of breath. Cardiovascular: Positive for leg swelling. Negative for chest pain. Past Medical History:   Diagnosis Date   • Arthritis    • Chest pain    • Edema of lower extremity    • Esophageal reflux    • Hx of cardiac cath    • Hypertension      Past Surgical History:   Procedure Laterality Date   • BLADDER SURGERY     • COLONOSCOPY     • EGD       Family History   Problem Relation Age of Onset   • COPD Mother    • Emphysema Mother    • Heart disease Sister    • No Known Problems Brother      Social History     Socioeconomic History   • Marital status: Single     Spouse name: None   • Number of children: None   • Years of education: None   • Highest education level: None   Occupational History   • None   Tobacco Use   • Smoking status: Never   • Smokeless tobacco: Never   Vaping Use   • Vaping Use: Never used   Substance and Sexual Activity   • Alcohol use: No   • Drug use: Never   • Sexual activity: Not Currently     Partners: Male   Other Topics Concern   • None   Social History Narrative    Caffeine use    Tea use     Social Determinants of Health     Financial Resource Strain: Low Risk  (3/16/2021)    Overall Financial Resource Strain (CARDIA)    • Difficulty of Paying Living Expenses: Not hard at all   Food Insecurity: No Food Insecurity (3/16/2021)    Hunger Vital Sign    • Worried About Running Out of Food in the Last Year: Never true    • Ran Out of Food in the Last Year: Never true   Transportation Needs: No Transportation Needs (3/16/2021)    PRAPARE - Transportation    • Lack of Transportation (Medical): No    • Lack of Transportation (Non-Medical):  No   Physical Activity: Not on file   Stress: Not on file   Social Connections: Not on file   Intimate Partner Violence: Not on file   Housing Stability: Not on file     Current Outpatient Medications on File Prior to Visit   Medication Sig   • ALPRAZolam (XANAX) 0.25 mg tablet Take 1 tablet (0.25 mg total) by mouth 2 (two) times a day as needed for anxiety   • atorvastatin (LIPITOR) 40 mg tablet Take 1 tablet (40 mg total) by mouth daily   • diltiazem (Dilt-XR) 240 MG 24 hr capsule Take 1 capsule (240 mg total) by mouth daily   • docusate sodium (Colace) 100 mg capsule Take 2 capsules (200 mg total) by mouth 2 (two) times a day   • famotidine (PEPCID) 40 MG tablet take 1 tablet by mouth once daily   • FLUoxetine (PROzac) 20 mg capsule Take 1 capsule by mouth every morning   • lisinopril-hydrochlorothiazide (PRINZIDE,ZESTORETIC) 20-12.5 MG per tablet take 1 tablet by mouth once daily   • perphenazine 2 mg tablet    • trospium chloride (SANCTURA) 20 mg tablet Take 20 mg by mouth 2 (two) times a day   • aluminum chloride (DRYSOL) 20 % external solution Apply topically daily at bedtime (Patient not taking: Reported on 9/27/2022)   • ciclopirox (LOPROX) 0.77 % cream APPLY TO AFFECTED AREA UNDER THE BREAST AS NEEDED FOR FLARES.  (Patient not taking: Reported on 7/12/2023)   • clobetasol (TEMOVATE) 0.05 % cream Apply 5 g (1 application total) topically 2 (two) times a day (Patient not taking: Reported on 7/12/2023)   • cyclopentolate (CYCLOGYL) 1 % ophthalmic solution  (Patient not taking: Reported on 9/27/2022)   • Diclofenac Sodium (VOLTAREN) 1 % Apply 2 g topically 4 (four) times a day   • FLUAD 0.5 ML BERNABE inject 0.5 milliliter intramuscularly (Patient not taking: Reported on 6/28/2022)   • hydrocortisone 2.5 % cream Apply 1 application topically 2 (two) times a day (Patient not taking: Reported on 7/12/2023)   • omeprazole (PriLOSEC) 40 MG capsule Take 1 capsule (40 mg total) by mouth daily   • [DISCONTINUED] aluminum chloride (DRYSOL) 20 % external solution Apply topically daily at bedtime     No Known Allergies  Immunization History   Administered Date(s) Administered   • COVID-19 MODERNA VACC 0.5 ML IM 03/02/2021, 03/30/2021   • INFLUENZA 10/27/2006, 11/11/2008, 09/29/2015, 10/19/2018, 09/20/2020, 10/02/2021   • Influenza Split High Dose Preservative Free IM 10/08/2016   • Influenza, seasonal, injectable 09/30/2013, 08/18/2014, 09/22/2017   • Pneumococcal Conjugate 13-Valent 05/23/2019   • Pneumococcal Polysaccharide PPV23 10/27/2006   • Tdap 05/23/2019   • Zoster Vaccine Recombinant 03/09/2020, 06/29/2020   • influenza, trivalent, adjuvanted 11/21/2019       Objective     /72   Pulse 57   Wt 67.7 kg (149 lb 3.2 oz)   SpO2 96%   BMI 25.61 kg/m²     Physical Exam  Constitutional:       General: She is not in acute distress. Appearance: Normal appearance. Cardiovascular:      Rate and Rhythm: Normal rate and regular rhythm. Heart sounds: Murmur (soft systolic) heard. Pulmonary:      Effort: Pulmonary effort is normal. No respiratory distress. Breath sounds: Normal breath sounds. No rales. Musculoskeletal:      Right lower leg: Edema (mild) present. Left lower leg: Edema (trace) present. Comments: Negative Rita's sign,   Neurological:      Mental Status: She is alert.        Darcie Andersen MD

## 2023-07-12 NOTE — PATIENT INSTRUCTIONS
Problem List Items Addressed This Visit          Genitourinary    Stage 3a chronic kidney disease [facility])     Lab Results   Component Value Date    EGFR 56 06/06/2023    EGFR 60 08/31/2022    EGFR 53 07/05/2022    CREATININE 0.97 06/06/2023    CREATININE 0.92 08/31/2022    CREATININE 1.02 07/05/2022   Last GFR June 6, 2023 was just slightly low at 56, avoid excessive use of NSAIDs, stay hydrated            Other    Bilateral leg edema - Primary     Likely due to chronic venous insufficiency. Recent labs from June 6 reviewed, thyroid-stimulating hormone and thyroid hormone level were okay, GFR just slightly below normal at 56, sodium okay at 137. Patient has been on diltiazem for a long time with no recent increase in dose. Patient denies excessive salt intake. Cardiac echo April 9, 2020 was okay with ejection fraction 55% and just mild diastolic dysfunction. I recommend patient stay active with walking, elevate the legs 3 times a day for 30 minutes, can try compression stockings. Patient can also get some improvement of the swelling with the diuretic part of the lisinopril hydrochlorothiazide that she is on.   Follow up if not improving         Relevant Orders    Compression Stocking

## 2023-07-12 NOTE — ASSESSMENT & PLAN NOTE
Lab Results   Component Value Date    EGFR 56 06/06/2023    EGFR 60 08/31/2022    EGFR 53 07/05/2022    CREATININE 0.97 06/06/2023    CREATININE 0.92 08/31/2022    CREATININE 1.02 07/05/2022   Last GFR June 6, 2023 was just slightly low at 56, avoid excessive use of NSAIDs, stay hydrated

## 2023-08-24 ENCOUNTER — RA CDI HCC (OUTPATIENT)
Dept: OTHER | Facility: HOSPITAL | Age: 78
End: 2023-08-24

## 2023-08-24 NOTE — PROGRESS NOTES
[address] [facility] coding opportunities          Chart Reviewed number of suggestions sent to Provider: 1    [facility] [place]     Patients Insurance     Medicare Insurance: [facility] Advantage

## 2023-08-29 ENCOUNTER — TRANSCRIBE ORDERS (OUTPATIENT)
Dept: LAB | Facility: CLINIC | Age: 78
End: 2023-08-29

## 2023-08-29 ENCOUNTER — APPOINTMENT (OUTPATIENT)
Dept: LAB | Facility: CLINIC | Age: 78
End: 2023-08-29
Payer: COMMERCIAL

## 2023-08-29 DIAGNOSIS — E87.6 HYPOKALEMIA: ICD-10-CM

## 2023-08-29 DIAGNOSIS — E87.6 HYPOKALEMIA: Primary | ICD-10-CM

## 2023-08-29 LAB
ALBUMIN SERPL BCP-MCNC: 4.1 G/DL (ref 3.5–5)
ALP SERPL-CCNC: 79 U/L (ref 34–104)
ALT SERPL W P-5'-P-CCNC: 10 U/L (ref 7–52)
ANION GAP SERPL CALCULATED.3IONS-SCNC: 8 MMOL/L
AST SERPL W P-5'-P-CCNC: 18 U/L (ref 13–39)
BILIRUB SERPL-MCNC: 0.51 MG/DL (ref 0.2–1)
BUN SERPL-MCNC: 17 MG/DL (ref 5–25)
CALCIUM SERPL-MCNC: 9.4 MG/DL (ref 8.4–10.2)
CHLORIDE SERPL-SCNC: 101 MMOL/L (ref 96–108)
CO2 SERPL-SCNC: 29 MMOL/L (ref 21–32)
CREAT SERPL-MCNC: 0.95 MG/DL (ref 0.6–1.3)
GFR SERPL CREATININE-BSD FRML MDRD: 57 ML/MIN/1.73SQ M
GLUCOSE P FAST SERPL-MCNC: 85 MG/DL (ref 65–99)
MAGNESIUM SERPL-MCNC: 2.1 MG/DL (ref 1.9–2.7)
POTASSIUM SERPL-SCNC: 3.4 MMOL/L (ref 3.5–5.3)
PROT SERPL-MCNC: 6.9 G/DL (ref 6.4–8.4)
SODIUM SERPL-SCNC: 138 MMOL/L (ref 135–147)

## 2023-08-29 PROCEDURE — 80053 COMPREHEN METABOLIC PANEL: CPT

## 2023-08-29 PROCEDURE — 36415 COLL VENOUS BLD VENIPUNCTURE: CPT

## 2023-08-29 PROCEDURE — 83735 ASSAY OF MAGNESIUM: CPT

## 2023-09-10 DIAGNOSIS — K21.9 GASTROESOPHAGEAL REFLUX DISEASE, UNSPECIFIED WHETHER ESOPHAGITIS PRESENT: ICD-10-CM

## 2023-09-11 RX ORDER — FAMOTIDINE 40 MG/1
TABLET, FILM COATED ORAL
Qty: 30 TABLET | Refills: 0 | Status: SHIPPED | OUTPATIENT
Start: 2023-09-11

## 2023-10-02 ENCOUNTER — TELEPHONE (OUTPATIENT)
Dept: INTERNAL MEDICINE CLINIC | Facility: CLINIC | Age: 78
End: 2023-10-02

## 2023-10-08 DIAGNOSIS — K21.9 GASTROESOPHAGEAL REFLUX DISEASE, UNSPECIFIED WHETHER ESOPHAGITIS PRESENT: ICD-10-CM

## 2023-10-09 RX ORDER — FAMOTIDINE 40 MG/1
TABLET, FILM COATED ORAL
Qty: 30 TABLET | Refills: 0 | Status: SHIPPED | OUTPATIENT
Start: 2023-10-09

## 2023-10-26 ENCOUNTER — HOSPITAL ENCOUNTER (EMERGENCY)
Facility: HOSPITAL | Age: 78
Discharge: HOME | End: 2023-10-26
Attending: EMERGENCY MEDICINE
Payer: COMMERCIAL

## 2023-10-26 VITALS
RESPIRATION RATE: 20 BRPM | DIASTOLIC BLOOD PRESSURE: 79 MMHG | SYSTOLIC BLOOD PRESSURE: 169 MMHG | OXYGEN SATURATION: 98 % | TEMPERATURE: 97.6 F | HEART RATE: 78 BPM

## 2023-10-26 DIAGNOSIS — L03.90 CELLULITIS: Primary | ICD-10-CM

## 2023-10-26 DIAGNOSIS — R60.9 PERIPHERAL EDEMA: ICD-10-CM

## 2023-10-26 PROCEDURE — 99284 EMERGENCY DEPT VISIT MOD MDM: CPT

## 2023-10-26 PROCEDURE — 99284 EMERGENCY DEPT VISIT MOD MDM: CPT | Performed by: EMERGENCY MEDICINE

## 2023-10-26 RX ORDER — CEPHALEXIN 500 MG/1
500 CAPSULE ORAL ONCE
Status: COMPLETED | OUTPATIENT
Start: 2023-10-26 | End: 2023-10-26

## 2023-10-26 RX ORDER — CEPHALEXIN 500 MG/1
500 CAPSULE ORAL EVERY 12 HOURS SCHEDULED
Qty: 14 CAPSULE | Refills: 0 | Status: SHIPPED | OUTPATIENT
Start: 2023-10-26 | End: 2023-11-02

## 2023-10-26 RX ADMIN — CEPHALEXIN 500 MG: 500 CAPSULE ORAL at 16:53

## 2023-10-26 NOTE — DISCHARGE INSTRUCTIONS
If your symptoms worsen after finishing the course of antibiotics, then please return to the ED or call your family doctor.

## 2023-10-26 NOTE — ED PROVIDER NOTES
History  Chief Complaint   Patient presents with    Edema     Pt reports "rash on B/L legs/feet." Appears red, swollen, and itchy. Denies heart problems     66 y.o F w/ chronic venous stasis edema of BLLE, HTN, presents to the ED due to her right leg becoming more swollen, red, itchy, hot, and occasionally painful. The redness started near her ankle and then started expanding up the leg. Started about 3 days ago. Hasn't had this happen before. Unsure of recent bug bites. Denies any wounds. No systemic symptoms such as f/c, n/v, sob, cp, abd pain, or other complaints. No h/o DVT or PE. No recent travel. Prior to Admission Medications   Prescriptions Last Dose Informant Patient Reported? Taking? ALPRAZolam (XANAX) 0.25 mg tablet  Self No No   Sig: Take 1 tablet (0.25 mg total) by mouth 2 (two) times a day as needed for anxiety   Diclofenac Sodium (VOLTAREN) 1 %   No No   Sig: Apply 2 g topically 4 (four) times a day   FLUAD 0.5 ML BERNABE   Yes No   Sig: inject 0.5 milliliter intramuscularly   Patient not taking: Reported on 6/28/2022   FLUoxetine (PROzac) 20 mg capsule   Yes No   Sig: Take 1 capsule by mouth every morning   atorvastatin (LIPITOR) 40 mg tablet   No No   Sig: Take 1 tablet (40 mg total) by mouth daily   ciclopirox (LOPROX) 0.77 % cream  Self Yes No   Sig: APPLY TO AFFECTED AREA UNDER THE BREAST AS NEEDED FOR FLARES.    Patient not taking: Reported on 7/12/2023   clobetasol (TEMOVATE) 0.05 % cream  Self No No   Sig: Apply 5 g (1 application total) topically 2 (two) times a day   Patient not taking: Reported on 7/12/2023   cyclopentolate (CYCLOGYL) 1 % ophthalmic solution  Self Yes No   Patient not taking: Reported on 9/27/2022   diltiazem (Dilt-XR) 240 MG 24 hr capsule  Self No No   Sig: Take 1 capsule (240 mg total) by mouth daily   docusate sodium (Colace) 100 mg capsule  Self No No   Sig: Take 2 capsules (200 mg total) by mouth 2 (two) times a day   famotidine (PEPCID) 40 MG tablet   No No   Sig: take 1 tablet by mouth once daily   hydrocortisone 2.5 % cream  Self Yes No   Sig: Apply 1 application topically 2 (two) times a day   Patient not taking: Reported on 7/12/2023   lisinopril-hydrochlorothiazide (PRINZIDE,ZESTORETIC) 20-12.5 MG per tablet   No No   Sig: take 1 tablet by mouth once daily   nystatin (MYCOSTATIN) cream   No No   Sig: Apply topically 2 (two) times a day   omeprazole (PriLOSEC) 40 MG capsule  Self No No   Sig: Take 1 capsule (40 mg total) by mouth daily   perphenazine 2 mg tablet   Yes No   trospium chloride (SANCTURA) 20 mg tablet  Self Yes No   Sig: Take 20 mg by mouth 2 (two) times a day      Facility-Administered Medications: None       Past Medical History:   Diagnosis Date    Arthritis     Chest pain     Edema of lower extremity     Esophageal reflux     Hx of cardiac cath     Hypertension        Past Surgical History:   Procedure Laterality Date    BLADDER SURGERY      COLONOSCOPY      EGD         Family History   Problem Relation Age of Onset    COPD Mother     Emphysema Mother     Heart disease Sister     No Known Problems Brother      I have reviewed and agree with the history as documented. E-Cigarette/Vaping    E-Cigarette Use Never User      E-Cigarette/Vaping Substances    Nicotine No     THC No     CBD No     Flavoring No     Other No     Unknown No      Social History     Tobacco Use    Smoking status: Never    Smokeless tobacco: Never   Vaping Use    Vaping Use: Never used   Substance Use Topics    Alcohol use: No    Drug use: Never        Review of Systems   All other systems reviewed and are negative.       Physical Exam  ED Triage Vitals [10/26/23 1559]   Temperature Pulse Respirations Blood Pressure SpO2   97.6 °F (36.4 °C) 78 20 169/79 98 %      Temp Source Heart Rate Source Patient Position - Orthostatic VS BP Location FiO2 (%)   Temporal Monitor Sitting Left arm --      Pain Score       4             Orthostatic Vital Signs  Vitals:    10/26/23 1559   BP: 169/79 Pulse: 78   Patient Position - Orthostatic VS: Sitting       Physical Exam  Vitals and nursing note reviewed. Constitutional:       General: She is not in acute distress. Appearance: She is well-developed. HENT:      Head: Normocephalic and atraumatic. Eyes:      Conjunctiva/sclera: Conjunctivae normal.   Cardiovascular:      Rate and Rhythm: Normal rate and regular rhythm. Heart sounds: No murmur heard. Pulmonary:      Effort: Pulmonary effort is normal. No respiratory distress. Breath sounds: Normal breath sounds. Abdominal:      Palpations: Abdomen is soft. Tenderness: There is no abdominal tenderness. Musculoskeletal:         General: No swelling. Cervical back: Neck supple. Right lower leg: Edema present. Left lower leg: Edema present. Comments: RLE hot to touch, erythematous from ankle to mid-shin. No calf tenderness. No open wounds on RLE. Skin:     General: Skin is warm and dry. Capillary Refill: Capillary refill takes less than 2 seconds. Neurological:      Mental Status: She is alert. Psychiatric:         Mood and Affect: Mood normal.         ED Medications  Medications   cephalexin (KEFLEX) capsule 500 mg (500 mg Oral Given 10/26/23 3983)       Diagnostic Studies  Results Reviewed       None                   No orders to display         Procedures  Procedures      ED Course                                       Medical Decision Making  66 F presenting to the ED with RLE findings concerning for cellulitis. Less likely venous stasis dermatitis as this is unilateral. Doubt DVT. Will treat with course of oral antibiotics. Patient otherwise appears well, has reassuring vital signs, and otherwise does not require further labs or imaging at this time. Will treat with first dose while here and discharge with recommendations to f/u w/ PCP in 1 week for reevaluation. Recommend return to ED with any sepsis symptoms.      Risk  Prescription drug management. Disposition  Final diagnoses:   Cellulitis   Peripheral edema     Time reflects when diagnosis was documented in both MDM as applicable and the Disposition within this note       Time User Action Codes Description Comment    10/26/2023  4:48 PM Cecilia Kerr Add [L03.90] Cellulitis     10/26/2023  4:49 PM Cecilia Kerr Add [R60.9] Peripheral edema           ED Disposition       ED Disposition   Discharge    Condition   Stable    Date/Time   Thu Oct 26, 2023  4:48 PM    Comment   Courtney Grahamlexa discharge to home/self care.                    Follow-up Information       Follow up With Specialties Details Why Contact Info    Bee Tian MD Internal Medicine, Geriatric Medicine   [address] [place] [address]  [phone]              Discharge Medication List as of 10/26/2023  5:14 PM        START taking these medications    Details   cephalexin (KEFLEX) 500 mg capsule Take 1 capsule (500 mg total) by mouth every 12 (twelve) hours for 7 days, Starting Thu 10/26/2023, Until Thu 11/2/2023, Normal           CONTINUE these medications which have NOT CHANGED    Details   ALPRAZolam (XANAX) 0.25 mg tablet Take 1 tablet (0.25 mg total) by mouth 2 (two) times a day as needed for anxiety, Starting Wed 6/29/2022, Normal      atorvastatin (LIPITOR) 40 mg tablet Take 1 tablet (40 mg total) by mouth daily, Starting Thu 3/24/2022, Until Wed 7/12/2023, Normal      ciclopirox (LOPROX) 0.77 % cream APPLY TO AFFECTED AREA UNDER THE BREAST AS NEEDED FOR FLARES., Historical Med      clobetasol (TEMOVATE) 0.05 % cream Apply 5 g (1 application total) topically 2 (two) times a day, Starting Tue 3/16/2021, Normal      cyclopentolate (CYCLOGYL) 1 % ophthalmic solution Starting Wed 10/20/2021, Historical Med      Diclofenac Sodium (VOLTAREN) 1 % Apply 2 g topically 4 (four) times a day, Starting Thu 3/24/2022, Until Tue 6/28/2022, Normal      diltiazem (Dilt-XR) 240 MG 24 hr capsule Take 1 capsule (240 mg total) by mouth daily, Starting Thu 3/24/2022, Normal      docusate sodium (Colace) 100 mg capsule Take 2 capsules (200 mg total) by mouth 2 (two) times a day, Starting Thu 3/24/2022, Until Wed 7/12/2023, Normal      famotidine (PEPCID) 40 MG tablet take 1 tablet by mouth once daily, Normal      FLUAD 0.5 ML BERNABE inject 0.5 milliliter intramuscularly, Historical Med      FLUoxetine (PROzac) 20 mg capsule Take 1 capsule by mouth every morning, Historical Med      hydrocortisone 2.5 % cream Apply 1 application topically 2 (two) times a day, Starting Fri 3/30/2018, Historical Med      lisinopril-hydrochlorothiazide (PRINZIDE,ZESTORETIC) 20-12.5 MG per tablet take 1 tablet by mouth once daily, Normal      nystatin (MYCOSTATIN) cream Apply topically 2 (two) times a day, Starting Wed 7/12/2023, Normal      omeprazole (PriLOSEC) 40 MG capsule Take 1 capsule (40 mg total) by mouth daily, Starting Thu 3/24/2022, Until Tue 9/27/2022, Normal      perphenazine 2 mg tablet Starting Tue 10/26/2021, Historical Med      trospium chloride (SANCTURA) 20 mg tablet Take 20 mg by mouth 2 (two) times a day, Historical Med           No discharge procedures on file. PDMP Review         Value Time User    PDMP Reviewed  Yes 6/28/2022  4:17 PM Alona Neville MD             ED Provider  Attending physically available and evaluated Chitra Hubbard. I managed the patient along with the ED Attending.     Electronically Signed by           Erin Jasso MD  10/26/23 8091

## 2023-10-27 ENCOUNTER — VBI (OUTPATIENT)
Dept: INTERNAL MEDICINE CLINIC | Facility: CLINIC | Age: 78
End: 2023-10-27

## 2023-10-27 NOTE — LETTER
[facility] [address]    Date: 10/31/23  Florencio Rasheedandra  [address] [place] [address] [place] [address]    Dear Karthik Swift: Thank you for choosing [facility] emergency department for care. Your primary care provider wants to make sure that your ongoing medical care is being addressed. If you require follow up care as a result of your emergency department visit, there are a few things the practice would like you to know. As part of the network's continuing commitment to caring for our patients, we have added more same day appointments and have extended office hours to meet your medical needs. After hours, on-call physicians are available via your primary care provider's main office line. We encourage you to contact our office prior to seeking treatment to discuss your symptoms with the medical staff. Together, we can determine the correct course of action. A majority of non-emergent conditions such as: common cold, flu-like symptoms, fevers, strains/sprains, dislocations, minor burns, cuts and animal bites can be treated at [facility] facilities. Diagnostic testing is available at some sites. Of course, if you are experiencing a life threatening medical emergency call 911 or proceed directly to the nearest emergency room.     Your nearest [facility] facility is conveniently located at:    [address], [address] [place] [address]  [phone]  [facility] [address]  offered at [address] your spot online at [email or web address] or on the [address] [place] [address] [address]    Sincerely,    [facility]  Dept: [phone]

## 2023-10-27 NOTE — TELEPHONE ENCOUNTER
10/27/23 2:21 PM    Patient contacted post ED visit, first outreach attempt made. Message was left for patient to return a call to the VBI Department at [facility]: Phone [phone]. Thank you.   Jenny Mcconnell MA  [facility]

## 2023-10-29 NOTE — ED ATTENDING ATTESTATION
10/26/2023  ISpencer DO, saw and evaluated the patient. I have discussed the patient with the resident/non-physician practitioner and agree with the resident's/non-physician practitioner's findings, Plan of Care, and MDM as documented in the resident's/non-physician practitioner's note, except where noted. All available labs and Radiology studies were reviewed. I was present for key portions of any procedure(s) performed by the resident/non-physician practitioner and I was immediately available to provide assistance. At this point I agree with the current assessment done in the Emergency Department. I have conducted an independent evaluation of this patient a history and physical is as follows:      69-year-old female with chronic venous stasis of the bilateral lower extremities presents with right lower extremity erythema and swelling. No systemic symptoms. No fever no dyspnea no other associated symptoms.   Likely cellulitis will treat with oral antibiotics doubt STI differential includes venous stasis changes asymmetric peripheral edema arthritis  ED Course         Critical Care Time  Procedures

## 2023-10-30 NOTE — TELEPHONE ENCOUNTER
10/30/23 3:21 PM    Patient contacted post ED visit, second outreach attempt made. Message was left for patient to return a call to the VBI Department at [facility]: Phone [phone]. Thank you.   Ernst Mathis MA  [facility]

## 2023-10-31 NOTE — TELEPHONE ENCOUNTER
10/31/23 3:10 PM    Patient contacted post ED visit, third outreach attempt made. Message was left for patient to return a call to either the VBI Department at [facility]: Phone [phone] or the PCP office. Thank you. Yana Pittman MA  [facility]    10/31/23 3:12 PM    Patient contacted post ED visit, phone outreaches were unsuccessful and a MyChart letter has been sent to the patient as follow-up. Thank you.   Yana Pittman MA  [facility]

## 2023-11-02 DIAGNOSIS — K21.9 GASTROESOPHAGEAL REFLUX DISEASE, UNSPECIFIED WHETHER ESOPHAGITIS PRESENT: ICD-10-CM

## 2023-11-02 RX ORDER — FAMOTIDINE 40 MG/1
TABLET, FILM COATED ORAL
Qty: 30 TABLET | Refills: 0 | Status: SHIPPED | OUTPATIENT
Start: 2023-11-02

## 2023-11-27 DIAGNOSIS — K21.9 GASTROESOPHAGEAL REFLUX DISEASE, UNSPECIFIED WHETHER ESOPHAGITIS PRESENT: ICD-10-CM

## 2023-11-27 RX ORDER — FAMOTIDINE 40 MG/1
TABLET, FILM COATED ORAL
Qty: 30 TABLET | Refills: 5 | Status: SHIPPED | OUTPATIENT
Start: 2023-11-27

## 2024-03-28 ENCOUNTER — TELEPHONE (OUTPATIENT)
Dept: INTERNAL MEDICINE CLINIC | Facility: CLINIC | Age: 79
End: 2024-03-28

## 2024-03-28 NOTE — TELEPHONE ENCOUNTER
Patient being seen at another PCP due to not having Pshy here in practice. Went to PCP that has everything was not provided with new doctor information other than [facility] [place].

## 2024-03-29 NOTE — TELEPHONE ENCOUNTER
03/29/24 2:07 PM     The office's request has been received, reviewed, and the patient chart updated. The PCP has successfully been removed with a patient attribution note. This message will now be completed.    Thank you  Janis Serra

## 2024-04-30 ENCOUNTER — TRANSCRIBE ORDERS (OUTPATIENT)
Dept: LAB | Facility: CLINIC | Age: 79
End: 2024-04-30

## 2024-04-30 ENCOUNTER — APPOINTMENT (OUTPATIENT)
Dept: LAB | Facility: CLINIC | Age: 79
End: 2024-04-30
Payer: COMMERCIAL

## 2024-04-30 DIAGNOSIS — Z79.899 ENCOUNTER FOR LONG-TERM (CURRENT) USE OF OTHER MEDICATIONS: ICD-10-CM

## 2024-04-30 DIAGNOSIS — I10 ESSENTIAL (PRIMARY) HYPERTENSION: ICD-10-CM

## 2024-04-30 DIAGNOSIS — I10 ESSENTIAL (PRIMARY) HYPERTENSION: Primary | ICD-10-CM

## 2024-04-30 LAB
BASOPHILS # BLD AUTO: 0.05 THOUSANDS/ÂΜL (ref 0–0.1)
BASOPHILS NFR BLD AUTO: 1 % (ref 0–1)
CHOLEST SERPL-MCNC: 132 MG/DL
EOSINOPHIL # BLD AUTO: 0.18 THOUSAND/ÂΜL (ref 0–0.61)
EOSINOPHIL NFR BLD AUTO: 2 % (ref 0–6)
ERYTHROCYTE [DISTWIDTH] IN BLOOD BY AUTOMATED COUNT: 13.4 % (ref 11.6–15.1)
FOLATE SERPL-MCNC: 21.2 NG/ML
HCT VFR BLD AUTO: 35.9 % (ref 34.8–46.1)
HDLC SERPL-MCNC: 59 MG/DL
HGB BLD-MCNC: 11.6 G/DL (ref 11.5–15.4)
IMM GRANULOCYTES # BLD AUTO: 0.03 THOUSAND/UL (ref 0–0.2)
IMM GRANULOCYTES NFR BLD AUTO: 0 % (ref 0–2)
LDLC SERPL CALC-MCNC: 56 MG/DL (ref 0–100)
LYMPHOCYTES # BLD AUTO: 2.81 THOUSANDS/ÂΜL (ref 0.6–4.47)
LYMPHOCYTES NFR BLD AUTO: 35 % (ref 14–44)
MCH RBC QN AUTO: 29.3 PG (ref 26.8–34.3)
MCHC RBC AUTO-ENTMCNC: 32.3 G/DL (ref 31.4–37.4)
MCV RBC AUTO: 91 FL (ref 82–98)
MONOCYTES # BLD AUTO: 0.65 THOUSAND/ÂΜL (ref 0.17–1.22)
MONOCYTES NFR BLD AUTO: 8 % (ref 4–12)
NEUTROPHILS # BLD AUTO: 4.23 THOUSANDS/ÂΜL (ref 1.85–7.62)
NEUTS SEG NFR BLD AUTO: 54 % (ref 43–75)
NONHDLC SERPL-MCNC: 73 MG/DL
NRBC BLD AUTO-RTO: 0 /100 WBCS
PLATELET # BLD AUTO: 275 THOUSANDS/UL (ref 149–390)
PMV BLD AUTO: 10 FL (ref 8.9–12.7)
RBC # BLD AUTO: 3.96 MILLION/UL (ref 3.81–5.12)
TRIGL SERPL-MCNC: 86 MG/DL
VIT B12 SERPL-MCNC: 331 PG/ML (ref 180–914)
WBC # BLD AUTO: 7.95 THOUSAND/UL (ref 4.31–10.16)

## 2024-04-30 PROCEDURE — 85025 COMPLETE CBC W/AUTO DIFF WBC: CPT

## 2024-04-30 PROCEDURE — 82746 ASSAY OF FOLIC ACID SERUM: CPT

## 2024-04-30 PROCEDURE — 80053 COMPREHEN METABOLIC PANEL: CPT

## 2024-04-30 PROCEDURE — 82607 VITAMIN B-12: CPT

## 2024-04-30 PROCEDURE — 80061 LIPID PANEL: CPT

## 2024-04-30 PROCEDURE — 36415 COLL VENOUS BLD VENIPUNCTURE: CPT

## 2024-05-08 LAB
ALBUMIN SERPL BCP-MCNC: 4 G/DL (ref 3.5–5)
ALP SERPL-CCNC: 72 U/L (ref 34–104)
ALT SERPL W P-5'-P-CCNC: 10 U/L (ref 7–52)
ANION GAP SERPL CALCULATED.3IONS-SCNC: 4 MMOL/L (ref 4–13)
AST SERPL W P-5'-P-CCNC: 16 U/L (ref 13–39)
BILIRUB SERPL-MCNC: 0.67 MG/DL (ref 0.2–1)
BUN SERPL-MCNC: 17 MG/DL (ref 5–25)
CALCIUM SERPL-MCNC: 9 MG/DL (ref 8.4–10.2)
CHLORIDE SERPL-SCNC: 106 MMOL/L (ref 96–108)
CO2 SERPL-SCNC: 30 MMOL/L (ref 21–32)
CREAT SERPL-MCNC: 0.96 MG/DL (ref 0.6–1.3)
GFR SERPL CREATININE-BSD FRML MDRD: 56 ML/MIN/1.73SQ M
GLUCOSE P FAST SERPL-MCNC: 87 MG/DL (ref 65–99)
POTASSIUM SERPL-SCNC: 3.7 MMOL/L (ref 3.5–5.3)
PROT SERPL-MCNC: 6.7 G/DL (ref 6.4–8.4)
SODIUM SERPL-SCNC: 140 MMOL/L (ref 135–147)

## 2024-05-17 DIAGNOSIS — K21.9 GASTROESOPHAGEAL REFLUX DISEASE, UNSPECIFIED WHETHER ESOPHAGITIS PRESENT: ICD-10-CM

## 2024-05-17 RX ORDER — FAMOTIDINE 40 MG/1
TABLET, FILM COATED ORAL
Qty: 30 TABLET | Refills: 5 | Status: SHIPPED | OUTPATIENT
Start: 2024-05-17

## 2024-11-14 ENCOUNTER — HOSPITAL ENCOUNTER (OUTPATIENT)
Dept: RADIOLOGY | Facility: HOSPITAL | Age: 79
Discharge: HOME | End: 2024-11-14
Attending: STUDENT IN AN ORGANIZED HEALTH CARE EDUCATION/TRAINING PROGRAM
Payer: COMMERCIAL

## 2024-11-14 DIAGNOSIS — R13.12 OROPHARYNGEAL DYSPHAGIA: ICD-10-CM

## 2024-11-14 PROCEDURE — 92611 MOTION FLUOROSCOPY/SWALLOW: CPT

## 2024-11-14 PROCEDURE — 74230 X-RAY XM SWLNG FUNCJ C+: CPT

## 2024-11-14 NOTE — PROCEDURES
Video Swallow Study      Patient Name: Paige Dumas  Today's Date: 11/14/2024         Assessment Summary: Mild oral dysphagia characterized by prolonged oral manipulation w/ delayed bolus transfer. Pharyngeal  swallow appeared WNL w/ no pharyngeal retention, laryngeal penetration or aspiration observed.            Recommendations:   Regular diet w/ thin liquids  Meds as tolerated           General Information:   80 yo female referred to [facility]  for a VBS by Dr. Lincoln for dysphagia w/  c/o frequent choking with both solids and liquids,can be any foods.  Pt denied difficulty swallowing pills w/ water .    Cognition:  WNL    Speech/Swallow Mech:   Oral motor movements appeared  WNL;   Dentition was  natural;    Respiratory Status: RA;     Current diet: regular diet w/ thin liquids.    Prior VBS none     Pt was seen in radiology for a Video Barium Swallow Study, pt stood and was viewed laterally and AP with the following consistencies: puree, solids, nectar thick liquids, thin liquids and barium pill w/ water by cup.       Results are as follows:     **Images are available for review on PACS        Oral Stage: pt was able to bite cookie and exhibited effective but prolonged and somewhat disorganized manipulation with delayed bolus transfer. Mild premature spill noted with initial straw sip of thin liquids. Pt chewed pill. Bolus control of liquids appeared complete.         Lip Closure: complete  Tongue Control During Bolus Hold: complete  Bolus Preparation/Mastication: effective mastication, prolonged bolus prep   Bolus Transport/Lingual Motion: delayed  Oral Residue: absent  Velopharyngeal Closure: complete       Pharyngeal Stage: swallow initiation was prompt  with complete hyolaryngeal excursion, tongue base retraction, epiglottic inversion and airway closure. No pharyngeal retention, laryngeal penetration or aspiration observed.            Initiation of the  Pharyngeal Swallow: prompt  Laryngeal Elevation: complete  Anterior Hyoid Excursion:  complete  Epiglottic Movement/Inversion: complete inversion  Laryngeal Vestibular Closure: complete  Pharyngeal Stripping Wave: complete  Pharyngeal Contraction (from AP view): complete  Pharyngoesophageal segment Opening: complete distension  Base of Tongue Retraction: complete   Pharyngeal residue: absent          Penetration/Aspiration:  Thin: 1  Nectar: 1  Honey: NA  Puree: 1  Solid: 1  Response to Aspiration: no aspiration   Strategies/Efficacy:  NA    8-Point Penetration-Aspiration Scale   1 Material does not enter the airway   2 Material enters the airway, remains above the vocal folds, and is ejected  from the  airway    3 Material enters the airway, remains above the vocal folds, and is not ejected from the airway   4 Material enters the airway, contacts the vocal folds, and is ejected from the airway   5 Material enters the airway, contacts the vocal folds, and is not ejected from the airway    6 Material enters the airway, passes below the vocal folds and is ejected into the larynx or out of the airway    7 Material enters the airway, passes below the vocal folds, and is not ejected from the trachea despite effort    8 Material enters the airway, passes below the vocal folds, and no effort is made to eject              Esophageal Stage:  briefly assessed, no overt abnormality noted, all materials cleared the esophagus in a timely manner.       Nesha Wallace MA CCC-SLP  Speech Pathologist  [place] license # [id number]  [place] license # [id number]  Available via Secure Chat

## 2024-11-21 ENCOUNTER — APPOINTMENT (OUTPATIENT)
Dept: RADIOLOGY | Facility: AMBULARY SURGERY CENTER | Age: 79
End: 2024-11-21
Attending: STUDENT IN AN ORGANIZED HEALTH CARE EDUCATION/TRAINING PROGRAM
Payer: COMMERCIAL

## 2024-11-21 ENCOUNTER — OFFICE VISIT (OUTPATIENT)
Dept: OBGYN CLINIC | Facility: CLINIC | Age: 79
End: 2024-11-21
Payer: COMMERCIAL

## 2024-11-21 VITALS — BODY MASS INDEX: 25.47 KG/M2 | WEIGHT: 149.2 LBS | HEIGHT: 64 IN

## 2024-11-21 DIAGNOSIS — M17.12 PRIMARY OSTEOARTHRITIS OF LEFT KNEE: Primary | ICD-10-CM

## 2024-11-21 DIAGNOSIS — M22.2X2 PATELLOFEMORAL DISORDER OF LEFT KNEE: ICD-10-CM

## 2024-11-21 DIAGNOSIS — M25.562 LEFT KNEE PAIN, UNSPECIFIED CHRONICITY: ICD-10-CM

## 2024-11-21 PROCEDURE — 20610 DRAIN/INJ JOINT/BURSA W/O US: CPT | Performed by: STUDENT IN AN ORGANIZED HEALTH CARE EDUCATION/TRAINING PROGRAM

## 2024-11-21 PROCEDURE — 99214 OFFICE O/P EST MOD 30 MIN: CPT | Performed by: STUDENT IN AN ORGANIZED HEALTH CARE EDUCATION/TRAINING PROGRAM

## 2024-11-21 PROCEDURE — 73562 X-RAY EXAM OF KNEE 3: CPT

## 2024-11-21 RX ORDER — METHYLPREDNISOLONE ACETATE 40 MG/ML
1 INJECTION, SUSPENSION INTRA-ARTICULAR; INTRALESIONAL; INTRAMUSCULAR; SOFT TISSUE
Status: COMPLETED | OUTPATIENT
Start: 2024-11-21 | End: 2024-11-21

## 2024-11-21 RX ADMIN — METHYLPREDNISOLONE ACETATE 1 ML: 40 INJECTION, SUSPENSION INTRA-ARTICULAR; INTRALESIONAL; INTRAMUSCULAR; SOFT TISSUE at 13:30

## 2024-11-21 NOTE — PROGRESS NOTES
Orthopaedics Office Visit -new placed on patient Visit    ASSESSMENT/PLAN:    Assessment:   #1 left knee osteoarthritis  #2 left knee patellofemoral syndrome    Plan:   X-rays reviewed show mild left knee osteoarthritis no fractures or dislocations no osseous lesions  Discussion was had with the patient about nonoperative versus operative management for knee osteoarthritis.  We discussed nonoperative management in the forms of anti-inflammatory medications, activity modifications, corticosteroid, viscosupplementation.  Also discussed that if these methods do not successful the surgical option would be a total knee arthroplasty.  The patient would like to continue with conservative management options at this time   Patient would like to proceed with corticosteroid injection of the left knee and would like to come back for a right corticosteroid injection into her right knee which she has previously received  Weightbearing to tolerance  PT prescription provided  Left knee steroid injection provided at patient's request today    To Do Next Visit:  Follow-up in 4 to 6 weeks for recheck and possible evaluation of the right knee osteoarthritis which has benefited from steroid injections in the past.  Patient declined injection to right knee today.    _____________________________________________________  CHIEF COMPLAINT:  Chief Complaint   Patient presents with    Left Knee - Pain         SUBJECTIVE:  Paige Dumas is a 79 y.o. female who presents 2 months of left knee pain worse with activity primarily anterior and lateral knee.  Better with rest better with Tylenol.  Stable mild pain occurs daily not worsening over time.  She has a history of right knee osteoarthritis that responded well to steroid injections last right knee injection was about 5 years ago with Dr. Layton.  She denies fevers chills numbness tingling.  Occasionally has mild left knee swelling which resolves with rest.  No radiation of pain    PAST  MEDICAL HISTORY:  Past Medical History:   Diagnosis Date    Arthritis     Chest pain     Edema of lower extremity     Esophageal reflux     Hx of cardiac cath     Hypertension        PAST SURGICAL HISTORY:  Past Surgical History:   Procedure Laterality Date    BLADDER SURGERY      COLONOSCOPY      EGD         FAMILY HISTORY:  Family History   Problem Relation Age of Onset    COPD Mother     Emphysema Mother     Heart disease Sister     No Known Problems Brother        SOCIAL HISTORY:  Social History     Tobacco Use    Smoking status: Never    Smokeless tobacco: Never   Vaping Use    Vaping status: Never Used   Substance Use Topics    Alcohol use: No    Drug use: Never       MEDICATIONS:    Current Outpatient Medications:     ALPRAZolam (XANAX) 0.25 mg tablet, Take 1 tablet (0.25 mg total) by mouth 2 (two) times a day as needed for anxiety, Disp: 3 tablet, Rfl: 0    diltiazem (Dilt-XR) 240 MG 24 hr capsule, Take 1 capsule (240 mg total) by mouth daily, Disp: 90 capsule, Rfl: 5    famotidine (PEPCID) 40 MG tablet, take 1 tablet by mouth once daily, Disp: 30 tablet, Rfl: 5    FLUoxetine (PROzac) 20 mg capsule, Take 1 capsule by mouth every morning, Disp: , Rfl:     lisinopril-hydrochlorothiazide (PRINZIDE,ZESTORETIC) 20-12.5 MG per tablet, take 1 tablet by mouth once daily, Disp: 90 tablet, Rfl: 5    nystatin (MYCOSTATIN) cream, Apply topically 2 (two) times a day, Disp: 30 g, Rfl: 0    perphenazine 2 mg tablet, , Disp: , Rfl:     trospium chloride (SANCTURA) 20 mg tablet, Take 20 mg by mouth 2 (two) times a day, Disp: , Rfl:     atorvastatin (LIPITOR) 40 mg tablet, Take 1 tablet (40 mg total) by mouth daily, Disp: 90 tablet, Rfl: 5    ciclopirox (LOPROX) 0.77 % cream, APPLY TO AFFECTED AREA UNDER THE BREAST AS NEEDED FOR FLARES. (Patient not taking: Reported on 7/12/2023), Disp: , Rfl:     clobetasol (TEMOVATE) 0.05 % cream, Apply 5 g (1 application total) topically 2 (two) times a day (Patient not taking: Reported on  "7/12/2023), Disp: 30 g, Rfl: 0    cyclopentolate (CYCLOGYL) 1 % ophthalmic solution, , Disp: , Rfl:     Diclofenac Sodium (VOLTAREN) 1 %, Apply 2 g topically 4 (four) times a day, Disp: 240 g, Rfl: 0    docusate sodium (Colace) 100 mg capsule, Take 2 capsules (200 mg total) by mouth 2 (two) times a day, Disp: 120 capsule, Rfl: 5    FLUAD 0.5 ML BERNABE, inject 0.5 milliliter intramuscularly (Patient not taking: Reported on 11/21/2024), Disp: , Rfl: 0    hydrocortisone 2.5 % cream, Apply 1 application topically 2 (two) times a day (Patient not taking: Reported on 7/12/2023), Disp: , Rfl: 0    omeprazole (PriLOSEC) 40 MG capsule, Take 1 capsule (40 mg total) by mouth daily, Disp: 90 capsule, Rfl: 5    ALLERGIES:  No Known Allergies    REVIEW OF SYSTEMS:  MSK: None  Neuro: None  Pertinent items are otherwise noted in HPI.  A comprehensive review of systems was otherwise negative.    LABS:  HgA1c:   Lab Results   Component Value Date    HGBA1C 5.4 12/28/2021     BMP:   Lab Results   Component Value Date    GLUCOSE 85 07/06/2015    CALCIUM 9.0 04/30/2024     07/06/2015    K 3.7 04/30/2024    CO2 30 04/30/2024     04/30/2024    BUN 17 04/30/2024    CREATININE 0.96 04/30/2024     CBC: No components found for: \"CBC\"    _____________________________________________________  PHYSICAL EXAMINATION:  Vital signs: Ht 5' 4\" (1.626 m)   Wt 67.7 kg (149 lb 3.2 oz)   BMI 25.61 kg/m²   General: No acute distress, awake and alert  Psychiatric: Mood and affect appear appropriate  HEENT: Trachea Midline, No torticollis, no apparent facial trauma  Cardiovascular: No audible murmurs; Extremities appear perfused  Pulmonary: No audible wheezing or stridor  Skin: No open lesions; see further details (if any) below    MUSCULOSKELETAL EXAMINATION:  Extremities:    Left lower extremity  No effusion no skin lesions no erythema or induration  Mild tenderness palpation over patella and lateral joint line  Mild tenderness to palpation " over the pes tendons  Stable varus valgus Lachman and posterior drawer exam left knee  No pain with flexion extension cycling no crepitus palpable  Positive patellar compression test  Knee extension to 0 full extension to 120 degrees  Sensation intact to light touch left foot  Motor intact ankle plantarflexion dorsiflexion knee flexion extension  Foot warm perfused      _____________________________________________________  STUDIES REVIEWED:  I personally reviewed the images and interpretation is as follows:  X-ray left knee showing mild medial compartment osteoarthritis no other significant degenerative changes no fracture or dislocation.    PROCEDURES PERFORMED:  Large joint arthrocentesis: L knee  Universal Protocol:  Consent given by: patient  Procedure Details  Location: knee - L knee  Needle size: 22 G  Approach: anterolateral  Medications administered: 1 mL methylPREDNISolone acetate 40 mg/mL    Patient tolerance: patient tolerated the procedure well with no immediate complications  Dressing:  Sterile dressing applied      Agustin Rivera MD

## 2024-11-25 DIAGNOSIS — K21.9 GASTROESOPHAGEAL REFLUX DISEASE, UNSPECIFIED WHETHER ESOPHAGITIS PRESENT: ICD-10-CM

## 2024-11-26 ENCOUNTER — EVALUATION (OUTPATIENT)
Dept: PHYSICAL THERAPY | Age: 79
End: 2024-11-26
Payer: COMMERCIAL

## 2024-11-26 DIAGNOSIS — M25.562 LEFT KNEE PAIN, UNSPECIFIED CHRONICITY: Primary | ICD-10-CM

## 2024-11-26 PROCEDURE — 97161 PT EVAL LOW COMPLEX 20 MIN: CPT

## 2024-11-26 RX ORDER — FAMOTIDINE 40 MG/1
40 TABLET, FILM COATED ORAL DAILY
Qty: 30 TABLET | Refills: 5 | OUTPATIENT
Start: 2024-11-26

## 2024-11-26 NOTE — PROGRESS NOTES
PT Evaluation     Today's date: 2024  Patient name: Paige Dumas  : 1945  MRN: [id number]  Referring provider: Warner Brewster DO  Dx:   Encounter Diagnosis     ICD-10-CM    1. Left knee pain, unspecified chronicity  M25.562 Ambulatory Referral to Physical Therapy                     Assessment  Impairments: abnormal gait, abnormal or restricted ROM, impaired balance, impaired physical strength, lacks appropriate home exercise program, pain with function, weight-bearing intolerance and poor posture   Symptom irritability: low    Assessment details: Pt is a 79 y.o. female who resents to OP PT with chief c/o b/l knee pain (L worse than R recently). Signs and symptoms indicate probable diagnosis of b/l knee OA. she has primary impairments of decreased knee flexion ROM, decreased b/l LE strength, decreased gait speed, decreased hip/knee flexibility, and increased pain. She is limited functionally as she has difficulty with prolonged ambulation, prolonged standing, prolonged sitting, transitional movements, household chores, ascending/descending stairs, and participating in usual recreational activities. Had pt perform AAROM, AROM, and static stretching for b/l knees which she tolerated well. Provided pt with HEP handout of these exercises and educated pt on proper completion of HEP, normal response to exercises, diagnosis, and POC. she verbalizes understanding of all education provided. All questions answered. Pt would benefit from skilled OP PT in order to improve upon impairments and return to PLOF.  Understanding of Dx/Px/POC: good     Prognosis: good    Goals  Short term goals (4-6 weeks)  Pt will improve strength by 1/2 grade in LE to improve tolerance to ADL's  Pt will improve ROM by 5 degrees in b/l knee to improve completion of ADL's  Pt will be independent with phase 1 HEP  Pt will report 50% functional improvement     Long term goals (6-8 weeks)  Pt will be independent with HEP by d/c  Pt  "will return to PLOF, perform normal leisure activities, and perform normal work duties with a 90% reduction in symptoms  Pt will be able to tolerate ambulating for 1 hour without pain  Pt will be able to standing for 1 hour without pain   Pt will be able to ascend/descend full flight of stairs without pain  Pt will improve FOTO >/= expected           Plan  Patient would benefit from: skilled physical therapy  Referral necessary: No  Other planned modality interventions: Any PRN    Planned therapy interventions: patient education, therapeutic exercise, graded exercise, functional ROM exercises, flexibility, home exercise program, manual therapy, activity modification, strengthening, stretching, joint mobilization, massage, therapeutic activities, balance/weight bearing training, aquatic therapy, neuromuscular re-education and gait training    Treatment plan discussed with: patient  Plan details: 1-2 visits then transition to HEP         Subjective Evaluation    History of Present Illness  Mechanism of injury: Chronic b/l knee pain. L knee injection a week ago provided 60% improvement. Pt reports knee pain for about 2-5 years now. R knee pain started prior to L knee pain. Agg factors: prolonged standing, going up stairs, going from sitting to standing, housekeeping. Denies night pain. She sometimes has pain at the end of the day before she goes to bed. Denies n/t or burning. Denies hx of injuries or surgeries to b/l knees. Although she did miss a step and fell on her R knee which started her R knee pain. Sometimes she feels some clicking/popping or \"creaking\" of the knee. Sometimes she will feel like the knees will give out on her. Pt reports that she gets pain on the front of the knee (medial and lateral joint lines). When it is cold out or raining her pain is worse. During the day her pain is at its worst.           Recurrent probem    Pain  Current pain ratin  At best pain ratin  At worst pain rating: " 10  Quality: sharp, dull ache and needle-like  Relieving factors: medications  Aggravating factors: stair climbing and standing  Progression: improved        Objective     Active Range of Motion     Lumbar   Flexion:  Restriction level: moderate  Extension:  Restriction level: maximal  Left lateral flexion:  Restriction level: maximal  Right lateral flexion:  Restriction level: maximal  Left rotation:  Restriction level: moderate  Right rotation:  Restriction level: moderate  Left Knee   Flexion: 118 degrees   Extension: 0 degrees     Right Knee   Flexion: 118 degrees   Extension: 0 degrees     Mobility   Patellar Mobility:   Left Knee   Hypomobile: left medial, left superior and left inferior    Right Knee   Hypomobile: medial, lateral and inferior     Strength/Myotome Testing     Left Hip   Planes of Motion   Flexion: 4-  Extension: 3+  Abduction: 3+    Right Hip   Planes of Motion   Flexion: 4-  Extension: 3+  Abduction: 3+    Left Knee   Flexion: 3+  Extension: 4-    Right Knee   Flexion: 3+  Extension: 4-    Left Ankle/Foot   Dorsiflexion: WFL.   Plantar flexion: WFL.     Right Ankle/Foot   Dorsiflexion: WFL.   Plantar flexion: WFL.     Tests     Lumbar     Left   Negative passive SLR and slump test.     Right   Negative passive SLR and slump test.     Left Hip   Positive TYLOR.     Right Hip   Positive TYLOR.     Left Knee   Positive patellar compression.   Negative anterior drawer, lateral Ezio, medial Ezio, valgus stress test at 0 degrees and varus stress test at 0 degrees.     Right Knee   Negative anterior drawer, lateral Ezio, medial Ezio, patellar compression, valgus stress test at 0 degrees and varus stress test at 0 degrees.   Neuro Exam:     Functional outcomes   5x sit to stand: 19 (seconds)  TU (seconds)             Precautions: B/l knee OA, LBP      Manuals             Knee PROM             TKE and knee flexion grade IV mob                                       Neuro Re-Ed                                                                                                         Ther Ex             Bike or nu step for ROM             H/S stretch HEP            HS HEP            SAQ             SLR flex w/ QS             LAQ HEP            Standing hip SLR's             Standing ham curl             HR/TR             Step ups             STS                                       Ther Activity                                       Gait Training                                       Modalities

## 2024-11-27 ENCOUNTER — TELEPHONE (OUTPATIENT)
Dept: GASTROENTEROLOGY | Facility: CLINIC | Age: 79
End: 2024-11-27

## 2024-12-02 ENCOUNTER — OFFICE VISIT (OUTPATIENT)
Dept: PHYSICAL THERAPY | Age: 79
End: 2024-12-02
Payer: COMMERCIAL

## 2024-12-02 DIAGNOSIS — M25.562 LEFT KNEE PAIN, UNSPECIFIED CHRONICITY: Primary | ICD-10-CM

## 2024-12-02 PROCEDURE — 97110 THERAPEUTIC EXERCISES: CPT

## 2024-12-02 NOTE — PROGRESS NOTES
"Daily Note     Today's date: 2024  Patient name: Paige Dumas  : 1945  MRN: [id number]  Referring provider: Warner Brewster DO  Dx: No diagnosis found.               Subjective: Pt offers no new complaints.       Objective: See treatment diary below      Assessment: Incorporated standing hip AROM, knee AROM, and closed chained LE strengthening which pt tolerated well. She requires cues for TE to maintain proper form. Fatigued during straight leg raise flexion on LLE more-so than RLE. She would benefit from continued OP PT in order to progress strength and ROM.  Tolerated treatment well. Patient demonstrated fatigue post treatment, exhibited good technique with therapeutic exercises, and would benefit from continued PT      Plan: Continue per plan of care.  Progress treatment as tolerated.       Precautions: B/l knee OA, LBP      Manuals            Knee PROM             TKE and knee flexion grade IV mob                                       Neuro Re-Ed                                                                                                        Ther Ex             Bike or nu step for ROM  10' L1 nu step           H/S stretch HEP 30\"x3 BLE           HS HEP X20 BLE           SAQ  X20 BLE           SLR flex w/ QS  X20 BLE           LAQ HEP X30            Standing hip SLR's  X20 ea           Standing ham curl  X20 ea           HR/TR  X20 ea           Step ups  X15 ea 6\"           STS  Mini squats x20                                      Ther Activity                                       Gait Training                                       Modalities                                            "

## 2024-12-04 ENCOUNTER — OFFICE VISIT (OUTPATIENT)
Dept: PHYSICAL THERAPY | Age: 79
End: 2024-12-04
Payer: COMMERCIAL

## 2024-12-04 DIAGNOSIS — M25.562 LEFT KNEE PAIN, UNSPECIFIED CHRONICITY: Primary | ICD-10-CM

## 2024-12-04 PROCEDURE — 97110 THERAPEUTIC EXERCISES: CPT

## 2024-12-04 NOTE — PROGRESS NOTES
"Daily Note     Today's date: 2024  Patient name: Paige Dumas  : 1945  MRN: [id number]  Referring provider: Warner Brewster DO  Dx:   Encounter Diagnosis     ICD-10-CM    1. Left knee pain, unspecified chronicity  M25.562                      Subjective: Pt offers no new complaints      Objective: See treatment diary below      Assessment: Pt with improved tolerance to TE today. Able to complete additional repetitions of supine SLR on LLE. She should continue HEP for knee ROM and LE strengthening. Tolerated treatment well. Patient demonstrated fatigue post treatment, exhibited good technique with therapeutic exercises, and would benefit from continued PT      Plan: Continue per plan of care.  Progress treatment as tolerated.       Precautions: B/l knee OA, LBP      Manuals           Knee PROM             TKE and knee flexion grade IV mob                                       Neuro Re-Ed                                                                                                        Ther Ex             Bike or nu step for ROM  10' L1 nu step 10' L1 nu step          H/S stretch HEP 30\"x3 BLE 30\"x3 BLE           HS HEP X20 BLE X20 BLE          SAQ  X20 BLE X30 BLE          SLR flex w/ QS  X20 BLE X20 BLE          LAQ HEP X30  X30           Standing hip SLR's  X20 ea X20 ea          Standing ham curl  X20 ea X20 ea          HR/TR  X20 ea X20 ea          Step ups  X15 ea 6\" X20 6\"           STS  Mini squats x20  Mini squats x20                                     Ther Activity                                       Gait Training                                       Modalities                                              "

## 2025-03-04 ENCOUNTER — OFFICE VISIT (OUTPATIENT)
Dept: OBGYN CLINIC | Facility: CLINIC | Age: 80
End: 2025-03-04
Payer: COMMERCIAL

## 2025-03-04 VITALS — HEIGHT: 64 IN | BODY MASS INDEX: 25.44 KG/M2 | WEIGHT: 149 LBS

## 2025-03-04 DIAGNOSIS — M17.11 PRIMARY OSTEOARTHRITIS OF RIGHT KNEE: ICD-10-CM

## 2025-03-04 DIAGNOSIS — M17.12 PRIMARY OSTEOARTHRITIS OF LEFT KNEE: Primary | ICD-10-CM

## 2025-03-04 PROCEDURE — 99213 OFFICE O/P EST LOW 20 MIN: CPT | Performed by: STUDENT IN AN ORGANIZED HEALTH CARE EDUCATION/TRAINING PROGRAM

## 2025-03-04 RX ORDER — DILTIAZEM HYDROCHLORIDE 240 MG/1
1 CAPSULE, COATED, EXTENDED RELEASE ORAL DAILY
COMMUNITY
Start: 2025-01-29

## 2025-03-04 NOTE — ASSESSMENT & PLAN NOTE
Patient is a Elderly (Approx >64yo) female with functionally limiting knee pain with short distances, functional instability, mechanical symptoms and identified modifiable risk factors.  Radiographic evaluation demonstrates minimal to mild degenerative changes in the medial compartment. Physical exam reveals neutral alignment and full range of motion. She has failed CSI injections and physical therapy. Her knee feels unstable and is a fall risk. Given these findings, I recommend:    - MRI to further evaluate the joint status and degree of degenerative changes vs soft tissue injury  - WBAT  - Activity modification to limit strain or impact on the joint as needed  - NSAIDs as needed  - Tylenol 1000mg up to three times daily as needed. Do not exceed 3000mg daily  - Continue PT/HEP as directed  - Knee sleeve or brace for comfort as needed  - Cane or walker recommended to offload joint as needed  - Patient may follow up for MRI review for further evaluation and treatment

## 2025-03-04 NOTE — PROGRESS NOTES
Date: 25  Paige Dumas   MRN# [id number]  : 1945      Chief Complaint: Left Knee Pain    Assessment and Plan:  The patient verbalized understanding of exam findings and treatment plan. We engaged in the shared decision-making process and treatment options were discussed at length with the patient. Surgical and conservative management discussed today along with risks and benefits. Patient was agreeable with the plan and all questions were answered to satisfaction.     Primary osteoarthritis of left knee  Patient is a Elderly (Approx >64yo) female with functionally limiting knee pain with short distances, functional instability, mechanical symptoms and identified modifiable risk factors.  Radiographic evaluation demonstrates minimal to mild degenerative changes in the medial compartment. Physical exam reveals neutral alignment and full range of motion. She has failed CSI injections and physical therapy. Her knee feels unstable and is a fall risk. Given these findings, I recommend:    - MRI to further evaluate the joint status and degree of degenerative changes vs soft tissue injury  - WBAT  - Activity modification to limit strain or impact on the joint as needed  - NSAIDs as needed  - Tylenol 1000mg up to three times daily as needed. Do not exceed 3000mg daily  - Continue PT/HEP as directed  - Knee sleeve or brace for comfort as needed  - Cane or walker recommended to offload joint as needed  - Patient may follow up for MRI review for further evaluation and treatment              Subjective:     Knee Pain  Patient presents to the clinic today, self referred, , with complaints of left knee pain. Patient states she is having continued knee pain despite recent CSI injection and PT. This is evaluated as a personal injury. The pain began a few months ago. The pain is located medial, lateral and rates their pain as 7/10. She describes the symptoms as sharp. Symptoms improve with rest. The symptoms  "are worse with activity. The knee has given out or felt unstable. The patient can bend and straighten the knee fully. Treatment to date has been ice, Tylenol, NSAID's, knee sleeve/brace, cortisone injection, therapy, without significant relief.     Prior treatment:  NSAIDs Yes    Bracing Yes   Physical Therapy Yes   Cortisone Injections Yes   Viscosupplementation No       External Records Reviewed:   lab reports and office notes    Orthopedic [facility]  6/16/20 - Right knee treated by Dr. Layton  11/21/24 - Left knee treated by Dr. Brewster    Orthopedic Surgical History  None    Estimated body mass index is 25.58 kg/m² as calculated from the following:    Height as of this encounter: 5' 4\" (1.626 m).    Weight as of this encounter: 67.6 kg (149 lb).    Lab Results   Component Value Date    HGBA1C 5.4 12/28/2021    HGBA1C 5.5 04/17/2018   .   Lab Results   Component Value Date    EGFR 56 04/30/2024    EGFR 57 08/29/2023    EGFR 56 06/06/2023    CREATININE 0.96 04/30/2024    CREATININE 0.95 08/29/2023    CREATININE 0.97 06/06/2023        Allergy:  No Known Allergies  Medications:  All current active meds have been reviewed   Past Medical History:  Past Medical History:   Diagnosis Date    Arthritis     Chest pain     Edema of lower extremity     Esophageal reflux     Hx of cardiac cath     Hypertension      Past Surgical History:  Past Surgical History:   Procedure Laterality Date    BLADDER SURGERY      COLONOSCOPY      EGD       Family History:  Family History   Problem Relation Age of Onset    COPD Mother     Emphysema Mother     Heart disease Sister     No Known Problems Brother      Social History:  Social History     Substance and Sexual Activity   Alcohol Use No     Social History     Substance and Sexual Activity   Drug Use Never     Social History     Tobacco Use   Smoking Status Never   Smokeless Tobacco Never           Review of Systems:  General- denies fever/chills  HEENT- denies hearing loss or sore " "throat  Eyes- denies eye pain or visual disturbances, denies red eyes  Respiratory- denies cough or SOB  Cardio- denies chest pain or palpitations  GI- denies abdominal pain  Endocrine- denies urinary frequency  Urinary- denies pain with urination  Musculoskeletal- Negative except noted above  Skin- denies rashes or wounds  Neurological- denies dizziness or headache  Psychiatric- denies anxiety or difficulty concentrating      Objective:   BP Readings from Last 1 Encounters:   10/26/23 169/79      Wt Readings from Last 1 Encounters:   03/04/25 67.6 kg (149 lb)      Pulse Readings from Last 1 Encounters:   10/26/23 78        BMI: Estimated body mass index is 25.58 kg/m² as calculated from the following:    Height as of this encounter: 5' 4\" (1.626 m).    Weight as of this encounter: 67.6 kg (149 lb).      Physical Exam  Ht 5' 4\" (1.626 m)   Wt 67.6 kg (149 lb)   BMI 25.58 kg/m²   General/Constitutional: No apparent distress: well-nourished and well developed.  Eyes: normal ocular motion  Cardio: RRR, Normal S1S2, No m/r/g.   Lymphatic: No appreciable lymphadenopathy  Respiratory: Non-labored breathing, CTA b/l no w/c/r  Vascular: No edema, swelling or tenderness, except as noted in detailed exam. Extremities well perfused. No LE edema  Integumentary: No impressive skin lesions present, except as noted in detailed exam.  Neuro: No ataxia or tremors noted  Psych: Normal mood and affect, oriented to person, place and time. Appropriate affect.  Musculoskeletal: Normal, except as noted in detailed exam and in HPI.    Gait and Station:   normal and antalgic    Left Knee Exam:      Inspection:  normal color, temperature, turgor and moisture    Overall limb alignment: neutral    Effusion: no    ROM <0 to 135 with pain    Extensor Lag: Absent    Palpation: Medial and Lateral joint line tenderness to palpation  Positive Ezio's  Positive Thessaly's    stable to AP translation at 90 deg    Coronal plane stable in full " extension    Coronal plane stable in mid-flexion     Motor: 5/5 EHL/FHL/TA/GS/Qd/Hs    Vascular: Toes WWP with BCR    Sensory: SILT DP/SP/Kat/Saph/Ti      Images:  I personally reviewed relevant images in the PACS system and my interpretation is as follows:    X-rays of the left knee: minimal joint space narrowing. No fracture or dislocation.     Xrays of the right knee were reviewed which shows medial compartment moderate OA with sclerosis and joint space narrowing      Scribe Attestation      I,:  Ryne Phillips PA-C am acting as a scribe while in the presence of the attending physician.:       I,:  Dave Bhatt MD personally performed the services described in this documentation    as scribed in my presence.:             Dave Bhatt MD  Adult Reconstruction Specialist   [facility]

## 2025-03-19 ENCOUNTER — HOSPITAL ENCOUNTER (OUTPATIENT)
Dept: RADIOLOGY | Age: 80
Discharge: HOME | End: 2025-03-19
Payer: COMMERCIAL

## 2025-03-19 DIAGNOSIS — M17.12 PRIMARY OSTEOARTHRITIS OF LEFT KNEE: ICD-10-CM

## 2025-03-19 PROCEDURE — 73721 MRI JNT OF LWR EXTRE W/O DYE: CPT

## 2025-03-28 ENCOUNTER — OFFICE VISIT (OUTPATIENT)
Dept: OBGYN CLINIC | Facility: CLINIC | Age: 80
End: 2025-03-28
Payer: COMMERCIAL

## 2025-03-28 DIAGNOSIS — S83.242A TEAR OF MEDIAL MENISCUS OF LEFT KNEE, CURRENT, UNSPECIFIED TEAR TYPE, INITIAL ENCOUNTER: Primary | ICD-10-CM

## 2025-03-28 PROCEDURE — 99213 OFFICE O/P EST LOW 20 MIN: CPT | Performed by: STUDENT IN AN ORGANIZED HEALTH CARE EDUCATION/TRAINING PROGRAM

## 2025-03-28 NOTE — PROGRESS NOTES
Date: 25  Paige Dumas   MRN# [id number]  : 1945    Assessment & Plan  Tear of medial meniscus of left knee, current, unspecified tear type, initial encounter    Orders:    Ambulatory Referral to Orthopedic Surgery; Future  - WBAT  - Activity modification to limit strain or impact on the joint as needed  - NSAIDs as needed  - Tylenol 1000mg up to three times daily as needed. Do not exceed 3000mg daily  - Continue PT/HEP as directed  - Knee sleeve or brace for comfort as needed  - Cane or walker recommended to offload joint as needed  - Patient may follow up with Dr. Del Rio for further evaluation and treatment of the left knee medial meniscus tear      Chief Complaint: Left Knee Pain    Subjective:   Paige Dumas is a 79 y.o. female who is being seen for follow-up for Left knee pain and MRI review. Patient continues to experience pain and mechanical symptoms in the knee with weight bearing related activity. She has exhausted all conservative treatment options with little to no relief at this time. She denies distal numbness or tingling. No other orthopedic complaints or concerns.       Prior treatment:  NSAIDs Yes    Bracing Yes   Physical Therapy Yes   Cortisone Injections Yes   Viscosupplementation No     Allergy:  No Known Allergies  Medications:  All current active meds have been reviewed   Past Medical History:  Past Medical History:   Diagnosis Date    Arthritis     Chest pain     Edema of lower extremity     Esophageal reflux     Hx of cardiac cath     Hypertension      Past Surgical History:  Past Surgical History:   Procedure Laterality Date    BLADDER SURGERY      COLONOSCOPY      EGD       Family History:  Family History   Problem Relation Age of Onset    COPD Mother     Emphysema Mother     Heart disease Sister     No Known Problems Brother      Social History:  Social History     Substance and Sexual Activity   Alcohol Use No     Social History     Substance and Sexual  "Activity   Drug Use Never     Social History     Tobacco Use   Smoking Status Never   Smokeless Tobacco Never           Review of Systems:  General- denies fever/chills  HEENT- denies hearing loss or sore throat  Eyes- denies eye pain or visual disturbances, denies red eyes  Respiratory- denies cough or SOB  Cardio- denies chest pain or palpitations  GI- denies abdominal pain  Endocrine- denies urinary frequency  Urinary- denies pain with urination  Musculoskeletal- Negative except noted above  Skin- denies rashes or wounds  Neurological- denies dizziness or headache  Psychiatric- denies anxiety or difficulty concentrating    Objective:   BP Readings from Last 1 Encounters:   10/26/23 169/79      Wt Readings from Last 1 Encounters:   03/04/25 67.6 kg (149 lb)      Pulse Readings from Last 1 Encounters:   10/26/23 78        BMI: Estimated body mass index is 25.58 kg/m² as calculated from the following:    Height as of 3/4/25: 5' 4\" (1.626 m).    Weight as of 3/4/25: 67.6 kg (149 lb).    Physical Exam  There were no vitals taken for this visit.  General/Constitutional: No apparent distress: well-nourished and well developed.  Eyes: normal ocular motion  Cardio: RRR, Normal S1S2, No m/r/g.   Lymphatic: No appreciable lymphadenopathy  Respiratory: Non-labored breathing, CTA b/l no w/c/r  Vascular: No edema, swelling or tenderness, except as noted in detailed exam. Extremities well perfused. No LE edema  Integumentary: No impressive skin lesions present, except as noted in detailed exam.  Neuro: No ataxia or tremors noted  Psych: Normal mood and affect, oriented to person, place and time. Appropriate affect.  Musculoskeletal: Normal, except as noted in detailed exam and in HPI.    Gait and Station:   normal    Left Knee Exam:      Inspection:  normal color, temperature, turgor and moisture    Overall limb alignment: neutral    Effusion: no    ROM <0 to 130 with pain    Extensor Lag: Absent    Palpation: Medial and " Lateral joint line tenderness to palpation  Positive Ezio's  Positive Thessaly's    stable to AP translation at 90 deg    Coronal plane stable in full extension    Coronal plane stable in mid-flexion     Motor: 5/5 EHL/FHL/TA/GS/Qd/Hs    Vascular: Toes WWP with BCR    Sensory: SILT DP/SP/Kat/Saph/Ti      Images:  I personally reviewed relevant images in the PACS system and my interpretation is as follows:    MRI available for review of Left knee from 3/19/2025 was available for review which demonstrates Small oblique tear through the body of the medial meniscus. Mild patellofemoral compartment cartilage thinning. Tiny focal chondral thinning in the posterior medial femoral condyle with underlying tiny subchondral cystic change       Scribe Attestation      I,:  Jhoan Gillette am acting as a scribe while in the presence of the attending physician.:       I,:  Dave Bhatt MD personally performed the services described in this documentation    as scribed in my presence.:               Dave Bhatt MD  Adult Reconstruction Specialist   [facility]

## 2025-03-31 ENCOUNTER — APPOINTMENT (OUTPATIENT)
Dept: LAB | Age: 80
End: 2025-03-31
Payer: COMMERCIAL

## 2025-03-31 ENCOUNTER — OFFICE VISIT (OUTPATIENT)
Dept: OBGYN CLINIC | Facility: CLINIC | Age: 80
End: 2025-03-31
Payer: COMMERCIAL

## 2025-03-31 VITALS — BODY MASS INDEX: 25.44 KG/M2 | HEIGHT: 64 IN | WEIGHT: 149 LBS

## 2025-03-31 DIAGNOSIS — R93.7 ABNORMAL FINDINGS ON DIAGNOSTIC IMAGING OF OTHER PARTS OF MUSCULOSKELETAL SYSTEM: ICD-10-CM

## 2025-03-31 DIAGNOSIS — S83.242A OTHER TEAR OF MEDIAL MENISCUS OF LEFT KNEE, UNSPECIFIED WHETHER OLD OR CURRENT TEAR, INITIAL ENCOUNTER: ICD-10-CM

## 2025-03-31 DIAGNOSIS — S83.242A TEAR OF MEDIAL MENISCUS OF LEFT KNEE, CURRENT, UNSPECIFIED TEAR TYPE, INITIAL ENCOUNTER: ICD-10-CM

## 2025-03-31 DIAGNOSIS — S83.242A OTHER TEAR OF MEDIAL MENISCUS OF LEFT KNEE, UNSPECIFIED WHETHER OLD OR CURRENT TEAR, INITIAL ENCOUNTER: Primary | ICD-10-CM

## 2025-03-31 LAB
ALBUMIN SERPL BCG-MCNC: 4.5 G/DL (ref 3.5–5)
ALP SERPL-CCNC: 89 U/L (ref 34–104)
ALT SERPL W P-5'-P-CCNC: 11 U/L (ref 7–52)
ANION GAP SERPL CALCULATED.3IONS-SCNC: 10 MMOL/L (ref 4–13)
AST SERPL W P-5'-P-CCNC: 18 U/L (ref 13–39)
ATRIAL RATE: 64 BPM
BASOPHILS # BLD AUTO: 0.05 THOUSANDS/ÂΜL (ref 0–0.1)
BASOPHILS NFR BLD AUTO: 1 % (ref 0–1)
BILIRUB SERPL-MCNC: 0.54 MG/DL (ref 0.2–1)
BUN SERPL-MCNC: 17 MG/DL (ref 5–25)
CALCIUM SERPL-MCNC: 10 MG/DL (ref 8.4–10.2)
CHLORIDE SERPL-SCNC: 102 MMOL/L (ref 96–108)
CO2 SERPL-SCNC: 29 MMOL/L (ref 21–32)
CREAT SERPL-MCNC: 0.93 MG/DL (ref 0.6–1.3)
EOSINOPHIL # BLD AUTO: 0.13 THOUSAND/ÂΜL (ref 0–0.61)
EOSINOPHIL NFR BLD AUTO: 1 % (ref 0–6)
ERYTHROCYTE [DISTWIDTH] IN BLOOD BY AUTOMATED COUNT: 12.8 % (ref 11.6–15.1)
GFR SERPL CREATININE-BSD FRML MDRD: 58 ML/MIN/1.73SQ M
GLUCOSE P FAST SERPL-MCNC: 94 MG/DL (ref 65–99)
HCT VFR BLD AUTO: 38.5 % (ref 34.8–46.1)
HGB BLD-MCNC: 12.4 G/DL (ref 11.5–15.4)
IMM GRANULOCYTES # BLD AUTO: 0.04 THOUSAND/UL (ref 0–0.2)
IMM GRANULOCYTES NFR BLD AUTO: 0 % (ref 0–2)
LYMPHOCYTES # BLD AUTO: 3.23 THOUSANDS/ÂΜL (ref 0.6–4.47)
LYMPHOCYTES NFR BLD AUTO: 33 % (ref 14–44)
MCH RBC QN AUTO: 29.5 PG (ref 26.8–34.3)
MCHC RBC AUTO-ENTMCNC: 32.2 G/DL (ref 31.4–37.4)
MCV RBC AUTO: 92 FL (ref 82–98)
MONOCYTES # BLD AUTO: 0.79 THOUSAND/ÂΜL (ref 0.17–1.22)
MONOCYTES NFR BLD AUTO: 8 % (ref 4–12)
NEUTROPHILS # BLD AUTO: 5.53 THOUSANDS/ÂΜL (ref 1.85–7.62)
NEUTS SEG NFR BLD AUTO: 57 % (ref 43–75)
NRBC BLD AUTO-RTO: 0 /100 WBCS
P AXIS: 62 DEGREES
PLATELET # BLD AUTO: 315 THOUSANDS/UL (ref 149–390)
PMV BLD AUTO: 10.3 FL (ref 8.9–12.7)
POTASSIUM SERPL-SCNC: 3.5 MMOL/L (ref 3.5–5.3)
PR INTERVAL: 192 MS
PROT SERPL-MCNC: 7.5 G/DL (ref 6.4–8.4)
QRS AXIS: -4 DEGREES
QRSD INTERVAL: 146 MS
QT INTERVAL: 430 MS
QTC INTERVAL: 444 MS
RBC # BLD AUTO: 4.2 MILLION/UL (ref 3.81–5.12)
SODIUM SERPL-SCNC: 141 MMOL/L (ref 135–147)
T WAVE AXIS: 78 DEGREES
VENTRICULAR RATE: 64 BPM
WBC # BLD AUTO: 9.77 THOUSAND/UL (ref 4.31–10.16)

## 2025-03-31 PROCEDURE — 36415 COLL VENOUS BLD VENIPUNCTURE: CPT

## 2025-03-31 PROCEDURE — 93010 ELECTROCARDIOGRAM REPORT: CPT | Performed by: INTERNAL MEDICINE

## 2025-03-31 PROCEDURE — 99215 OFFICE O/P EST HI 40 MIN: CPT | Performed by: ORTHOPAEDIC SURGERY

## 2025-03-31 PROCEDURE — 85025 COMPLETE CBC W/AUTO DIFF WBC: CPT

## 2025-03-31 PROCEDURE — 80053 COMPREHEN METABOLIC PANEL: CPT

## 2025-03-31 RX ORDER — CHLORHEXIDINE GLUCONATE ORAL RINSE 1.2 MG/ML
15 SOLUTION DENTAL ONCE
Status: CANCELLED | OUTPATIENT
Start: 2025-04-10 | End: 2025-03-31

## 2025-03-31 RX ORDER — CEFAZOLIN SODIUM 2 G/50ML
2000 SOLUTION INTRAVENOUS ONCE
Status: CANCELLED | OUTPATIENT
Start: 2025-04-10 | End: 2025-03-31

## 2025-03-31 NOTE — PROGRESS NOTES
Sports Medicine and Shoulder Surgery    Paige Dumas, 79 y.o. female   MRN# [id number]   : 1945        Assessment & Plan  Tear of medial meniscus of left knee, current, unspecified tear type, initial encounter    Orders:    Ambulatory Referral to Orthopedic Surgery    Other tear of medial meniscus of left knee, unspecified whether old or current tear, initial encounter    Orders:    Case request operating room: Knee arthroscopy for medial meniscus debridement versus repair; Standing    Ambulatory referral to Family Practice; Future    Comprehensive metabolic panel; Future    CBC and differential; Future    EKG 12 lead; Future    Abnormal findings on diagnostic imaging of other parts of musculoskeletal system    Orders:    CBC and differential; Future           -- We discussed the importance of injury to the meniscus. We also discussed the role of the menisci as a shock absorber, but also as a secondary stabilizer of the knee in terms of stability. We also described that there can be associated wear and degeneration of the articular cartilage which may also be playing a role in the patient's symptoms. Sometimes this can be difficult to distinguish from meniscus symptoms.    -- Options for treatment of the meniscus include partial meniscectomy versus repair. Options for treatment of the meniscus include partial meniscectomy versus repair in this setting. If repair is considered, this is more preserving for the meniscus tissue. However, there is also a chance a repair may not heal.  -- We discussed the typical rehabilitation following meniscus partial debridement and meniscus repair. There is certainly a risk of re injury returning to twisting and pivoting activities. We discussed risks of surgery, which include knee stiffness, infection, risk of reinjury and future arthritis.  -- Pending medical clearance if required, we will plan to proceed with the scheduled procedure. Patient deemed an appropriate  candidate based on clinical evaluation and imaging findings. Preoperative workup to include laboratory studies (CBC, CMP, coags, EKG), pre-surgical clearance with (primary care, cardiology, others as indicated).   -- Patient will call to schedule knee arthroscopy with meniscus debridement as indicated.             Chief Complaint:    Left Knee Pain and Dysfunction    Subjective:   Patient comes in for evaluation of the knee.  Patient was previously following with Dr. Roper and Dr. Bhatt for evaluation of left knee pain.  She had trialed conservative measures including a steroid injection with Dr. Brewster on November 21, 2024.  Patient unfortunately continued to experience pain in the left knee so an MRI was ordered and she was referred to orthopedic sports surgery for further evaluation.  Today, patient comes in for evaluation of the left knee.  Patient said that she has been having left knee pain for the past 2 years. She describes the pain primarily on the posterior aspect of the knee. She feels bending worsens the knee. She mentions a fall a couple years ago but otherwise denies any recent injury or trauma. She said she tried physical therapy which was not helpful. She said the steroid injection did not provide any relief.  Overall, patient feels her pain is getting worse. Patient feels her symptoms are disabling and affecting his activities of daily living.    Physical Examination:    Musculoskeletal: left Knee Examination:  General: The patient is alert, oriented, and pleasant to interact with.  Patient ambulates with Normal gait pattern  Assistive Device: No  Alignment: mild valgus  Skin is warm and dry to touch with no signs of erythema, ecchymosis, or infection   Effusion: minimal  ROM: 0° - 135°    MMT: 5/5 throughout  TTP: Medial joint line, Lateral joint line, and posterior knee  Flexor and extensor mechanisms are intact   Knee is stable to varus and valgus stress  Ezio's Test Positive    Lachman's Test - 1A  Calf compartments are soft and supple  2+ DP and PT pulses with brisk capillary refill to the toes  Sural, saphenous, tibial, superficial, and deep peroneal motor and sensory distributions intact  Sensation light touch intact distally       Imaging Studies:  Independent interpretation of the knee MRI demonstrates oblique tear of medial meniscus around the body, signal noted in the lateral meniscus suggestive of a tear       Allergies:  No Known Allergies    Medications:    Current Outpatient Medications:     ALPRAZolam (XANAX) 0.25 mg tablet, Take 1 tablet (0.25 mg total) by mouth 2 (two) times a day as needed for anxiety, Disp: 3 tablet, Rfl: 0    clobetasol (TEMOVATE) 0.05 % cream, Apply 5 g (1 application total) topically 2 (two) times a day, Disp: 30 g, Rfl: 0    diltiazem (CARDIZEM CD) 240 mg 24 hr capsule, Take 1 capsule by mouth in the morning, Disp: , Rfl:     diltiazem (Dilt-XR) 240 MG 24 hr capsule, Take 1 capsule (240 mg total) by mouth daily, Disp: 90 capsule, Rfl: 5    famotidine (PEPCID) 40 MG tablet, take 1 tablet by mouth once daily, Disp: 30 tablet, Rfl: 5    FLUoxetine (PROzac) 20 mg capsule, Take 1 capsule by mouth every morning, Disp: , Rfl:     lisinopril-hydrochlorothiazide (PRINZIDE,ZESTORETIC) 20-12.5 MG per tablet, take 1 tablet by mouth once daily, Disp: 90 tablet, Rfl: 5    perphenazine 2 mg tablet, , Disp: , Rfl:     trospium chloride (SANCTURA) 20 mg tablet, Take 20 mg by mouth 2 (two) times a day, Disp: , Rfl:     atorvastatin (LIPITOR) 40 mg tablet, Take 1 tablet (40 mg total) by mouth daily, Disp: 90 tablet, Rfl: 5    ciclopirox (LOPROX) 0.77 % cream, APPLY TO AFFECTED AREA UNDER THE BREAST AS NEEDED FOR FLARES. (Patient not taking: Reported on 7/12/2023), Disp: , Rfl:     cyclopentolate (CYCLOGYL) 1 % ophthalmic solution, , Disp: , Rfl:     Diclofenac Sodium (VOLTAREN) 1 %, Apply 2 g topically 4 (four) times a day, Disp: 240 g, Rfl: 0    docusate sodium (Colace)  100 mg capsule, Take 2 capsules (200 mg total) by mouth 2 (two) times a day, Disp: 120 capsule, Rfl: 5    FLUAD 0.5 ML BERNABE, inject 0.5 milliliter intramuscularly (Patient not taking: Reported on 6/28/2022), Disp: , Rfl: 0    hydrocortisone 2.5 % cream, Apply 1 application topically 2 (two) times a day (Patient not taking: Reported on 7/12/2023), Disp: , Rfl: 0    nystatin (MYCOSTATIN) cream, Apply topically 2 (two) times a day, Disp: 30 g, Rfl: 0    omeprazole (PriLOSEC) 40 MG capsule, Take 1 capsule (40 mg total) by mouth daily, Disp: 90 capsule, Rfl: 5    Past Medical History:  Past Medical History:   Diagnosis Date    Arthritis     Chest pain     Edema of lower extremity     Esophageal reflux     Hx of cardiac cath     Hypertension         Past Surgical History:  Past Surgical History:   Procedure Laterality Date    BLADDER SURGERY      COLONOSCOPY      EGD          Family History:  Family History   Problem Relation Age of Onset    COPD Mother     Emphysema Mother     Heart disease Sister     No Known Problems Brother         Social History:  Social History     Socioeconomic History    Marital status: Single     Spouse name: Not on file    Number of children: Not on file    Years of education: Not on file    Highest education level: Not on file   Occupational History    Not on file   Tobacco Use    Smoking status: Never    Smokeless tobacco: Never   Vaping Use    Vaping status: Never Used   Substance and Sexual Activity    Alcohol use: No    Drug use: Never    Sexual activity: Not Currently     Partners: Male   Other Topics Concern    Not on file   Social History Narrative    Caffeine use    Tea use     Social Drivers of Health     Financial Resource Strain: Low Risk  (3/16/2021)    Overall Financial Resource Strain (CARDIA)     Difficulty of Paying Living Expenses: Not hard at all   Food Insecurity: No Food Insecurity (3/16/2021)    Hunger Vital Sign     Worried About Running Out of Food in the Last Year: Never  true     Ran Out of Food in the Last Year: Never true   Transportation Needs: No Transportation Needs (3/16/2021)    PRAPARE - Transportation     Lack of Transportation (Medical): No     Lack of Transportation (Non-Medical): No   Physical Activity: Not on file   Stress: Not on file   Social Connections: Not on file   Intimate Partner Violence: Not on file   Housing Stability: Not on file        Review of Systems:  General- denies fever/chills  HEENT- denies hearing loss or sore throat  Eyes- denies eye pain or visual disturbances, denies red eyes  Respiratory- denies cough or SOB  Cardio- denies chest pain or palpitations  GI- denies abdominal pain  Endocrine- denies urinary frequency  Urinary- denies pain with urination  Musculoskeletal- Negative except noted above  Skin- denies rashes or wounds  Neurological- denies dizziness or headache  Psychiatric- denies anxiety or difficulty concentrating     Objective:   Body mass index is 25.58 kg/m².      ---------------------------------------------------------------------  Gael Del Rio MD, PhD   Orthopedic Surgery,    Sports Medicine and Shoulder Surgery    The complexity of the medical decision making, including the comprehensive history, detailed examination and moderate risk assessment, supports coding at a Level 5 for this encounter     Scribe Attestation      I,:  Josh Abrams PA-C am acting as a scribe while in the presence of the attending physician.:       I,:  Gael Del Rio MD personally performed the services described in this documentation    as scribed in my presence.:

## 2025-03-31 NOTE — H&P (VIEW-ONLY)
Sports Medicine and Shoulder Surgery    Paige Dumas, 79 y.o. female   MRN# [id number]   : 1945        Assessment & Plan  Tear of medial meniscus of left knee, current, unspecified tear type, initial encounter    Orders:    Ambulatory Referral to Orthopedic Surgery    Other tear of medial meniscus of left knee, unspecified whether old or current tear, initial encounter    Orders:    Case request operating room: Knee arthroscopy for medial meniscus debridement versus repair; Standing    Ambulatory referral to Family Practice; Future    Comprehensive metabolic panel; Future    CBC and differential; Future    EKG 12 lead; Future    Abnormal findings on diagnostic imaging of other parts of musculoskeletal system    Orders:    CBC and differential; Future           -- We discussed the importance of injury to the meniscus. We also discussed the role of the menisci as a shock absorber, but also as a secondary stabilizer of the knee in terms of stability. We also described that there can be associated wear and degeneration of the articular cartilage which may also be playing a role in the patient's symptoms. Sometimes this can be difficult to distinguish from meniscus symptoms.    -- Options for treatment of the meniscus include partial meniscectomy versus repair. Options for treatment of the meniscus include partial meniscectomy versus repair in this setting. If repair is considered, this is more preserving for the meniscus tissue. However, there is also a chance a repair may not heal.  -- We discussed the typical rehabilitation following meniscus partial debridement and meniscus repair. There is certainly a risk of re injury returning to twisting and pivoting activities. We discussed risks of surgery, which include knee stiffness, infection, risk of reinjury and future arthritis.  -- Pending medical clearance if required, we will plan to proceed with the scheduled procedure. Patient deemed an appropriate  candidate based on clinical evaluation and imaging findings. Preoperative workup to include laboratory studies (CBC, CMP, coags, EKG), pre-surgical clearance with (primary care, cardiology, others as indicated).   -- Patient will call to schedule knee arthroscopy with meniscus debridement as indicated.             Chief Complaint:    Left Knee Pain and Dysfunction    Subjective:   Patient comes in for evaluation of the knee.  Patient was previously following with Dr. Roper and Dr. Bhatt for evaluation of left knee pain.  She had trialed conservative measures including a steroid injection with Dr. Brewster on November 21, 2024.  Patient unfortunately continued to experience pain in the left knee so an MRI was ordered and she was referred to orthopedic sports surgery for further evaluation.  Today, patient comes in for evaluation of the left knee.  Patient said that she has been having left knee pain for the past 2 years. She describes the pain primarily on the posterior aspect of the knee. She feels bending worsens the knee. She mentions a fall a couple years ago but otherwise denies any recent injury or trauma. She said she tried physical therapy which was not helpful. She said the steroid injection did not provide any relief.  Overall, patient feels her pain is getting worse. Patient feels her symptoms are disabling and affecting his activities of daily living.    Physical Examination:    Musculoskeletal: left Knee Examination:  General: The patient is alert, oriented, and pleasant to interact with.  Patient ambulates with Normal gait pattern  Assistive Device: No  Alignment: mild valgus  Skin is warm and dry to touch with no signs of erythema, ecchymosis, or infection   Effusion: minimal  ROM: 0° - 135°    MMT: 5/5 throughout  TTP: Medial joint line, Lateral joint line, and posterior knee  Flexor and extensor mechanisms are intact   Knee is stable to varus and valgus stress  Ezio's Test Positive    Lachman's Test - 1A  Calf compartments are soft and supple  2+ DP and PT pulses with brisk capillary refill to the toes  Sural, saphenous, tibial, superficial, and deep peroneal motor and sensory distributions intact  Sensation light touch intact distally       Imaging Studies:  Independent interpretation of the knee MRI demonstrates oblique tear of medial meniscus around the body, signal noted in the lateral meniscus suggestive of a tear       Allergies:  No Known Allergies    Medications:    Current Outpatient Medications:     ALPRAZolam (XANAX) 0.25 mg tablet, Take 1 tablet (0.25 mg total) by mouth 2 (two) times a day as needed for anxiety, Disp: 3 tablet, Rfl: 0    clobetasol (TEMOVATE) 0.05 % cream, Apply 5 g (1 application total) topically 2 (two) times a day, Disp: 30 g, Rfl: 0    diltiazem (CARDIZEM CD) 240 mg 24 hr capsule, Take 1 capsule by mouth in the morning, Disp: , Rfl:     diltiazem (Dilt-XR) 240 MG 24 hr capsule, Take 1 capsule (240 mg total) by mouth daily, Disp: 90 capsule, Rfl: 5    famotidine (PEPCID) 40 MG tablet, take 1 tablet by mouth once daily, Disp: 30 tablet, Rfl: 5    FLUoxetine (PROzac) 20 mg capsule, Take 1 capsule by mouth every morning, Disp: , Rfl:     lisinopril-hydrochlorothiazide (PRINZIDE,ZESTORETIC) 20-12.5 MG per tablet, take 1 tablet by mouth once daily, Disp: 90 tablet, Rfl: 5    perphenazine 2 mg tablet, , Disp: , Rfl:     trospium chloride (SANCTURA) 20 mg tablet, Take 20 mg by mouth 2 (two) times a day, Disp: , Rfl:     atorvastatin (LIPITOR) 40 mg tablet, Take 1 tablet (40 mg total) by mouth daily, Disp: 90 tablet, Rfl: 5    ciclopirox (LOPROX) 0.77 % cream, APPLY TO AFFECTED AREA UNDER THE BREAST AS NEEDED FOR FLARES. (Patient not taking: Reported on 7/12/2023), Disp: , Rfl:     cyclopentolate (CYCLOGYL) 1 % ophthalmic solution, , Disp: , Rfl:     Diclofenac Sodium (VOLTAREN) 1 %, Apply 2 g topically 4 (four) times a day, Disp: 240 g, Rfl: 0    docusate sodium (Colace)  100 mg capsule, Take 2 capsules (200 mg total) by mouth 2 (two) times a day, Disp: 120 capsule, Rfl: 5    FLUAD 0.5 ML BERNABE, inject 0.5 milliliter intramuscularly (Patient not taking: Reported on 6/28/2022), Disp: , Rfl: 0    hydrocortisone 2.5 % cream, Apply 1 application topically 2 (two) times a day (Patient not taking: Reported on 7/12/2023), Disp: , Rfl: 0    nystatin (MYCOSTATIN) cream, Apply topically 2 (two) times a day, Disp: 30 g, Rfl: 0    omeprazole (PriLOSEC) 40 MG capsule, Take 1 capsule (40 mg total) by mouth daily, Disp: 90 capsule, Rfl: 5    Past Medical History:  Past Medical History:   Diagnosis Date    Arthritis     Chest pain     Edema of lower extremity     Esophageal reflux     Hx of cardiac cath     Hypertension         Past Surgical History:  Past Surgical History:   Procedure Laterality Date    BLADDER SURGERY      COLONOSCOPY      EGD          Family History:  Family History   Problem Relation Age of Onset    COPD Mother     Emphysema Mother     Heart disease Sister     No Known Problems Brother         Social History:  Social History     Socioeconomic History    Marital status: Single     Spouse name: Not on file    Number of children: Not on file    Years of education: Not on file    Highest education level: Not on file   Occupational History    Not on file   Tobacco Use    Smoking status: Never    Smokeless tobacco: Never   Vaping Use    Vaping status: Never Used   Substance and Sexual Activity    Alcohol use: No    Drug use: Never    Sexual activity: Not Currently     Partners: Male   Other Topics Concern    Not on file   Social History Narrative    Caffeine use    Tea use     Social Drivers of Health     Financial Resource Strain: Low Risk  (3/16/2021)    Overall Financial Resource Strain (CARDIA)     Difficulty of Paying Living Expenses: Not hard at all   Food Insecurity: No Food Insecurity (3/16/2021)    Hunger Vital Sign     Worried About Running Out of Food in the Last Year: Never  true     Ran Out of Food in the Last Year: Never true   Transportation Needs: No Transportation Needs (3/16/2021)    PRAPARE - Transportation     Lack of Transportation (Medical): No     Lack of Transportation (Non-Medical): No   Physical Activity: Not on file   Stress: Not on file   Social Connections: Not on file   Intimate Partner Violence: Not on file   Housing Stability: Not on file        Review of Systems:  General- denies fever/chills  HEENT- denies hearing loss or sore throat  Eyes- denies eye pain or visual disturbances, denies red eyes  Respiratory- denies cough or SOB  Cardio- denies chest pain or palpitations  GI- denies abdominal pain  Endocrine- denies urinary frequency  Urinary- denies pain with urination  Musculoskeletal- Negative except noted above  Skin- denies rashes or wounds  Neurological- denies dizziness or headache  Psychiatric- denies anxiety or difficulty concentrating     Objective:   Body mass index is 25.58 kg/m².      ---------------------------------------------------------------------  Gael Del Rio MD, PhD   Orthopedic Surgery, [facility]   Sports Medicine and Shoulder Surgery    The complexity of the medical decision making, including the comprehensive history, detailed examination and moderate risk assessment, supports coding at a Level 5 for this encounter     Scribe Attestation      I,:  Josh Abrams PA-C am acting as a scribe while in the presence of the attending physician.:       I,:  Gael Del Rio MD personally performed the services described in this documentation    as scribed in my presence.:

## 2025-04-08 ENCOUNTER — ANESTHESIA EVENT (OUTPATIENT)
Dept: PERIOP | Facility: HOSPITAL | Age: 80
End: 2025-04-08
Payer: COMMERCIAL

## 2025-04-10 ENCOUNTER — ANESTHESIA (OUTPATIENT)
Dept: PERIOP | Facility: HOSPITAL | Age: 80
End: 2025-04-10
Payer: COMMERCIAL

## 2025-04-10 ENCOUNTER — HOSPITAL ENCOUNTER (OUTPATIENT)
Facility: HOSPITAL | Age: 80
Setting detail: OUTPATIENT SURGERY
Discharge: HOME | End: 2025-04-10
Attending: ORTHOPAEDIC SURGERY | Admitting: ORTHOPAEDIC SURGERY
Payer: COMMERCIAL

## 2025-04-10 VITALS
HEART RATE: 63 BPM | OXYGEN SATURATION: 97 % | DIASTOLIC BLOOD PRESSURE: 72 MMHG | SYSTOLIC BLOOD PRESSURE: 163 MMHG | TEMPERATURE: 97.2 F | RESPIRATION RATE: 18 BRPM

## 2025-04-10 DIAGNOSIS — S83.242A OTHER TEAR OF MEDIAL MENISCUS OF LEFT KNEE, UNSPECIFIED WHETHER OLD OR CURRENT TEAR, INITIAL ENCOUNTER: Primary | ICD-10-CM

## 2025-04-10 PROCEDURE — 29880 ARTHRS KNE SRG MNISECTMY M&L: CPT

## 2025-04-10 PROCEDURE — 29880 ARTHRS KNE SRG MNISECTMY M&L: CPT | Performed by: ORTHOPAEDIC SURGERY

## 2025-04-10 RX ORDER — BUPIVACAINE HYDROCHLORIDE 5 MG/ML
INJECTION, SOLUTION EPIDURAL; INTRACAUDAL; PERINEURAL AS NEEDED
Status: DISCONTINUED | OUTPATIENT
Start: 2025-04-10 | End: 2025-04-10 | Stop reason: HOSPADM

## 2025-04-10 RX ORDER — EPHEDRINE SULFATE 50 MG/ML
INJECTION INTRAVENOUS AS NEEDED
Status: DISCONTINUED | OUTPATIENT
Start: 2025-04-10 | End: 2025-04-10

## 2025-04-10 RX ORDER — PROPOFOL 10 MG/ML
INJECTION, EMULSION INTRAVENOUS AS NEEDED
Status: DISCONTINUED | OUTPATIENT
Start: 2025-04-10 | End: 2025-04-10

## 2025-04-10 RX ORDER — ONDANSETRON 2 MG/ML
INJECTION INTRAMUSCULAR; INTRAVENOUS AS NEEDED
Status: DISCONTINUED | OUTPATIENT
Start: 2025-04-10 | End: 2025-04-10

## 2025-04-10 RX ORDER — CHLORHEXIDINE GLUCONATE ORAL RINSE 1.2 MG/ML
15 SOLUTION DENTAL ONCE
Status: COMPLETED | OUTPATIENT
Start: 2025-04-10 | End: 2025-04-10

## 2025-04-10 RX ORDER — FENTANYL CITRATE/PF 50 MCG/ML
25 SYRINGE (ML) INJECTION
Status: DISCONTINUED | OUTPATIENT
Start: 2025-04-10 | End: 2025-04-10 | Stop reason: HOSPADM

## 2025-04-10 RX ORDER — SODIUM CHLORIDE, SODIUM LACTATE, POTASSIUM CHLORIDE, CALCIUM CHLORIDE 600; 310; 30; 20 MG/100ML; MG/100ML; MG/100ML; MG/100ML
125 INJECTION, SOLUTION INTRAVENOUS CONTINUOUS
Status: DISCONTINUED | OUTPATIENT
Start: 2025-04-10 | End: 2025-04-10 | Stop reason: HOSPADM

## 2025-04-10 RX ORDER — LIDOCAINE HYDROCHLORIDE 20 MG/ML
INJECTION, SOLUTION EPIDURAL; INFILTRATION; INTRACAUDAL; PERINEURAL AS NEEDED
Status: DISCONTINUED | OUTPATIENT
Start: 2025-04-10 | End: 2025-04-10

## 2025-04-10 RX ORDER — ONDANSETRON 2 MG/ML
4 INJECTION INTRAMUSCULAR; INTRAVENOUS ONCE AS NEEDED
Status: DISCONTINUED | OUTPATIENT
Start: 2025-04-10 | End: 2025-04-10 | Stop reason: HOSPADM

## 2025-04-10 RX ORDER — SODIUM CHLORIDE, SODIUM LACTATE, POTASSIUM CHLORIDE, CALCIUM CHLORIDE 600; 310; 30; 20 MG/100ML; MG/100ML; MG/100ML; MG/100ML
INJECTION, SOLUTION INTRAVENOUS CONTINUOUS PRN
Status: DISCONTINUED | OUTPATIENT
Start: 2025-04-10 | End: 2025-04-10

## 2025-04-10 RX ORDER — MIDAZOLAM HYDROCHLORIDE 2 MG/2ML
INJECTION, SOLUTION INTRAMUSCULAR; INTRAVENOUS AS NEEDED
Status: DISCONTINUED | OUTPATIENT
Start: 2025-04-10 | End: 2025-04-10

## 2025-04-10 RX ORDER — CEFAZOLIN SODIUM 2 G/50ML
SOLUTION INTRAVENOUS AS NEEDED
Status: DISCONTINUED | OUTPATIENT
Start: 2025-04-10 | End: 2025-04-10

## 2025-04-10 RX ORDER — FENTANYL CITRATE 50 UG/ML
INJECTION, SOLUTION INTRAMUSCULAR; INTRAVENOUS AS NEEDED
Status: DISCONTINUED | OUTPATIENT
Start: 2025-04-10 | End: 2025-04-10

## 2025-04-10 RX ORDER — OXYCODONE HYDROCHLORIDE 5 MG/1
5 TABLET ORAL EVERY 4 HOURS PRN
Refills: 0 | Status: DISCONTINUED | OUTPATIENT
Start: 2025-04-10 | End: 2025-04-10 | Stop reason: HOSPADM

## 2025-04-10 RX ORDER — DEXAMETHASONE SODIUM PHOSPHATE 10 MG/ML
INJECTION, SOLUTION INTRAMUSCULAR; INTRAVENOUS AS NEEDED
Status: DISCONTINUED | OUTPATIENT
Start: 2025-04-10 | End: 2025-04-10

## 2025-04-10 RX ORDER — ACETAMINOPHEN 325 MG/1
325 TABLET ORAL EVERY 6 HOURS PRN
Qty: 30 TABLET | Refills: 0 | Status: SHIPPED | OUTPATIENT
Start: 2025-04-10

## 2025-04-10 RX ORDER — CEFAZOLIN SODIUM 2 G/50ML
2000 SOLUTION INTRAVENOUS ONCE
Status: DISCONTINUED | OUTPATIENT
Start: 2025-04-10 | End: 2025-04-10 | Stop reason: HOSPADM

## 2025-04-10 RX ORDER — HYDROMORPHONE HCL/PF 1 MG/ML
0.5 SYRINGE (ML) INJECTION
Status: DISCONTINUED | OUTPATIENT
Start: 2025-04-10 | End: 2025-04-10 | Stop reason: HOSPADM

## 2025-04-10 RX ORDER — OXYCODONE HYDROCHLORIDE 5 MG/1
5 TABLET ORAL EVERY 4 HOURS PRN
Qty: 15 TABLET | Refills: 0 | Status: SHIPPED | OUTPATIENT
Start: 2025-04-10

## 2025-04-10 RX ADMIN — CEFAZOLIN SODIUM 2000 MG: 2 SOLUTION INTRAVENOUS at 13:08

## 2025-04-10 RX ADMIN — SODIUM CHLORIDE, SODIUM LACTATE, POTASSIUM CHLORIDE, AND CALCIUM CHLORIDE: .6; .31; .03; .02 INJECTION, SOLUTION INTRAVENOUS at 13:00

## 2025-04-10 RX ADMIN — FENTANYL CITRATE 50 MCG: 50 INJECTION INTRAMUSCULAR; INTRAVENOUS at 12:59

## 2025-04-10 RX ADMIN — ONDANSETRON 4 MG: 2 INJECTION INTRAMUSCULAR; INTRAVENOUS at 13:12

## 2025-04-10 RX ADMIN — CHLORHEXIDINE GLUCONATE 15 ML: 1.2 SOLUTION ORAL at 10:03

## 2025-04-10 RX ADMIN — MIDAZOLAM 1 MG: 1 INJECTION INTRAMUSCULAR; INTRAVENOUS at 12:59

## 2025-04-10 RX ADMIN — FENTANYL CITRATE 25 MCG: 50 INJECTION INTRAMUSCULAR; INTRAVENOUS at 13:44

## 2025-04-10 RX ADMIN — SODIUM CHLORIDE, SODIUM LACTATE, POTASSIUM CHLORIDE, AND CALCIUM CHLORIDE 125 ML/HR: .6; .31; .03; .02 INJECTION, SOLUTION INTRAVENOUS at 10:04

## 2025-04-10 RX ADMIN — LIDOCAINE HYDROCHLORIDE 100 MG: 20 INJECTION, SOLUTION EPIDURAL; INFILTRATION; INTRACAUDAL at 13:07

## 2025-04-10 RX ADMIN — PROPOFOL 150 MG: 10 INJECTION, EMULSION INTRAVENOUS at 13:07

## 2025-04-10 RX ADMIN — OXYCODONE 5 MG: 5 TABLET ORAL at 15:26

## 2025-04-10 RX ADMIN — FENTANYL CITRATE 25 MCG: 50 INJECTION INTRAMUSCULAR; INTRAVENOUS at 13:40

## 2025-04-10 RX ADMIN — EPHEDRINE SULFATE 10 MG: 50 INJECTION INTRAVENOUS at 13:33

## 2025-04-10 RX ADMIN — DEXAMETHASONE SODIUM PHOSPHATE 10 MG: 10 INJECTION, SOLUTION INTRAMUSCULAR; INTRAVENOUS at 13:12

## 2025-04-10 RX ADMIN — MIDAZOLAM 1 MG: 1 INJECTION INTRAMUSCULAR; INTRAVENOUS at 13:00

## 2025-04-10 NOTE — DISCHARGE INSTR - AVS FIRST PAGE
POSTOPERATIVE INSTRUCTIONS following KNEE MENISECTOMY    MEDICATIONS:  Resume all home medications unless otherwise instructed by your surgeon.  Pain Medication:  Oxycodone 5 mg, 1 tablets every 4 hours as needed  If you were given a regional anesthetic (nerve block), please begin taking the pain medication as soon as you get home, even if you have minimal or no pain.  DO NOT WAIT FOR THE NERVE BLOCK TO WEAR OFF.  Possible side effects include nausea, constipation, and urinary retention.  If you experience these side effects, please call our office for assistance.  Pain med refills are authorized only during office hours (8am-4pm, Mon-Fri).  Anti-Inflammatory:  Tylenol 325 mg, 1-2 tablets every 6 hours for 4 weeks  Take with food.  Stop if you experience nausea, reflux, or stomach pain.  Blood Clot Prevention:  Not Necessary  Pump your foot up and down 20 times per hour while you are less mobile.  If you were previously on an anticoagulation medication (blood thinner) you may begin this the following morning    WOUND CARE:  Keep the dressing clean and dry.  Light drainage may occur the first 2 days postop.  You may remove the dressings and get the incision wet in the shower 72 hours after surgery.  Do not remove steri-strips or sutures.  Do not immerse the incision under water.  Carefully pat the incision dry.  If there is wound drainage, re-apply a fresh dry gauze dressing.  Please call our office ([phone]) if you experience either of the following:  Sudden increase in swelling, redness, or warmth at the surgical site  Excessive incisional drainage that persists beyond the 3rd day after surgery  Oral temperature greater than 101 degrees, not relieved with Tylenol  Shortness of breath, chest pain, nausea, or any other concerning symptoms    SWELLING CONTROL:  Cold Therapy:  The cold therapy device may be used either continuously or only as needed, according to your preference.  Do not let the pad directly touch  "your skin.  Alternatively, apply ice (20 min on, 20 min off) as often as you feel is necessary.  Elevation:  Elevate the entire leg above heart level.  Place pillows under your ankle to keep your knee straight.  Compression:  Apply ACE wraps or a thigh-length compression stocking as needed.    RANGE OF MOTION:  You are allowed full range of motion as tolerated.    IMMOBILIZATION:  None.  You are allowed full range of motion as tolerated.    ACTIVITY:   Bear full weight as tolerated on the operative leg. Use crutches to assist only as needed.  Using Crutches on Stairs:  Going up, lead with your \"good\" (nonoperative) leg.  Going down, lead with your \"bad\" (operative) leg.  Use a hand rail when available.  Knee Extension:  Place a rolled towel or pillow under your ankle for 20-30 minutes 3-5 times per day.  This will help to maintain full knee extension.  Quad Sets:  Sit or lie with your knee straight.  Tighten your quadriceps (front thigh) muscle.  Hold for 3 seconds, then relax.  Repeat 20 times per hour while awake.    PHYSICAL THERAPY:  Begin therapy 5 to 7 days after surgery. We have placed a referral for physical therapy after the procedure to begin knee range of motion and strengthening. Please call to get set-up with physical therapy.    FOLLOW-UP APPOINTMENT:  10-14 days after surgery with:    Dr. Gael Del Rio MD  [facility]  [address], [place], [address] ([facility] Location)  [address] [place], [address] ([facility])  [phone]   "

## 2025-04-10 NOTE — ANESTHESIA POSTPROCEDURE EVALUATION
Post-Op Assessment Note    CV Status:  Stable  Pain Score: 0    Pain management: adequate       Mental Status:  Awake   Hydration Status:  Stable   PONV Controlled:  None   Airway Patency:  Patent     Post Op Vitals Reviewed: Yes    No anethesia notable event occurred.    Staff: Anesthesiologist, CRNA   Comments: vss          Last Filed PACU Vitals:  Vitals Value Taken Time   Temp 97.1    Pulse 70 04/10/25 1416   /70 04/10/25 1412   Resp 18 04/10/25 1416   SpO2 96 % 04/10/25 1416   Vitals shown include unfiled device data.

## 2025-04-10 NOTE — ANESTHESIA PREPROCEDURE EVALUATION
Procedure:  Knee arthroscopy for medial meniscus debridement versus repair (Left: Knee)    Relevant Problems   ANESTHESIA (within normal limits)      CARDIO   (+) Aortic valve calcification   (+) Heart murmur   (+) Hypercholesterolemia   (+) Hypertension      GI/HEPATIC   (+) Esophageal dysphagia   (+) GERD (gastroesophageal reflux disease)      /RENAL   (+) Stage 3a chronic kidney disease (HCC)      MUSCULOSKELETAL   (+) Low back pain   (+) Primary osteoarthritis of left knee   (+) Primary osteoarthritis of right knee      NEURO/PSYCH   (+) Anxiety disorder   (+) Chronic right SI joint pain   (+) Depression      Other   (+) Urinary frequency        Physical Exam    Airway    Mallampati score: II         Dental   No notable dental hx     Cardiovascular  Murmur, Cardiovascular exam normal    Pulmonary  Pulmonary exam normal Breath sounds clear to auscultation    Other Findings  post-pubertal.      Anesthesia Plan  ASA Score- 3     Anesthesia Type- general with ASA Monitors.         Additional Monitors:     Airway Plan: LMA.    Comment: IA injection .       Plan Factors-    Chart reviewed.   Existing labs reviewed. Patient summary reviewed.    Patient is not a current smoker.              Induction- intravenous.    Postoperative Plan-         Informed Consent- Anesthetic plan and risks discussed with patient.        NPO Status:  Vitals Value Taken Time   Date of last liquid 04/10/25 04/10/25 0956   Time of last liquid 0722 04/10/25 0956   Date of last solid 04/09/25 04/10/25 0956   Time of last solid 1500 04/10/25 0956

## 2025-04-10 NOTE — ANESTHESIA POSTPROCEDURE EVALUATION
Post-Op Assessment Note    CV Status:  Stable    Pain management: adequate       Mental Status:  Alert and awake   Hydration Status:  Euvolemic   PONV Controlled:  Controlled   Airway Patency:  Patent     Post Op Vitals Reviewed: Yes    No anethesia notable event occurred.    Staff: Anesthesiologist           Last Filed PACU Vitals:  Vitals Value Taken Time   Temp 97.2 °F (36.2 °C) 04/10/25 1445   Pulse 62 04/10/25 1456   /68 04/10/25 1445   Resp 15 04/10/25 1456   SpO2 97 % 04/10/25 1456   Vitals shown include unfiled device data.    Modified Carson:     Vitals Value Taken Time   Activity 2 04/10/25 1445   Respiration 2 04/10/25 1445   Circulation 2 04/10/25 1445   Consciousness 2 04/10/25 1445   Oxygen Saturation 2 04/10/25 1445     Modified Carson Score: 10

## 2025-04-11 NOTE — OP NOTE
OPERATIVE REPORT  PATIENT NAME: Paige Dumas    :  1945  MRN: [id number]  Pt Location: [facility] OR ROOM 05    SURGERY DATE: 4/10/2025    Surgeons and Role:     * Gael Del Rio MD - Primary     * Josh Abrams PA-C    Preop Diagnosis:  Other tear of medial meniscus of left knee, unspecified whether old or current tear, initial encounter [S83.242A]    Post-Op Diagnosis Codes:     * Other tear of medial meniscus of left knee, unspecified whether old or current tear, initial encounter [S83.242A]    Procedure(s):  LEFT KNEE:  Knee arthroscopy,   1.  Medial meniscus debridement  2.  Lateral meniscus debridement     Specimen(s):  * No specimens in log *    Estimated Blood Loss:   Minimal    Drains:  * No LDAs found *    Anesthesia Type:   General    Operative Indications:  Other tear of medial meniscus of left knee, unspecified whether old or current tear, initial encounter [S83.242A]      Operative Findings:  Complex tear posterior horn lateral meniscus  Complex tear posterior horn medial meniscus      Complications:   None    Procedure and Technique:  PROCEDURE(S)   LEFT KNEE:  Knee arthroscopy,   1.  Medial meniscus debridement  2.  Lateral meniscus debridement     WOUND TYPE: 1     DRAINS  None.     COMPLICATIONS  None.     FINDINGS:  See below      ANESTHESIA TYPE  Regional + LMA     IMPLANTS:  * No implants in log *      ESTIMATED BLOOD LOSS: < 25 ml     SPECIMENS: none     INDICATION:  Paige Dumas is a 79 y.o. who was diagnosed with meniscus tears based on history, examination, and advanced imaging. Accordingly, after a lengthy discussion of the risks and benefits and alternatives to both operative and non-operative treatment, he elected to proceed with operative intervention, and I am in agreement. Please see my clinical documentation for additional details on the history, exam, and discussion regarding surgical decision making.     PRE-PROCEDURE PROTOCOL:  The patient was identified in the  preoperative holding area where informed consent and surgical site marking were confirmed. The patient was then transferred to the operating room where anesthesia was administered by our Anesthesia colleagues in accordance with our preoperative plan. The operative extremity was prepped and draped in the usual sterile fashion. A procedural pause was performed to verify correct patient identification, procedure to be performed, laterality, agreement of all team members, availability of all equipment, and administration of preoperative antibiotics. Afterwards, the procedure commenced.     DESCRIPTION OF PROCEDURE  Examination of the knee under anesthesia was performed. This revealed 1A lachman with 0-135 degrees of motion.     Diagnostic arthroscopy was performed by inserting the camera through an inferolateral portal. An inferomedial portal was created under needle localization. 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  The posterior horn demonstrated a complex tear that was debrided with a motorized shaver to a stable margin.  Less than 15% of the posterior horn meniscus was excised.      Lateral compartment: Meniscus demonstrated a complex tear of the posterior horn that was gently debrided with arthroscopic shaver to a stable margin.  Less than 10% of the posterior horn complex tear meniscus was excised.  The cartilage in the lateral compartment was intact and within normal limits     Trochlea: Cartilage intact and within normal limits     Patella: Cartilage intact and within normal limits     There was no evidence of loose bodies or other pathology in the suprapatellar pouch, medial gutter, or lateral gutter.     The arthroscopic equipment was removed and fluid drained from the knee. The wound was closed in a standard fashion. A sterile dressing was applied. The patient was transferred to the PACU where she recovered without  complication.     POSTOPERATIVE PLAN     POSTOPERATIVE REHABILITATION: Early mobilization and WBAT    FOLLOW-UP: We will plan on seeing the patient back in approximately 1-2 weeks for wound check, and advancement of physical therapy as indicated.     A physician assistant, Josh STAHL was required during the procedure for retraction, tissue handling, dissection and suturing. There was no qualified resident available to assist      Patient Disposition:  PACU      I was present for the entire procedure.    SIGNATURE: Gael Del Rio MD  DATE: April 11, 2025  TIME: 12:32 PM

## 2025-04-24 ENCOUNTER — OFFICE VISIT (OUTPATIENT)
Dept: OBGYN CLINIC | Facility: CLINIC | Age: 80
End: 2025-04-24

## 2025-04-24 VITALS — BODY MASS INDEX: 25.44 KG/M2 | WEIGHT: 149 LBS | HEIGHT: 64 IN

## 2025-04-24 DIAGNOSIS — Z87.828 STATUS POST ARTHROSCOPIC PARTIAL MEDIAL MENISCECTOMY OF LEFT KNEE: Primary | ICD-10-CM

## 2025-04-24 DIAGNOSIS — Z98.890 STATUS POST ARTHROSCOPIC PARTIAL MEDIAL MENISCECTOMY OF LEFT KNEE: Primary | ICD-10-CM

## 2025-04-24 PROCEDURE — 99024 POSTOP FOLLOW-UP VISIT: CPT | Performed by: ORTHOPAEDIC SURGERY

## 2025-04-24 NOTE — PROGRESS NOTES
Subjective   CHIEF COMPLAINT/REASON FOR VISIT  Paige Dumas is a 79 y.o. female who presents for 2 week post op follow-up after Knee arthroscopy for medial meniscus debridement - Left on 4/10/2025     HISTORY OF PRESENT ILLNESS  Overall, she feels things are going well and progressing appropriately. Patient feels she is doing well without any  orthopedic concerns. She said she has moved away from the walker and is walking without assistance.      Objective   PHYSICAL EXAMINATION  Musculoskeletal: left Knee Examination:  General: The patient is alert, oriented, and pleasant to interact with.  Patient ambulates with Normal gait pattern  Assistive Device: No  Skin is warm and dry to touch with no signs of erythema, ecchymosis, or infection   Surgical incisions well healed  Effusion: minimal  ROM: 0° - 90°    Flexor and extensor mechanisms are intact   Neurovascular intact distally  Sensation light touch intact distally  Quadriceps atrophy     Assessment & Plan  Status post arthroscopic partial medial meniscectomy of left knee            1. Status Post Knee arthroscopy for medial meniscus debridement - Left    Patient is making appropriate progress from the date of their surgery  Educated on expected postoperative outcomes  Recommended RICE and Tylenol as needed for pain and swelling  We will plan to see her back as needed  If any issues, questions, or concerns arise between now and the next appointment, we have encouraged the patient contact our team.        Scribe Attestation      I,:  Josh Abrams PA-C am acting as a scribe while in the presence of the attending physician.:       I,:  Gael Del Rio MD personally performed the services described in this documentation    as scribed in my presence.:

## 2025-08-19 ENCOUNTER — APPOINTMENT (OUTPATIENT)
Dept: LAB | Facility: CLINIC | Age: 80
End: 2025-08-19
Payer: COMMERCIAL

## 2025-08-19 DIAGNOSIS — E78.5 HYPERLIPIDEMIA, UNSPECIFIED HYPERLIPIDEMIA TYPE: ICD-10-CM

## 2025-08-19 DIAGNOSIS — E53.8 BIOTIN-(PROPIONYL-COA-CARBOXYLASE) LIGASE DEFICIENCY: ICD-10-CM

## 2025-08-19 LAB
ALBUMIN SERPL BCG-MCNC: 4.1 G/DL (ref 3.5–5)
ALP SERPL-CCNC: 89 U/L (ref 34–104)
ALT SERPL W P-5'-P-CCNC: 10 U/L (ref 7–52)
ANION GAP SERPL CALCULATED.3IONS-SCNC: 9 MMOL/L (ref 4–13)
AST SERPL W P-5'-P-CCNC: 18 U/L (ref 13–39)
BASOPHILS # BLD AUTO: 0.05 THOUSANDS/ÂΜL (ref 0–0.1)
BASOPHILS NFR BLD AUTO: 1 % (ref 0–1)
BILIRUB SERPL-MCNC: 0.55 MG/DL (ref 0.2–1)
BUN SERPL-MCNC: 17 MG/DL (ref 5–25)
CALCIUM SERPL-MCNC: 9.3 MG/DL (ref 8.4–10.2)
CHLORIDE SERPL-SCNC: 103 MMOL/L (ref 96–108)
CHOLEST SERPL-MCNC: 130 MG/DL (ref ?–200)
CO2 SERPL-SCNC: 30 MMOL/L (ref 21–32)
CREAT SERPL-MCNC: 0.88 MG/DL (ref 0.6–1.3)
EOSINOPHIL # BLD AUTO: 0.18 THOUSAND/ÂΜL (ref 0–0.61)
EOSINOPHIL NFR BLD AUTO: 2 % (ref 0–6)
ERYTHROCYTE [DISTWIDTH] IN BLOOD BY AUTOMATED COUNT: 13.2 % (ref 11.6–15.1)
GFR SERPL CREATININE-BSD FRML MDRD: 62 ML/MIN/1.73SQ M
GLUCOSE P FAST SERPL-MCNC: 87 MG/DL (ref 65–99)
HCT VFR BLD AUTO: 35 % (ref 34.8–46.1)
HDLC SERPL-MCNC: 54 MG/DL
HGB BLD-MCNC: 11.5 G/DL (ref 11.5–15.4)
IMM GRANULOCYTES # BLD AUTO: 0.04 THOUSAND/UL (ref 0–0.2)
IMM GRANULOCYTES NFR BLD AUTO: 1 % (ref 0–2)
LDLC SERPL CALC-MCNC: 58 MG/DL (ref 0–100)
LYMPHOCYTES # BLD AUTO: 2.45 THOUSANDS/ÂΜL (ref 0.6–4.47)
LYMPHOCYTES NFR BLD AUTO: 30 % (ref 14–44)
MCH RBC QN AUTO: 29.1 PG (ref 26.8–34.3)
MCHC RBC AUTO-ENTMCNC: 32.9 G/DL (ref 31.4–37.4)
MCV RBC AUTO: 89 FL (ref 82–98)
MONOCYTES # BLD AUTO: 0.67 THOUSAND/ÂΜL (ref 0.17–1.22)
MONOCYTES NFR BLD AUTO: 8 % (ref 4–12)
NEUTROPHILS # BLD AUTO: 4.7 THOUSANDS/ÂΜL (ref 1.85–7.62)
NEUTS SEG NFR BLD AUTO: 58 % (ref 43–75)
NONHDLC SERPL-MCNC: 76 MG/DL
NRBC BLD AUTO-RTO: 0 /100 WBCS
PLATELET # BLD AUTO: 285 THOUSANDS/UL (ref 149–390)
PMV BLD AUTO: 9.9 FL (ref 8.9–12.7)
POTASSIUM SERPL-SCNC: 3.6 MMOL/L (ref 3.5–5.3)
PROT SERPL-MCNC: 6.9 G/DL (ref 6.4–8.4)
RBC # BLD AUTO: 3.95 MILLION/UL (ref 3.81–5.12)
SODIUM SERPL-SCNC: 142 MMOL/L (ref 135–147)
TRIGL SERPL-MCNC: 90 MG/DL (ref ?–150)
VIT B12 SERPL-MCNC: 257 PG/ML (ref 180–914)
WBC # BLD AUTO: 8.09 THOUSAND/UL (ref 4.31–10.16)

## 2025-08-19 PROCEDURE — 36415 COLL VENOUS BLD VENIPUNCTURE: CPT

## 2025-08-19 PROCEDURE — 80053 COMPREHEN METABOLIC PANEL: CPT

## 2025-08-19 PROCEDURE — 82607 VITAMIN B-12: CPT

## 2025-08-19 PROCEDURE — 80061 LIPID PANEL: CPT

## 2025-08-19 PROCEDURE — 85025 COMPLETE CBC W/AUTO DIFF WBC: CPT

## (undated) DEVICE — TUBING ARTHROSCOPIC WAVE  MAIN PUMP

## (undated) DEVICE — DRAPE SHEET THREE QUARTER

## (undated) DEVICE — LIGHT GLOVE GREEN

## (undated) DEVICE — BETHLEHEM UNIVERSAL  ARTHRO PK: Brand: CARDINAL HEALTH

## (undated) DEVICE — BLADE SHAVER TORPEDO 4MM 13CM  COOLCUT

## (undated) DEVICE — DISPOSABLE OR TOWEL: Brand: CARDINAL HEALTH

## (undated) DEVICE — SURGICAL GOWN, XL SMARTSLEEVE: Brand: CONVERTORS

## (undated) DEVICE — OCCLUSIVE GAUZE STRIP,3% BISMUTH TRIBROMOPHENATE IN PETROLATUM BLEND: Brand: XEROFORM

## (undated) DEVICE — PADDING CAST 6IN COTTON STRL

## (undated) DEVICE — ABDOMINAL PAD: Brand: DERMACEA

## (undated) DEVICE — IMPERVIOUS STOCKINETTE: Brand: DEROYAL

## (undated) DEVICE — ACE WRAP 6 IN UNSTERILE

## (undated) DEVICE — 3M™ STERI-DRAPE™ U-DRAPE 1015: Brand: STERI-DRAPE™

## (undated) DEVICE — SUT MONOCRYL 3-0 SH 27 IN Y416H

## (undated) DEVICE — 4-PORT MANIFOLD: Brand: NEPTUNE 2

## (undated) DEVICE — CUFF TOURNIQUET 30 X 4 IN QUICK CONNECT DISP 1BLA

## (undated) DEVICE — COBAN 6 IN STERILE

## (undated) DEVICE — WEBRIL 6 IN UNSTERILE

## (undated) DEVICE — GLOVE INDICATOR PI UNDERGLOVE SZ 8 BLUE

## (undated) DEVICE — CHLORAPREP HI-LITE 26ML ORANGE

## (undated) DEVICE — INTENDED FOR TISSUE SEPARATION, AND OTHER PROCEDURES THAT REQUIRE A SHARP SURGICAL BLADE TO PUNCTURE OR CUT.: Brand: BARD-PARKER ® CARBON RIB-BACK BLADES

## (undated) DEVICE — MAT ABSORBANT ARTHROSCOPY FLOOR 46 X 40 IN

## (undated) DEVICE — TIBURON EXTREMITY SHEET: Brand: CONVERTORS

## (undated) DEVICE — GLOVE SRG BIOGEL 7.5

## (undated) DEVICE — SCD SEQUENTIAL COMPRESSION COMFORT SLEEVE MEDIUM KNEE LENGTH: Brand: KENDALL SCD